# Patient Record
Sex: MALE | Race: WHITE | NOT HISPANIC OR LATINO | Employment: OTHER | URBAN - METROPOLITAN AREA
[De-identification: names, ages, dates, MRNs, and addresses within clinical notes are randomized per-mention and may not be internally consistent; named-entity substitution may affect disease eponyms.]

---

## 2018-11-21 ENCOUNTER — TELEPHONE (OUTPATIENT)
Dept: GASTROENTEROLOGY | Facility: CLINIC | Age: 73
End: 2018-11-21

## 2018-12-13 ENCOUNTER — OFFICE VISIT (OUTPATIENT)
Dept: GASTROENTEROLOGY | Facility: CLINIC | Age: 73
End: 2018-12-13

## 2018-12-13 VITALS
HEART RATE: 66 BPM | DIASTOLIC BLOOD PRESSURE: 98 MMHG | WEIGHT: 247 LBS | SYSTOLIC BLOOD PRESSURE: 170 MMHG | HEIGHT: 68 IN | BODY MASS INDEX: 37.44 KG/M2

## 2018-12-13 DIAGNOSIS — R13.19 ESOPHAGEAL DYSPHAGIA: Primary | ICD-10-CM

## 2018-12-13 DIAGNOSIS — Z85.038 HISTORY OF COLON CANCER: ICD-10-CM

## 2018-12-13 DIAGNOSIS — R12 HEARTBURN: ICD-10-CM

## 2018-12-13 PROCEDURE — 99204 OFFICE O/P NEW MOD 45 MIN: CPT | Performed by: INTERNAL MEDICINE

## 2018-12-13 RX ORDER — ALFUZOSIN HYDROCHLORIDE 10 MG/1
10 TABLET, EXTENDED RELEASE ORAL 2 TIMES DAILY
Status: ON HOLD | COMMUNITY
Start: 2018-09-18 | End: 2022-11-21

## 2018-12-13 RX ORDER — SITAGLIPTIN 100 MG/1
100 TABLET, FILM COATED ORAL DAILY
Status: ON HOLD | COMMUNITY
Start: 2018-12-12

## 2018-12-13 RX ORDER — SODIUM CHLORIDE, SODIUM LACTATE, POTASSIUM CHLORIDE, CALCIUM CHLORIDE 600; 310; 30; 20 MG/100ML; MG/100ML; MG/100ML; MG/100ML
30 INJECTION, SOLUTION INTRAVENOUS CONTINUOUS
Status: CANCELLED | OUTPATIENT
Start: 2018-12-18

## 2018-12-13 RX ORDER — NAPROXEN SODIUM 220 MG
220 TABLET ORAL 2 TIMES DAILY PRN
COMMUNITY
End: 2019-08-10 | Stop reason: HOSPADM

## 2018-12-13 RX ORDER — ATORVASTATIN CALCIUM 10 MG/1
10 TABLET, FILM COATED ORAL DAILY
COMMUNITY
Start: 2018-12-12 | End: 2019-07-25

## 2018-12-13 RX ORDER — ERGOCALCIFEROL (VITAMIN D2) 50 MCG
1 CAPSULE ORAL DAILY
Status: ON HOLD | COMMUNITY

## 2018-12-13 NOTE — PROGRESS NOTES
Subjective   Luis Diaz is a 73 y.o.. male is being seen for consultation today at the request of No ref. provider found    Chief Complaint   Patient presents with   • GI Problem     Rectal Drainage/Has Colostomy   • Difficulty Swallowing   • Heartburn     Reflux     History of Present Illness  Patient over the last several months has had intermittent solid food dysphagia.  This is been associated with more chronic heartburn.  He has not had any hematemesis or involuntary weight loss.  He describes the symptoms as mild-to-moderate.  He is undergone a distal colon resection for colon cancer and has drainage from his rectum and is concerned about that as well.  The following portions of the patient's history were reviewed and updated as appropriate: allergies, current medications, past family history, past medical history, past social history, past surgical history and problem list.      Current Outpatient Medications:   •  alfuzosin (UROXATRAL) 10 MG 24 hr tablet, , Disp: , Rfl:   •  atorvastatin (LIPITOR) 10 MG tablet, , Disp: , Rfl:   •  CINNAMON PO, Take  by mouth., Disp: , Rfl:   •  Coenzyme Q10 (COQ-10) 200 MG capsule, Take  by mouth., Disp: , Rfl:   •  ibuprofen (ADVIL,MOTRIN) 200 MG tablet, Take 200 mg by mouth every 6 (six) hours as needed for mild pain (1-3)., Disp: , Rfl:   •  JANUVIA 100 MG tablet, , Disp: , Rfl:   •  lisinopril (PRINIVIL,ZESTRIL) 10 MG tablet, Take 10 mg by mouth daily., Disp: , Rfl:   •  Multiple Vitamins-Minerals (CENTRUM SILVER 50+MEN PO), Take  by mouth., Disp: , Rfl:   •  naproxen sodium (ALEVE) 220 MG tablet, Take 220 mg by mouth 2 (Two) Times a Day As Needed., Disp: , Rfl:   •  Vitamin D, Ergocalciferol, 2000 units capsule, Take  by mouth., Disp: , Rfl:     Family History   Problem Relation Age of Onset   • Emphysema Mother    • Lung cancer Father        Review of Systems   Constitutional: Negative for appetite change, diaphoresis, fatigue, fever and unexpected weight change.    HENT: Positive for trouble swallowing. Negative for hearing loss, mouth sores and sore throat.    Eyes: Negative for pain and redness.   Respiratory: Negative for choking and shortness of breath.    Cardiovascular: Negative for chest pain and leg swelling.   Gastrointestinal: Negative for abdominal distention, abdominal pain, anal bleeding, blood in stool, constipation, diarrhea, nausea, rectal pain and vomiting.   Genitourinary: Negative for flank pain and hematuria.   Musculoskeletal: Negative for arthralgias and joint swelling.   Skin: Negative for color change and rash.   Allergic/Immunologic: Negative for food allergies and immunocompromised state.   Neurological: Negative for dizziness, seizures and headaches.   Hematological: Does not bruise/bleed easily.   Psychiatric/Behavioral: Negative for confusion, sleep disturbance and suicidal ideas. The patient is not nervous/anxious.        Objective   Physical Exam   Constitutional: He is oriented to person, place, and time. He appears well-developed and well-nourished.   HENT:   Head: Normocephalic and atraumatic.   Nose: Nose normal.   Eyes: Conjunctivae are normal. Pupils are equal, round, and reactive to light.   Neck: Normal range of motion. Neck supple. No thyromegaly present.   Cardiovascular: Normal heart sounds. Exam reveals no gallop and no friction rub.   No murmur heard.  Pulmonary/Chest: Effort normal and breath sounds normal.   Abdominal: Soft. Bowel sounds are normal. He exhibits no distension and no mass. There is no tenderness.   Musculoskeletal: He exhibits no edema.   Lymphadenopathy:     He has no cervical adenopathy.   Neurological: He is alert and oriented to person, place, and time.   Skin: No rash noted. No erythema.   Psychiatric: He has a normal mood and affect. His behavior is normal.   Nursing note and vitals reviewed.      Pertinent laboratory results were reviewed.  and Pertinent old records were reviewed.     Assessment/Plan    Problems Addressed this Visit        Digestive    Esophageal dysphagia - Primary    Relevant Orders    Case Request (Completed)    Heartburn    Relevant Orders    Case Request (Completed)       Other    History of colon cancer    Relevant Orders    Case Request (Completed)        EGD is scheduled to address his dysphagia.  While we are there I will take a look at his rectal stump.  I anticipate diversion proctitis.

## 2018-12-14 PROBLEM — Z85.038 HISTORY OF COLON CANCER: Status: ACTIVE | Noted: 2018-12-14

## 2018-12-18 ENCOUNTER — ANESTHESIA (OUTPATIENT)
Dept: GASTROENTEROLOGY | Facility: HOSPITAL | Age: 73
End: 2018-12-18

## 2018-12-18 ENCOUNTER — HOSPITAL ENCOUNTER (OUTPATIENT)
Facility: HOSPITAL | Age: 73
Setting detail: HOSPITAL OUTPATIENT SURGERY
Discharge: HOME | End: 2018-12-18
Attending: INTERNAL MEDICINE | Admitting: INTERNAL MEDICINE

## 2018-12-18 ENCOUNTER — ANESTHESIA EVENT (OUTPATIENT)
Dept: GASTROENTEROLOGY | Facility: HOSPITAL | Age: 73
End: 2018-12-18

## 2018-12-18 VITALS
BODY MASS INDEX: 37.29 KG/M2 | WEIGHT: 245.25 LBS | SYSTOLIC BLOOD PRESSURE: 129 MMHG | RESPIRATION RATE: 17 BRPM | OXYGEN SATURATION: 96 % | DIASTOLIC BLOOD PRESSURE: 89 MMHG | HEART RATE: 63 BPM | TEMPERATURE: 98.5 F

## 2018-12-18 DIAGNOSIS — R13.19 ESOPHAGEAL DYSPHAGIA: ICD-10-CM

## 2018-12-18 DIAGNOSIS — R12 HEARTBURN: ICD-10-CM

## 2018-12-18 DIAGNOSIS — Z85.038 HISTORY OF COLON CANCER: ICD-10-CM

## 2018-12-18 LAB — GLUCOSE BLDC GLUCOMTR-MCNC: 138 MG/DL (ref 70–130)

## 2018-12-18 PROCEDURE — 82962 GLUCOSE BLOOD TEST: CPT

## 2018-12-18 PROCEDURE — 43235 EGD DIAGNOSTIC BRUSH WASH: CPT | Performed by: INTERNAL MEDICINE

## 2018-12-18 PROCEDURE — 43450 DILATE ESOPHAGUS 1/MULT PASS: CPT | Performed by: INTERNAL MEDICINE

## 2018-12-18 PROCEDURE — 25010000002 PROPOFOL 10 MG/ML EMULSION: Performed by: ANESTHESIOLOGY

## 2018-12-18 RX ORDER — PROPOFOL 10 MG/ML
VIAL (ML) INTRAVENOUS CONTINUOUS PRN
Status: DISCONTINUED | OUTPATIENT
Start: 2018-12-18 | End: 2018-12-18 | Stop reason: SURG

## 2018-12-18 RX ORDER — PROPOFOL 10 MG/ML
VIAL (ML) INTRAVENOUS AS NEEDED
Status: DISCONTINUED | OUTPATIENT
Start: 2018-12-18 | End: 2018-12-18 | Stop reason: SURG

## 2018-12-18 RX ORDER — SODIUM CHLORIDE, SODIUM LACTATE, POTASSIUM CHLORIDE, CALCIUM CHLORIDE 600; 310; 30; 20 MG/100ML; MG/100ML; MG/100ML; MG/100ML
30 INJECTION, SOLUTION INTRAVENOUS CONTINUOUS
Status: DISCONTINUED | OUTPATIENT
Start: 2018-12-18 | End: 2018-12-18 | Stop reason: HOSPADM

## 2018-12-18 RX ORDER — LIDOCAINE HYDROCHLORIDE 20 MG/ML
INJECTION, SOLUTION INFILTRATION; PERINEURAL AS NEEDED
Status: DISCONTINUED | OUTPATIENT
Start: 2018-12-18 | End: 2018-12-18 | Stop reason: SURG

## 2018-12-18 RX ADMIN — PROPOFOL 140 MG: 10 INJECTION, EMULSION INTRAVENOUS at 10:14

## 2018-12-18 RX ADMIN — PROPOFOL 140 MCG/KG/MIN: 10 INJECTION, EMULSION INTRAVENOUS at 10:14

## 2018-12-18 RX ADMIN — LIDOCAINE HYDROCHLORIDE 40 MG: 20 INJECTION, SOLUTION INFILTRATION; PERINEURAL at 10:14

## 2018-12-18 RX ADMIN — SODIUM CHLORIDE, POTASSIUM CHLORIDE, SODIUM LACTATE AND CALCIUM CHLORIDE: 600; 310; 30; 20 INJECTION, SOLUTION INTRAVENOUS at 10:08

## 2018-12-18 RX ADMIN — SODIUM CHLORIDE, POTASSIUM CHLORIDE, SODIUM LACTATE AND CALCIUM CHLORIDE 30 ML/HR: 600; 310; 30; 20 INJECTION, SOLUTION INTRAVENOUS at 09:28

## 2018-12-18 NOTE — ANESTHESIA PREPROCEDURE EVALUATION
Anesthesia Evaluation     NPO Solid Status: > 8 hours  NPO Liquid Status: > 8 hours           Airway   Mallampati: III  Dental      Pulmonary    (+) sleep apnea on CPAP,   Cardiovascular - normal exam    (+) hypertension,       Neuro/Psych  GI/Hepatic/Renal/Endo    (+)   diabetes mellitus type 2 well controlled,   (-) PUD    Musculoskeletal     Abdominal    Substance History      OB/GYN          Other                      Anesthesia Plan    ASA 3     MAC     intravenous induction   Anesthetic plan, all risks, benefits, and alternatives have been provided, discussed and informed consent has been obtained with: patient.

## 2018-12-18 NOTE — ANESTHESIA POSTPROCEDURE EVALUATION
Patient: Luis Diaz    Procedure Summary     Date:  12/18/18 Room / Location:  [facility] ENDOSCOPY 1 /  [facility]    Anesthesia Start:  1008 Anesthesia Stop:  1033    Procedures:       ESOPHAGOGASTRODUODENOSCOPY WITH 48FR AND 52FR LITTLEJOHN DILITTATION (N/A Esophagus)      FLEXIBLE SIGMOIDOSCOPY (N/A ) Diagnosis:       Esophageal dysphagia      Heartburn      History of colon cancer      (Esophageal dysphagia [R13.10])      (Heartburn [R12])      (History of colon cancer [Z85.038])    Surgeon:  Morgan Dhaliwal MD Provider:  Jim Benjamin MD    Anesthesia Type:  MAC ASA Status:  3          Anesthesia Type: MAC  Last vitals  BP   122/70 (12/18/18 1042)   Temp   36.9 °C (98.5 °F) (12/18/18 0916)   Pulse   70 (12/18/18 1042)   Resp   16 (12/18/18 1042)     SpO2   96 % (12/18/18 1042)     Post Anesthesia Care and Evaluation    Patient location during evaluation: bedside  Patient participation: complete - patient participated  Level of consciousness: awake and alert  Pain management: adequate  Anesthetic complications: No anesthetic complications    Cardiovascular status: acceptable  Respiratory status: acceptable  Hydration status: acceptable    Comments: /70 (BP Location: Left arm, Patient Position: Sitting)   Pulse 70   Temp 36.9 °C (98.5 °F) (Oral)   Resp 16   Wt 111 kg (245 lb 4 oz)   SpO2 96%   BMI 37.29 kg/m²

## 2019-01-04 DIAGNOSIS — Z85.048 HISTORY OF RECTAL CANCER: Primary | ICD-10-CM

## 2019-01-07 ENCOUNTER — TELEPHONE (OUTPATIENT)
Dept: GASTROENTEROLOGY | Facility: CLINIC | Age: 74
End: 2019-01-07

## 2019-01-07 NOTE — TELEPHONE ENCOUNTER
Let patient know his CT scheduled for 1-9-19 at [facility].he is to arrive at 10.15am. He knows where to go. He will only have liquids that morning until after CT is done.

## 2019-01-09 ENCOUNTER — HOSPITAL ENCOUNTER (OUTPATIENT)
Dept: CT IMAGING | Facility: HOSPITAL | Age: 74
Discharge: HOME | End: 2019-01-09
Attending: INTERNAL MEDICINE | Admitting: INTERNAL MEDICINE

## 2019-01-09 DIAGNOSIS — Z85.048 HISTORY OF RECTAL CANCER: ICD-10-CM

## 2019-01-09 LAB — CREAT BLDA-MCNC: 1 MG/DL (ref 0.6–1.3)

## 2019-01-09 PROCEDURE — 25010000002 IOPAMIDOL 61 % SOLUTION: Performed by: INTERNAL MEDICINE

## 2019-01-09 PROCEDURE — 74177 CT ABD & PELVIS W/CONTRAST: CPT

## 2019-01-09 PROCEDURE — 82565 ASSAY OF CREATININE: CPT

## 2019-01-09 RX ADMIN — IOPAMIDOL 85 ML: 612 INJECTION, SOLUTION INTRAVENOUS at 10:47

## 2019-07-25 ENCOUNTER — APPOINTMENT (OUTPATIENT)
Dept: PREADMISSION TESTING | Facility: HOSPITAL | Age: 74
End: 2019-07-25

## 2019-07-25 ENCOUNTER — HOSPITAL ENCOUNTER (OUTPATIENT)
Dept: GENERAL RADIOLOGY | Facility: HOSPITAL | Age: 74
Discharge: HOME | End: 2019-07-25

## 2019-07-25 ENCOUNTER — HOSPITAL ENCOUNTER (OUTPATIENT)
Dept: GENERAL RADIOLOGY | Facility: HOSPITAL | Age: 74
Discharge: HOME | End: 2019-07-25
Admitting: ORTHOPAEDIC SURGERY

## 2019-07-25 VITALS
SYSTOLIC BLOOD PRESSURE: 151 MMHG | WEIGHT: 236.5 LBS | BODY MASS INDEX: 35.84 KG/M2 | RESPIRATION RATE: 16 BRPM | OXYGEN SATURATION: 98 % | TEMPERATURE: 97 F | HEART RATE: 64 BPM | DIASTOLIC BLOOD PRESSURE: 82 MMHG | HEIGHT: 68 IN

## 2019-07-25 DIAGNOSIS — M17.9 OSTEOARTHRITIS OF KNEE, UNSPECIFIED LATERALITY, UNSPECIFIED OSTEOARTHRITIS TYPE: ICD-10-CM

## 2019-07-25 LAB
BACTERIA UR QL AUTO: NORMAL /HPF
BILIRUB UR QL STRIP: NEGATIVE
CLARITY UR: CLEAR
COLOR UR: YELLOW
GLUCOSE UR STRIP-MCNC: NEGATIVE MG/DL
HGB UR QL STRIP.AUTO: NEGATIVE
HYALINE CASTS UR QL AUTO: NORMAL /LPF
INR PPP: 1 (ref 0.9–1.1)
KETONES UR QL STRIP: NEGATIVE
LEUKOCYTE ESTERASE UR QL STRIP.AUTO: NEGATIVE
NITRITE UR QL STRIP: NEGATIVE
PH UR STRIP.AUTO: 5.5 [PH] (ref 5–8)
PROT UR QL STRIP: NEGATIVE
PROTHROMBIN TIME: 12.9 SECONDS (ref 11.7–14.2)
RBC # UR: NORMAL /HPF
REF LAB TEST METHOD: NORMAL
SP GR UR STRIP: 1.02 (ref 1–1.03)
SQUAMOUS #/AREA URNS HPF: NORMAL /HPF
UROBILINOGEN UR QL STRIP: NORMAL
WBC UR QL AUTO: NORMAL /HPF

## 2019-07-25 PROCEDURE — 81001 URINALYSIS AUTO W/SCOPE: CPT | Performed by: ORTHOPAEDIC SURGERY

## 2019-07-25 PROCEDURE — 85610 PROTHROMBIN TIME: CPT | Performed by: ORTHOPAEDIC SURGERY

## 2019-07-25 PROCEDURE — 36415 COLL VENOUS BLD VENIPUNCTURE: CPT

## 2019-07-25 PROCEDURE — 93005 ELECTROCARDIOGRAM TRACING: CPT

## 2019-07-25 PROCEDURE — 93010 ELECTROCARDIOGRAM REPORT: CPT | Performed by: INTERNAL MEDICINE

## 2019-07-25 PROCEDURE — 71046 X-RAY EXAM CHEST 2 VIEWS: CPT

## 2019-07-25 PROCEDURE — 73560 X-RAY EXAM OF KNEE 1 OR 2: CPT

## 2019-07-25 ASSESSMENT — KOOS JR
KOOS JR SCORE: 24
KOOS JR SCORE: 24.875

## 2019-07-25 NOTE — DISCHARGE INSTRUCTIONS
Take the following medications the morning of surgery with a small sip of water:  BRING LISINOPRIL WITH YOU MORNING OF SURGERY      General Instructions: CLEAR LIQUIDS UNTIL 6:30 AM MORNING OF SURGERY  • Do not eat solid food after midnight the night before surgery.  • You may drink clear liquids day of surgery but must stop at least one hour before your hospital arrival time.  • It is beneficial for you to have a clear drink that contains carbohydrates the day of surgery.  We suggest a 12 to 20 ounce bottle of Gatorade or Powerade for non-diabetic patients or a 12 to 20 ounce bottle of G2 or Powerade Zero for diabetic patients. (Pediatric patients, are not advised to drink a 12 to 20 ounce carbohydrate drink)    Clear liquids are liquids you can see through.  Nothing red in color.     Plain water                               Sports drinks  Sodas                                   Gelatin (Jell-O)  Fruit juices without pulp such as white grape juice and apple juice  Popsicles that contain no fruit or yogurt  Tea or coffee (no cream or milk added)  Gatorade / Powerade  G2 / Powerade Zero    • Infants may have breast milk up to four hours before surgery.  • Infants drinking formula may drink formula up to six hours before surgery.   • Patients who avoid smoking, chewing tobacco and alcohol for 4 weeks prior to surgery have a reduced risk of post-operative complications.  Quit smoking as many days before surgery as you can.  • Do not smoke, use chewing tobacco or drink alcohol the day of surgery.   • If applicable bring your C-PAP/ BI-PAP machine.  • Bring any papers given to you in the doctor’s office.  • Wear clean comfortable clothes and socks.  • Do not wear contact lenses, false eyelashes or make-up.  Bring a case for your glasses.   • Bring crutches or walker if applicable.  • Remove all piercings.  Leave jewelry and any other valuables at home.  • Hair extensions with metal clips must be removed prior to  surgery.  • The Pre-Admission Testing nurse will instruct you to bring medications if unable to obtain an accurate list in Pre-Admission Testing.        If you were given a blood bank ID arm band remember to bring it with you the day of surgery.    Preventing a Surgical Site Infection:  • For 2 to 3 days before surgery, avoid shaving with a razor because the razor can irritate skin and make it easier to develop an infection.    • Any areas of open skin can increase the risk of a post-operative wound infection by allowing bacteria to enter and travel throughout the body.  Notify your surgeon if you have any skin wounds / rashes even if it is not near the expected surgical site.  The area will need assessed to determine if surgery should be delayed until it is healed.  • The night prior to surgery sleep in a clean bed with clean clothing.  Do not allow pets to sleep with you.  • Shower on the morning of surgery using a fresh bar of anti-bacterial soap (such as Dial) and clean washcloth.  Dry with a clean towel and dress in clean clothing.  • Ask your surgeon if you will be receiving antibiotics prior to surgery.  • Make sure you, your family, and all healthcare providers clean their hands with soap and water or an alcohol based hand  before caring for you or your wound.    Day of surgery: 8/8/2019 ARRIVAL TIME 7:30 AM  Upon arrival, a Pre-op nurse and Anesthesiologist will review your health history, obtain vital signs, and answer questions you may have.  The only belongings needed at this time will be a list of your home medications and if applicable your C-PAP/BI-PAP machine.  If you are staying overnight your family can leave the rest of your belongings in the car and bring them to your room later.  A Pre-op nurse will start an IV and you may receive medication in preparation for surgery, including something to help you relax.  Your family will be able to see you in the Pre-op area.  While you are in  surgery your family should notify the waiting room  if they leave the waiting room area and provide a contact phone number.    Please be aware that surgery does come with discomfort.  We want to make every effort to control your discomfort so please discuss any uncontrolled symptoms with your nurse.   Your doctor will most likely have prescribed pain medications.      If you are going home after surgery you will receive individualized written care instructions before being discharged.  A responsible adult must drive you to and from the hospital on the day of your surgery and stay with you for 24 hours.    If you are staying overnight following surgery, you will be transported to your hospital room following the recovery period.  [facility] has all private rooms.    You have received a list of surgical assistants for your reference.  If you have any questions please call Pre-Admission Testing at [phone].  Deductibles and co-payments are collected on the day of service. Please be prepared to pay the required co-pay, deductible or deposit on the day of service as defined by your plan.    2% CHLORAHEXIDINE GLUCONATE* CLOTH  Preparing or “prepping” skin before surgery can reduce the risk of infection at the surgical site. To make the process easier, [facility] has chosen disposable cloths moistened with a rinse-free, 2% Chlorhexidine Gluconate (CHG) antiseptic solution. The steps below outline the prepping process and should be carefully followed.        Use the prep cloth on the area that is circled in the diagram             Directions Night before Surgery  1) Shower using a fresh bar of anti-bacterial soap (such as Dial) and clean washcloth.  Use a clean towel to completely dry your skin.  2) Do not use any lotions, oils or creams on your skin.  3) Open the package and remove 1 cloth, wipe your skin for 30 seconds in a circular motion.  Allow to dry for 3 minutes.  4) Repeat  #3 with second cloth.  5) Do not touch your eyes, ears, or mouth with the prep cloth.  6) Allow the wet prep solution to air dry.  7) Discard the prep cloth and wash your hands with soap and water.   8) Dress in clean bed clothes and sleep on fresh clean bed sheets.   9) You may experience some temporary itching after the prep.    Directions Day of Surgery  1) Repeat steps 1,2,3,4,5,6,7, and 9.   2) Dress in clean clothes before coming to the hospital.    BACTROBAN NASAL OINTMENT  There are many germs normally in your nose. Bactroban is an ointment that will help reduce these germs. Please follow these instructions for Bactroban use:      ____The day before surgery in the morning  Date________    ____The day before surgery in the evening              Date________    ____The day of surgery in the morning    Date________    **Squirt ½ package of Bactroban Ointment onto a cotton applicator and apply to inside of 1st nostril.  Squirt the remaining Bactroban and apply to the inside of the other nostril.

## 2019-08-08 ENCOUNTER — HOSPITAL ENCOUNTER (INPATIENT)
Facility: HOSPITAL | Age: 74
LOS: 2 days | Discharge: HOME HEALTH | End: 2019-08-10
Attending: ORTHOPAEDIC SURGERY | Admitting: ORTHOPAEDIC SURGERY

## 2019-08-08 ENCOUNTER — ANESTHESIA (OUTPATIENT)
Dept: PERIOP | Facility: HOSPITAL | Age: 74
End: 2019-08-08

## 2019-08-08 ENCOUNTER — ANESTHESIA EVENT (OUTPATIENT)
Dept: PERIOP | Facility: HOSPITAL | Age: 74
End: 2019-08-08

## 2019-08-08 ENCOUNTER — APPOINTMENT (OUTPATIENT)
Dept: GENERAL RADIOLOGY | Facility: HOSPITAL | Age: 74
End: 2019-08-08

## 2019-08-08 PROBLEM — M17.9 OA (OSTEOARTHRITIS) OF KNEE: Status: ACTIVE | Noted: 2019-08-08

## 2019-08-08 LAB
GLUCOSE BLDC GLUCOMTR-MCNC: 157 MG/DL (ref 70–130)
GLUCOSE BLDC GLUCOMTR-MCNC: 174 MG/DL (ref 70–130)
GLUCOSE BLDC GLUCOMTR-MCNC: 175 MG/DL (ref 70–130)

## 2019-08-08 PROCEDURE — 25010000002 PROPOFOL 10 MG/ML EMULSION: Performed by: NURSE ANESTHETIST, CERTIFIED REGISTERED

## 2019-08-08 PROCEDURE — 25010000002 MORPHINE (PF) 10 MG/ML SOLUTION 1 ML VIAL: Performed by: ORTHOPAEDIC SURGERY

## 2019-08-08 PROCEDURE — 25010000002 NEOSTIGMINE PER 0.5 MG: Performed by: NURSE ANESTHETIST, CERTIFIED REGISTERED

## 2019-08-08 PROCEDURE — 25010000002 HYDROMORPHONE PER 4 MG: Performed by: NURSE ANESTHETIST, CERTIFIED REGISTERED

## 2019-08-08 PROCEDURE — 97110 THERAPEUTIC EXERCISES: CPT

## 2019-08-08 PROCEDURE — 82962 GLUCOSE BLOOD TEST: CPT

## 2019-08-08 PROCEDURE — 25010000002 ROPIVACAINE PER 1 MG: Performed by: STUDENT IN AN ORGANIZED HEALTH CARE EDUCATION/TRAINING PROGRAM

## 2019-08-08 PROCEDURE — 25010000002 ONDANSETRON PER 1 MG: Performed by: NURSE ANESTHETIST, CERTIFIED REGISTERED

## 2019-08-08 PROCEDURE — 73560 X-RAY EXAM OF KNEE 1 OR 2: CPT

## 2019-08-08 PROCEDURE — 25010000002 FENTANYL CITRATE (PF) 100 MCG/2ML SOLUTION: Performed by: NURSE ANESTHETIST, CERTIFIED REGISTERED

## 2019-08-08 PROCEDURE — C1776 JOINT DEVICE (IMPLANTABLE): HCPCS | Performed by: ORTHOPAEDIC SURGERY

## 2019-08-08 PROCEDURE — 25010000002 MIDAZOLAM PER 1 MG: Performed by: ANESTHESIOLOGY

## 2019-08-08 PROCEDURE — C1713 ANCHOR/SCREW BN/BN,TIS/BN: HCPCS | Performed by: ORTHOPAEDIC SURGERY

## 2019-08-08 PROCEDURE — 25010000003 CEFAZOLIN IN DEXTROSE 2-4 GM/100ML-% SOLUTION: Performed by: ORTHOPAEDIC SURGERY

## 2019-08-08 PROCEDURE — 97162 PT EVAL MOD COMPLEX 30 MIN: CPT

## 2019-08-08 PROCEDURE — 0SRC0J9 REPLACEMENT OF RIGHT KNEE JOINT WITH SYNTHETIC SUBSTITUTE, CEMENTED, OPEN APPROACH: ICD-10-PCS | Performed by: ORTHOPAEDIC SURGERY

## 2019-08-08 PROCEDURE — 25010000002 ROPIVACAINE PER 1 MG: Performed by: ORTHOPAEDIC SURGERY

## 2019-08-08 DEVICE — CAP TOTL KN CMT PREMIUM: Type: IMPLANTABLE DEVICE | Site: KNEE | Status: FUNCTIONAL

## 2019-08-08 DEVICE — CMT BONE R 1X40: Type: IMPLANTABLE DEVICE | Site: KNEE | Status: FUNCTIONAL

## 2019-08-08 DEVICE — IMPLANTABLE DEVICE
Type: IMPLANTABLE DEVICE | Site: KNEE | Status: FUNCTIONAL
Brand: VANGUARD® KNEE SYSTEM

## 2019-08-08 DEVICE — IMPLANTABLE DEVICE
Type: IMPLANTABLE DEVICE | Site: KNEE | Status: FUNCTIONAL
Brand: BIOMET KNEE SYSTEM

## 2019-08-08 DEVICE — IMPLANTABLE DEVICE
Type: IMPLANTABLE DEVICE | Site: KNEE | Status: FUNCTIONAL
Brand: BIOMET® KNEE SYSTEM

## 2019-08-08 DEVICE — IMPLANTABLE DEVICE
Type: IMPLANTABLE DEVICE | Site: KNEE | Status: FUNCTIONAL
Brand: VANGUARD KNEE SYSTEM

## 2019-08-08 RX ORDER — CHLORHEXIDINE GLUCONATE 500 MG/1
1 CLOTH TOPICAL TAKE AS DIRECTED
COMMUNITY
End: 2019-08-10 | Stop reason: HOSPADM

## 2019-08-08 RX ORDER — ROPIVACAINE HYDROCHLORIDE 5 MG/ML
INJECTION, SOLUTION EPIDURAL; INFILTRATION; PERINEURAL
Status: COMPLETED | OUTPATIENT
Start: 2019-08-08 | End: 2019-08-08

## 2019-08-08 RX ORDER — OXYCODONE AND ACETAMINOPHEN 7.5; 325 MG/1; MG/1
1 TABLET ORAL ONCE AS NEEDED
Status: DISCONTINUED | OUTPATIENT
Start: 2019-08-08 | End: 2019-08-08 | Stop reason: HOSPADM

## 2019-08-08 RX ORDER — ACETAMINOPHEN 500 MG
1000 TABLET ORAL
Status: COMPLETED | OUTPATIENT
Start: 2019-08-08 | End: 2019-08-08

## 2019-08-08 RX ORDER — CEFAZOLIN SODIUM 2 G/100ML
2 INJECTION, SOLUTION INTRAVENOUS
Status: COMPLETED | OUTPATIENT
Start: 2019-08-08 | End: 2019-08-08

## 2019-08-08 RX ORDER — NALOXONE HCL 0.4 MG/ML
0.2 VIAL (ML) INJECTION AS NEEDED
Status: DISCONTINUED | OUTPATIENT
Start: 2019-08-08 | End: 2019-08-08 | Stop reason: HOSPADM

## 2019-08-08 RX ORDER — SODIUM CHLORIDE 0.9 % (FLUSH) 0.9 %
3 SYRINGE (ML) INJECTION EVERY 12 HOURS SCHEDULED
Status: DISCONTINUED | OUTPATIENT
Start: 2019-08-08 | End: 2019-08-08 | Stop reason: HOSPADM

## 2019-08-08 RX ORDER — MIDAZOLAM HYDROCHLORIDE 1 MG/ML
1 INJECTION INTRAMUSCULAR; INTRAVENOUS
Status: DISCONTINUED | OUTPATIENT
Start: 2019-08-08 | End: 2019-08-08 | Stop reason: HOSPADM

## 2019-08-08 RX ORDER — CHOLECALCIFEROL (VITAMIN D3) 125 MCG
5 CAPSULE ORAL NIGHTLY PRN
Status: DISCONTINUED | OUTPATIENT
Start: 2019-08-08 | End: 2019-08-10 | Stop reason: HOSPADM

## 2019-08-08 RX ORDER — FENTANYL CITRATE 50 UG/ML
50 INJECTION, SOLUTION INTRAMUSCULAR; INTRAVENOUS
Status: DISCONTINUED | OUTPATIENT
Start: 2019-08-08 | End: 2019-08-08 | Stop reason: HOSPADM

## 2019-08-08 RX ORDER — LABETALOL HYDROCHLORIDE 5 MG/ML
5 INJECTION, SOLUTION INTRAVENOUS
Status: DISCONTINUED | OUTPATIENT
Start: 2019-08-08 | End: 2019-08-08 | Stop reason: HOSPADM

## 2019-08-08 RX ORDER — FLUMAZENIL 0.1 MG/ML
0.2 INJECTION INTRAVENOUS AS NEEDED
Status: DISCONTINUED | OUTPATIENT
Start: 2019-08-08 | End: 2019-08-08 | Stop reason: HOSPADM

## 2019-08-08 RX ORDER — BISACODYL 10 MG
10 SUPPOSITORY, RECTAL RECTAL DAILY PRN
Status: DISCONTINUED | OUTPATIENT
Start: 2019-08-08 | End: 2019-08-10 | Stop reason: HOSPADM

## 2019-08-08 RX ORDER — FENTANYL CITRATE 50 UG/ML
INJECTION, SOLUTION INTRAMUSCULAR; INTRAVENOUS AS NEEDED
Status: DISCONTINUED | OUTPATIENT
Start: 2019-08-08 | End: 2019-08-08 | Stop reason: SURG

## 2019-08-08 RX ORDER — HYDROCODONE BITARTRATE AND ACETAMINOPHEN 7.5; 325 MG/1; MG/1
1 TABLET ORAL ONCE AS NEEDED
Status: DISCONTINUED | OUTPATIENT
Start: 2019-08-08 | End: 2019-08-08 | Stop reason: HOSPADM

## 2019-08-08 RX ORDER — PROMETHAZINE HYDROCHLORIDE 25 MG/1
25 SUPPOSITORY RECTAL ONCE AS NEEDED
Status: DISCONTINUED | OUTPATIENT
Start: 2019-08-08 | End: 2019-08-08 | Stop reason: HOSPADM

## 2019-08-08 RX ORDER — ATORVASTATIN CALCIUM 10 MG/1
10 TABLET, FILM COATED ORAL NIGHTLY
Status: DISCONTINUED | OUTPATIENT
Start: 2019-08-08 | End: 2019-08-10 | Stop reason: HOSPADM

## 2019-08-08 RX ORDER — SODIUM CHLORIDE 0.9 % (FLUSH) 0.9 %
3-10 SYRINGE (ML) INJECTION AS NEEDED
Status: DISCONTINUED | OUTPATIENT
Start: 2019-08-08 | End: 2019-08-08 | Stop reason: HOSPADM

## 2019-08-08 RX ORDER — DOCUSATE SODIUM 100 MG/1
100 CAPSULE, LIQUID FILLED ORAL 2 TIMES DAILY PRN
Status: DISCONTINUED | OUTPATIENT
Start: 2019-08-08 | End: 2019-08-10 | Stop reason: HOSPADM

## 2019-08-08 RX ORDER — SENNA AND DOCUSATE SODIUM 50; 8.6 MG/1; MG/1
2 TABLET, FILM COATED ORAL 2 TIMES DAILY PRN
Status: DISCONTINUED | OUTPATIENT
Start: 2019-08-08 | End: 2019-08-10 | Stop reason: HOSPADM

## 2019-08-08 RX ORDER — HYDROMORPHONE HYDROCHLORIDE 1 MG/ML
0.5 INJECTION, SOLUTION INTRAMUSCULAR; INTRAVENOUS; SUBCUTANEOUS
Status: DISCONTINUED | OUTPATIENT
Start: 2019-08-08 | End: 2019-08-08 | Stop reason: HOSPADM

## 2019-08-08 RX ORDER — ACETAMINOPHEN 325 MG/1
325 TABLET ORAL EVERY 4 HOURS PRN
Status: DISCONTINUED | OUTPATIENT
Start: 2019-08-08 | End: 2019-08-10 | Stop reason: HOSPADM

## 2019-08-08 RX ORDER — ROCURONIUM BROMIDE 10 MG/ML
INJECTION, SOLUTION INTRAVENOUS AS NEEDED
Status: DISCONTINUED | OUTPATIENT
Start: 2019-08-08 | End: 2019-08-08 | Stop reason: SURG

## 2019-08-08 RX ORDER — TRANEXAMIC ACID 100 MG/ML
INJECTION, SOLUTION INTRAVENOUS AS NEEDED
Status: DISCONTINUED | OUTPATIENT
Start: 2019-08-08 | End: 2019-08-08 | Stop reason: SURG

## 2019-08-08 RX ORDER — LISINOPRIL 10 MG/1
10 TABLET ORAL DAILY
Status: DISCONTINUED | OUTPATIENT
Start: 2019-08-08 | End: 2019-08-10 | Stop reason: HOSPADM

## 2019-08-08 RX ORDER — HYDROMORPHONE HCL 110MG/55ML
PATIENT CONTROLLED ANALGESIA SYRINGE INTRAVENOUS AS NEEDED
Status: DISCONTINUED | OUTPATIENT
Start: 2019-08-08 | End: 2019-08-08 | Stop reason: SURG

## 2019-08-08 RX ORDER — LIDOCAINE HYDROCHLORIDE 20 MG/ML
INJECTION, SOLUTION INFILTRATION; PERINEURAL AS NEEDED
Status: DISCONTINUED | OUTPATIENT
Start: 2019-08-08 | End: 2019-08-08 | Stop reason: SURG

## 2019-08-08 RX ORDER — ATORVASTATIN CALCIUM 10 MG/1
10 TABLET, FILM COATED ORAL NIGHTLY
Status: ON HOLD | COMMUNITY

## 2019-08-08 RX ORDER — ACETAMINOPHEN 325 MG/1
650 TABLET ORAL ONCE AS NEEDED
Status: DISCONTINUED | OUTPATIENT
Start: 2019-08-08 | End: 2019-08-08 | Stop reason: HOSPADM

## 2019-08-08 RX ORDER — CLINDAMYCIN PHOSPHATE 900 MG/50ML
900 INJECTION INTRAVENOUS EVERY 8 HOURS
Status: COMPLETED | OUTPATIENT
Start: 2019-08-08 | End: 2019-08-09

## 2019-08-08 RX ORDER — OXYCODONE HYDROCHLORIDE AND ACETAMINOPHEN 5; 325 MG/1; MG/1
2 TABLET ORAL EVERY 4 HOURS PRN
Status: DISCONTINUED | OUTPATIENT
Start: 2019-08-08 | End: 2019-08-10 | Stop reason: HOSPADM

## 2019-08-08 RX ORDER — FERROUS SULFATE 325(65) MG
325 TABLET ORAL
Status: DISCONTINUED | OUTPATIENT
Start: 2019-08-09 | End: 2019-08-10 | Stop reason: HOSPADM

## 2019-08-08 RX ORDER — FAMOTIDINE 10 MG/ML
20 INJECTION, SOLUTION INTRAVENOUS ONCE
Status: COMPLETED | OUTPATIENT
Start: 2019-08-08 | End: 2019-08-08

## 2019-08-08 RX ORDER — SODIUM CHLORIDE, SODIUM LACTATE, POTASSIUM CHLORIDE, CALCIUM CHLORIDE 600; 310; 30; 20 MG/100ML; MG/100ML; MG/100ML; MG/100ML
9 INJECTION, SOLUTION INTRAVENOUS CONTINUOUS
Status: DISCONTINUED | OUTPATIENT
Start: 2019-08-08 | End: 2019-08-10 | Stop reason: HOSPADM

## 2019-08-08 RX ORDER — OXYCODONE HYDROCHLORIDE AND ACETAMINOPHEN 5; 325 MG/1; MG/1
1 TABLET ORAL EVERY 4 HOURS PRN
Status: DISCONTINUED | OUTPATIENT
Start: 2019-08-08 | End: 2019-08-10 | Stop reason: HOSPADM

## 2019-08-08 RX ORDER — PROMETHAZINE HYDROCHLORIDE 25 MG/ML
12.5 INJECTION, SOLUTION INTRAMUSCULAR; INTRAVENOUS ONCE AS NEEDED
Status: DISCONTINUED | OUTPATIENT
Start: 2019-08-08 | End: 2019-08-08 | Stop reason: HOSPADM

## 2019-08-08 RX ORDER — PROMETHAZINE HYDROCHLORIDE 25 MG/1
25 TABLET ORAL ONCE AS NEEDED
Status: DISCONTINUED | OUTPATIENT
Start: 2019-08-08 | End: 2019-08-08 | Stop reason: HOSPADM

## 2019-08-08 RX ORDER — TAMSULOSIN HYDROCHLORIDE 0.4 MG/1
0.4 CAPSULE ORAL NIGHTLY
Status: DISCONTINUED | OUTPATIENT
Start: 2019-08-08 | End: 2019-08-10 | Stop reason: HOSPADM

## 2019-08-08 RX ORDER — DIAZEPAM 5 MG/1
5 TABLET ORAL EVERY 6 HOURS PRN
Status: DISCONTINUED | OUTPATIENT
Start: 2019-08-08 | End: 2019-08-10 | Stop reason: HOSPADM

## 2019-08-08 RX ORDER — ASPIRIN 81 MG/1
81 TABLET ORAL 2 TIMES DAILY WITH MEALS
Status: DISCONTINUED | OUTPATIENT
Start: 2019-08-08 | End: 2019-08-10 | Stop reason: HOSPADM

## 2019-08-08 RX ORDER — PROMETHAZINE HYDROCHLORIDE 25 MG/ML
6.25 INJECTION, SOLUTION INTRAMUSCULAR; INTRAVENOUS
Status: DISCONTINUED | OUTPATIENT
Start: 2019-08-08 | End: 2019-08-08 | Stop reason: HOSPADM

## 2019-08-08 RX ORDER — PROPOFOL 10 MG/ML
VIAL (ML) INTRAVENOUS AS NEEDED
Status: DISCONTINUED | OUTPATIENT
Start: 2019-08-08 | End: 2019-08-08 | Stop reason: SURG

## 2019-08-08 RX ORDER — HYDRALAZINE HYDROCHLORIDE 20 MG/ML
5 INJECTION INTRAMUSCULAR; INTRAVENOUS
Status: DISCONTINUED | OUTPATIENT
Start: 2019-08-08 | End: 2019-08-08 | Stop reason: HOSPADM

## 2019-08-08 RX ORDER — MORPHINE SULFATE 2 MG/ML
6 INJECTION, SOLUTION INTRAMUSCULAR; INTRAVENOUS
Status: DISCONTINUED | OUTPATIENT
Start: 2019-08-08 | End: 2019-08-10 | Stop reason: HOSPADM

## 2019-08-08 RX ORDER — KETOROLAC TROMETHAMINE 15 MG/ML
15 INJECTION, SOLUTION INTRAMUSCULAR; INTRAVENOUS EVERY 8 HOURS PRN
Status: DISCONTINUED | OUTPATIENT
Start: 2019-08-08 | End: 2019-08-08

## 2019-08-08 RX ORDER — DIPHENHYDRAMINE HCL 25 MG
25 CAPSULE ORAL
Status: DISCONTINUED | OUTPATIENT
Start: 2019-08-08 | End: 2019-08-08 | Stop reason: HOSPADM

## 2019-08-08 RX ORDER — SODIUM CHLORIDE 450 MG/100ML
100 INJECTION, SOLUTION INTRAVENOUS CONTINUOUS
Status: DISCONTINUED | OUTPATIENT
Start: 2019-08-08 | End: 2019-08-10 | Stop reason: HOSPADM

## 2019-08-08 RX ORDER — EPHEDRINE SULFATE 50 MG/ML
5 INJECTION, SOLUTION INTRAVENOUS ONCE AS NEEDED
Status: DISCONTINUED | OUTPATIENT
Start: 2019-08-08 | End: 2019-08-08 | Stop reason: HOSPADM

## 2019-08-08 RX ORDER — ONDANSETRON 2 MG/ML
4 INJECTION INTRAMUSCULAR; INTRAVENOUS ONCE AS NEEDED
Status: DISCONTINUED | OUTPATIENT
Start: 2019-08-08 | End: 2019-08-08 | Stop reason: HOSPADM

## 2019-08-08 RX ORDER — DIPHENHYDRAMINE HYDROCHLORIDE 50 MG/ML
12.5 INJECTION INTRAMUSCULAR; INTRAVENOUS
Status: DISCONTINUED | OUTPATIENT
Start: 2019-08-08 | End: 2019-08-08 | Stop reason: HOSPADM

## 2019-08-08 RX ORDER — MIDAZOLAM HYDROCHLORIDE 1 MG/ML
2 INJECTION INTRAMUSCULAR; INTRAVENOUS
Status: DISCONTINUED | OUTPATIENT
Start: 2019-08-08 | End: 2019-08-08 | Stop reason: HOSPADM

## 2019-08-08 RX ORDER — SODIUM CHLORIDE 0.9 % (FLUSH) 0.9 %
1-10 SYRINGE (ML) INJECTION AS NEEDED
Status: DISCONTINUED | OUTPATIENT
Start: 2019-08-08 | End: 2019-08-10 | Stop reason: HOSPADM

## 2019-08-08 RX ORDER — SODIUM CHLORIDE 0.9 % (FLUSH) 0.9 %
3 SYRINGE (ML) INJECTION EVERY 12 HOURS SCHEDULED
Status: DISCONTINUED | OUTPATIENT
Start: 2019-08-08 | End: 2019-08-10 | Stop reason: HOSPADM

## 2019-08-08 RX ORDER — GLYCOPYRROLATE 0.2 MG/ML
INJECTION INTRAMUSCULAR; INTRAVENOUS AS NEEDED
Status: DISCONTINUED | OUTPATIENT
Start: 2019-08-08 | End: 2019-08-08 | Stop reason: SURG

## 2019-08-08 RX ORDER — ONDANSETRON 2 MG/ML
INJECTION INTRAMUSCULAR; INTRAVENOUS AS NEEDED
Status: DISCONTINUED | OUTPATIENT
Start: 2019-08-08 | End: 2019-08-08 | Stop reason: SURG

## 2019-08-08 RX ORDER — NALOXONE HCL 0.4 MG/ML
0.4 VIAL (ML) INJECTION
Status: DISCONTINUED | OUTPATIENT
Start: 2019-08-08 | End: 2019-08-10 | Stop reason: HOSPADM

## 2019-08-08 RX ORDER — MAGNESIUM HYDROXIDE 1200 MG/15ML
LIQUID ORAL AS NEEDED
Status: DISCONTINUED | OUTPATIENT
Start: 2019-08-08 | End: 2019-08-08 | Stop reason: HOSPADM

## 2019-08-08 RX ADMIN — CLINDAMYCIN PHOSPHATE 900 MG: 900 INJECTION, SOLUTION INTRAVENOUS at 17:43

## 2019-08-08 RX ADMIN — SODIUM CHLORIDE, POTASSIUM CHLORIDE, SODIUM LACTATE AND CALCIUM CHLORIDE: 600; 310; 30; 20 INJECTION, SOLUTION INTRAVENOUS at 12:39

## 2019-08-08 RX ADMIN — CEFAZOLIN SODIUM 2 G: 2 INJECTION, SOLUTION INTRAVENOUS at 10:54

## 2019-08-08 RX ADMIN — HYDROMORPHONE HYDROCHLORIDE 0.5 MG: 2 INJECTION INTRAMUSCULAR; INTRAVENOUS; SUBCUTANEOUS at 12:32

## 2019-08-08 RX ADMIN — HYDROMORPHONE HYDROCHLORIDE 1 MG: 2 INJECTION INTRAMUSCULAR; INTRAVENOUS; SUBCUTANEOUS at 12:48

## 2019-08-08 RX ADMIN — ONDANSETRON 4 MG: 2 INJECTION INTRAMUSCULAR; INTRAVENOUS at 12:24

## 2019-08-08 RX ADMIN — OXYCODONE HYDROCHLORIDE AND ACETAMINOPHEN 1 TABLET: 5; 325 TABLET ORAL at 17:43

## 2019-08-08 RX ADMIN — LABETALOL 20 MG/4 ML (5 MG/ML) INTRAVENOUS SYRINGE 5 MG: at 13:28

## 2019-08-08 RX ADMIN — ASPIRIN 81 MG: 81 TABLET, COATED ORAL at 17:44

## 2019-08-08 RX ADMIN — HYDROMORPHONE HYDROCHLORIDE 0.5 MG: 1 INJECTION, SOLUTION INTRAMUSCULAR; INTRAVENOUS; SUBCUTANEOUS at 13:23

## 2019-08-08 RX ADMIN — HYDROMORPHONE HYDROCHLORIDE 0.5 MG: 1 INJECTION, SOLUTION INTRAMUSCULAR; INTRAVENOUS; SUBCUTANEOUS at 13:05

## 2019-08-08 RX ADMIN — FENTANYL CITRATE 150 MCG: 50 INJECTION, SOLUTION INTRAMUSCULAR; INTRAVENOUS at 10:47

## 2019-08-08 RX ADMIN — TRANEXAMIC ACID 1000 MG: 100 INJECTION, SOLUTION INTRAVENOUS at 11:02

## 2019-08-08 RX ADMIN — TAMSULOSIN HYDROCHLORIDE 0.4 MG: 0.4 CAPSULE ORAL at 21:44

## 2019-08-08 RX ADMIN — SODIUM CHLORIDE, POTASSIUM CHLORIDE, SODIUM LACTATE AND CALCIUM CHLORIDE 9 ML/HR: 600; 310; 30; 20 INJECTION, SOLUTION INTRAVENOUS at 08:40

## 2019-08-08 RX ADMIN — GLYCOPYRROLATE 0.4 MG: 0.2 INJECTION INTRAMUSCULAR; INTRAVENOUS at 12:26

## 2019-08-08 RX ADMIN — ACETAMINOPHEN 1000 MG: 500 TABLET, FILM COATED ORAL at 08:40

## 2019-08-08 RX ADMIN — LISINOPRIL 10 MG: 10 TABLET ORAL at 17:44

## 2019-08-08 RX ADMIN — PROPOFOL 200 MG: 10 INJECTION, EMULSION INTRAVENOUS at 10:47

## 2019-08-08 RX ADMIN — FENTANYL CITRATE 50 MCG: 50 INJECTION, SOLUTION INTRAMUSCULAR; INTRAVENOUS at 13:00

## 2019-08-08 RX ADMIN — LINAGLIPTIN 5 MG: 5 TABLET, FILM COATED ORAL at 17:44

## 2019-08-08 RX ADMIN — MUPIROCIN 1 APPLICATION: 20 OINTMENT TOPICAL at 21:44

## 2019-08-08 RX ADMIN — FENTANYL CITRATE 50 MCG: 50 INJECTION, SOLUTION INTRAMUSCULAR; INTRAVENOUS at 11:29

## 2019-08-08 RX ADMIN — SODIUM CHLORIDE, PRESERVATIVE FREE 3 ML: 5 INJECTION INTRAVENOUS at 21:45

## 2019-08-08 RX ADMIN — ATORVASTATIN CALCIUM 10 MG: 10 TABLET, FILM COATED ORAL at 21:44

## 2019-08-08 RX ADMIN — OXYCODONE HYDROCHLORIDE AND ACETAMINOPHEN 2 TABLET: 5; 325 TABLET ORAL at 21:44

## 2019-08-08 RX ADMIN — HYDROMORPHONE HYDROCHLORIDE 0.5 MG: 2 INJECTION INTRAMUSCULAR; INTRAVENOUS; SUBCUTANEOUS at 12:42

## 2019-08-08 RX ADMIN — CEFAZOLIN SODIUM 2 G: 2 INJECTION, SOLUTION INTRAVENOUS at 12:20

## 2019-08-08 RX ADMIN — ROPIVACAINE HYDROCHLORIDE 20 ML: 5 INJECTION, SOLUTION EPIDURAL; INFILTRATION; PERINEURAL at 09:26

## 2019-08-08 RX ADMIN — LIDOCAINE HYDROCHLORIDE 60 MG: 20 INJECTION, SOLUTION INFILTRATION; PERINEURAL at 10:47

## 2019-08-08 RX ADMIN — ROCURONIUM BROMIDE 50 MG: 10 INJECTION INTRAVENOUS at 10:47

## 2019-08-08 RX ADMIN — MIDAZOLAM 1 MG: 1 INJECTION INTRAMUSCULAR; INTRAVENOUS at 09:03

## 2019-08-08 RX ADMIN — FENTANYL CITRATE 50 MCG: 50 INJECTION, SOLUTION INTRAMUSCULAR; INTRAVENOUS at 11:24

## 2019-08-08 RX ADMIN — SODIUM CHLORIDE 100 ML/HR: 4.5 INJECTION, SOLUTION INTRAVENOUS at 17:43

## 2019-08-08 RX ADMIN — FAMOTIDINE 20 MG: 10 INJECTION INTRAVENOUS at 09:03

## 2019-08-08 RX ADMIN — NEOSTIGMINE METHYLSULFATE 2 MG: 1 INJECTION INTRAMUSCULAR; INTRAVENOUS; SUBCUTANEOUS at 12:26

## 2019-08-08 NOTE — H&P
"  Orthopaedic Surgery History and Physical    Patient Name:  Luis Diaz  YOB: 1945    Medical Records Number:  [id number]    Date of Admission:  8/8/2019  7:30 AM    Chief Complaint:  OA (osteoarthritis) of knee [M17.10]    Luis Diaz is a 73 y.o. male who presents c/o severe right knee pain.  The pain has been on and off for many years, worsening to the point where the pain is becoming disabling.  The pain is a constant dull ache with occasional sharp, stabbing pain.  The patient has failed conservative treatment and would like to proceed with right total knee arthroplasty.    /90 (BP Location: Right arm, Patient Position: Lying)   Pulse 69   Temp 97.8 °F (36.6 °C) (Oral)   Resp 18   Ht 171.5 cm (67.5\")   Wt 108 kg (238 lb 2 oz)   SpO2 97%   BMI 36.75 kg/m²     Past Medical History:    Past Medical History:   Diagnosis Date   • B12 deficiency    • BPH (benign prostatic hyperplasia)    • Cancer (CMS/HCC)     Colon   • Colostomy in place (CMS/HCC)     LEFT   • Decreased platelet count (CMS/HCC)    • Diabetes mellitus (CMS/HCC)    • Hypertension    • Lung nodule    • Polio    • PSA elevation    • Pseudothrombocytopenia     HAS TO HAVE BLOOD DRAWN IN BLUE TUBE   • Right knee pain    • Shingles    • Sleep apnea     CPAP       Social History:    Social History     Socioeconomic History   • Marital status:      Spouse name: Not on file   • Number of children: Not on file   • Years of education: Not on file   • Highest education level: Not on file   Tobacco Use   • Smoking status: Never Smoker   • Smokeless tobacco: Never Used   Substance and Sexual Activity   • Alcohol use: No   • Drug use: No   • Sexual activity: Defer       Family History:    Family History   Problem Relation Age of Onset   • Emphysema Mother    • Lung cancer Father    • Malig Hyperthermia Neg Hx        Current Medications:    Current Facility-Administered Medications:   •  ceFAZolin in dextrose (ANCEF) " IVPB solution 2 g, 2 g, Intravenous, 30 Min Pre-Op, Luis Roman MD  •  famotidine (PEPCID) injection 20 mg, 20 mg, Intravenous, Once, Argenis Benjamin MD  •  fentaNYL citrate (PF) (SUBLIMAZE) injection 50 mcg, 50 mcg, Intravenous, Q15 Min PRN, Argenis Benjamin MD  •  lactated ringers infusion, 9 mL/hr, Intravenous, Continuous, Argenis Benjamin MD, Last Rate: 9 mL/hr at 08/08/19 0840, 9 mL/hr at 08/08/19 0840  •  midazolam (VERSED) injection 1 mg, 1 mg, Intravenous, Q5 Min PRN **OR** midazolam (VERSED) injection 2 mg, 2 mg, Intravenous, Q5 Min PRN, Argenis Benjamin MD  •  ropivacaine (NAROPIN) 0.5 % 50 mL, Morphine (PF) 5 mg, EPINEPHrine (ADRENALIN) 0.3 mg in sodium chloride 0.9 % 100.8 mL, , Injection, Once, Luis Roman MD  •  sodium chloride 0.9 % flush 3 mL, 3 mL, Intravenous, Q12H, Argenis Benjamin MD  •  sodium chloride 0.9 % flush 3-10 mL, 3-10 mL, Intravenous, PRN, Argenis Benjamin MD    Allergies:    Allergies   Allergen Reactions   • Toradol [Ketorolac Tromethamine] Other (See Comments)     syncope       Review of Systems:   HEENT: Patient denies any headaches, vision changes, change in hearing, or tinnitus, Patient denies any rhinorrhea,epistaxis, sinus pain, mouth or dental problems, sore throat or hoarseness, or dysphagia  Pulmonary: Patient denies any cough, congestion, SOA, or wheezing  Cardiovascular: Patient denies any chest pain, dyspnea, palpitations, weakness, intolerance of exercise, varicosities, swelling of extremities, known murmur  Gastrointestinal:  Patient denies nausea, vomiting, diarrhea, constipation, loss  of appetite, change in appetite, dysphagia, gas, heartburn, melena, change in bowel habits, use of laxatives or other drugs to alter the function of the gastrointestinal tract.  Genital/Urinary: Patient denies dysuria, change in color of urine, change in frequency of urination, pain with urgency, incontinence, retention, or nocturia.  Musculoskeletal: Patient denies  increased warmth; redness; or swelling of joints; limitation of function; deformity; crepitation: pain in a joint or an extremity, the neck, or the back, especially with movement.  Neurological: Patient denies dizziness, tremor, ataxia, difficulty in speaking, change in speech, paresthesia, loss of sensation, seizures, syncope, changes in memory.  Endocrine system: Patient denies tremors, palpitations, intolerance of heat or cold, polyuria, polydipsia, polyphagia, diaphoresis, exophthalmos, or goiter.  Psychological: Patient denies thoughts/plans or harming self or other; depression,  insomnia, night terrors, apoorva, memory loss, disorientation.  Skin: Patient denies any bruising, rashes, discoloration, pruritus, wounds, ulcers, decubiti, changes in the hair or nails  Hematopoietic: Patient denies history of spontaneous or excessive bleeding, epistaxis, hematuria, melena, fatigue, enlarged or tender lymph nodes, pallor, history of anemia.      Physical Exam:   Awake, A&O x3, affect normal, no acute distress  Ambulating with a limp due to knee pain  Knee ROM is limited due to pain (5-115)  Strength is 4/5 in the quad, hamstring and calf  Cap refill is normal, Sensation intact    Card:  RR, HD Stable  Pulm:  Regular breathing, no S.O.A  Abd:  Soft, NT, ND    Lab Results (last 24 hours)     Procedure Component Value Units Date/Time    POC Glucose Once [[id number]]  (Abnormal) Collected:  08/08/19 0751    Specimen:  Blood Updated:  08/08/19 0753     Glucose 175 mg/dL           Xr Knee 1 Or 2 View Right    Result Date: 7/25/2019  Narrative: 2-VIEW RIGHT KNEE  HISTORY: Preop for knee surgery. Knee pain.  FINDINGS: There are severe degenerative changes throughout the knee with joint space narrowing and some articular irregularity, particularly involving the lateral compartment. There is an associated tiny joint effusion.  2-VIEW CHEST  HISTORY: Preop for knee surgery. Previous colon cancer.  FINDINGS: The lungs are  moderately expanded and clear except for a calcified granuloma at the right base. The heart is top normal in size and there is no acute disease or change from 02/27/2014.  This report was finalized on 7/25/2019 2:34 PM by Dr. Min Gao M.D.      Xr Chest Pa & Lateral    Result Date: 7/25/2019  Narrative: 2-VIEW RIGHT KNEE  HISTORY: Preop for knee surgery. Knee pain.  FINDINGS: There are severe degenerative changes throughout the knee with joint space narrowing and some articular irregularity, particularly involving the lateral compartment. There is an associated tiny joint effusion.  2-VIEW CHEST  HISTORY: Preop for knee surgery. Previous colon cancer.  FINDINGS: The lungs are moderately expanded and clear except for a calcified granuloma at the right base. The heart is top normal in size and there is no acute disease or change from 02/27/2014.  This report was finalized on 7/25/2019 2:34 PM by Dr. Min Gao M.D.        Assessment:  End-stage Primary Right Knee Osteoarthritis    Plan:  Patient's pain is becoming disabling, despite extensive conservative treatment.  Radiographs reveal end-stage degenerative changes.  The risks of surgery, including, but not limited to, heart attack, stroke, dying, DVT, nerve injury, vascular injury, arthrofibrosis and infection were discussed.  The alternatives and benefits were also discussed.  All questions answered and the patient wishes to proceed with right total knee arthroplasty.    Luis Roman MD  8/8/2019    CC: Riddhi Ruiz MD, Luis Roman MD

## 2019-08-08 NOTE — PLAN OF CARE
Problem: Patient Care Overview  Goal: Plan of Care Review  Outcome: Ongoing (interventions implemented as appropriate)   08/08/19 3981   Coping/Psychosocial   Plan of Care Reviewed With patient   OTHER   Outcome Summary S/P RTKA. VSS. Pain controlled with PO pain med. Dressing c/d/i. Up to chair and worked with PT today. voiding without difficulty. Discussed blood glucose monitoring and med r/t DM. Plans to d/c home with HH when stable.    Plan of Care Review   Progress improving     Goal: Individualization and Mutuality  Outcome: Ongoing (interventions implemented as appropriate)      Problem: Fall Risk (Adult)  Goal: Identify Related Risk Factors and Signs and Symptoms  Outcome: Outcome(s) achieved Date Met: 08/08/19    Goal: Absence of Fall  Outcome: Ongoing (interventions implemented as appropriate)      Problem: Knee Arthroplasty (Total, Partial) (Adult)  Goal: Signs and Symptoms of Listed Potential Problems Will be Absent, Minimized or Managed (Knee Arthroplasty)  Outcome: Ongoing (interventions implemented as appropriate)    Goal: Anesthesia/Sedation Recovery  Outcome: Ongoing (interventions implemented as appropriate)

## 2019-08-08 NOTE — PLAN OF CARE
Problem: Patient Care Overview  Goal: Plan of Care Review  Outcome: Ongoing (interventions implemented as appropriate)   08/08/19 9252   Coping/Psychosocial   Plan of Care Reviewed With patient   OTHER   Outcome Summary Pt is post op R TKA on 8/8. Pt presents with decreased R knee ROM and R LE strength post op. Pt reports his leg feels unsteady and was educated on proper gait mechanics to ensure safety. Pt demonstrates difficulty walking secondary to pain. Pt is a good candidate for skilled therapy to improve strength, ROM, gait, and work on stair navigation to increase functional independence.

## 2019-08-08 NOTE — DISCHARGE PLACEMENT REQUEST
"Clarissa Diaz (73 y.o. Male)     Date of Birth Social Security Number Address Home Phone MRN    1945  [address] [address] [phone] [id number]    [employer]oo Marital Status          Zoroastrian        Admission Date Admission Type Admitting Provider Attending Provider Department, Room/Bed    8/8/19 Elective Clarissa Roman MD Goodin, Robert A, MD [address], P878/1    Discharge Date Discharge Disposition Discharge Destination                       Attending Provider:  Clarissa Roman MD    Allergies:  Toradol [Ketorolac Tromethamine]    Isolation:  None   Infection:  None   Code Status:  CPR    Ht:  171.5 cm (67.5\")   Wt:  108 kg (238 lb 2 oz)    Admission Cmt:  None   Principal Problem:  None                Active Insurance as of 8/8/2019     Primary Coverage     Payor Plan Insurance Group Employer/Plan Group    [facility] MEDICARE REPLACEMENT [facility] [id number]     Payor Plan Address Payor Plan Phone Number Payor Plan Fax Number Effective Dates    [address] [address]   1/1/2016 - None Entered    [facility] [id number]       Subscriber Name Subscriber Birth Date Member ID       CLARISSA DIAZ 1945 [id number]                 Emergency Contacts      (Rel.) Home Phone Work Phone Mobile Phone    Marques Diaz (Spouse) [phone] -- [phone]    IsaelMariaa (Daughter) [phone] -- --        "

## 2019-08-08 NOTE — ANESTHESIA PROCEDURE NOTES
Peripheral Block    Pre-sedation assessment completed: 8/8/2019 9:18 AM    Patient reassessed immediately prior to procedure    Patient location during procedure: holding area  Start time: 8/8/2019 9:19 AM  Stop time: 8/8/2019 9:24 AM  Performed by  Anesthesiologist: Elmer Carnes MD  Preanesthetic Checklist  Completed: patient identified, site marked, surgical consent, pre-op evaluation, timeout performed, IV checked, risks and benefits discussed and monitors and equipment checked  Prep:  Pt Position: supine  Sterile barriers:cap, gloves, mask and sterile barriers  Prep: ChloraPrep  Patient monitoring: blood pressure monitoring, continuous pulse oximetry and EKG  Procedure  Sedation:yes  Performed under: local infiltration  Guidance:ultrasound guided  ULTRASOUND INTERPRETATION. Using ultrasound guidance a 21 G gauge needle was placed in close proximity to the nerve, at which point, under ultrasound guidance anesthetic was injected in the area of the nerve and spread of the anesthesia was seen on ultrasound in close proximity thereto.  There were no abnormalities seen on ultrasound; a digital image was taken; and the patient tolerated the procedure with no complications. Images:still images obtained, printed/placed on chart    Laterality:right  Block Type:adductor canal block  Injection Technique:single-shot  Needle Type:echogenic and Tuohy  Needle Gauge:21 G      Medications Used: ropivacaine (NAROPIN) 0.5 % injection, 20 mL      Post Assessment  Injection Assessment: negative aspiration for heme, no paresthesia on injection and incremental injection  Patient Tolerance:comfortable throughout block  Complications:no  Additional Notes  Ultrasound used for needle guidance during peripheral nerve block

## 2019-08-08 NOTE — OP NOTE
Orthopaedic Surgery Operative Note    Patient Name:  Luis Diaz  YOB: 1945  Medical Records Number:  [id number]    Date of Procedure:  8/8/2019    Pre-operative Diagnosis:  Primary Osteoarthritis Right Knee    Post-operative Diagnosis:  Primary Osteoarthritis Right Knee    Procedure Performed:  Right Total Knee Arthroplasty    Implants:  Biomet Vanguard TKA, 67.5 Femoral Component, 75 Tibial Tray with a 40 mm Modular Stem, 34 3-Peg Patella, 14 std Polyethylene Insert    Surgeon:  Luis Roman M.D.    Assistant: Tayla Moore (who was present during the critical portions of the case, thereby decreasing operative time and patient morbidity)    Anesthetic Type:  General    Estimated Blood Loss:  250cc's    Specimens: * No orders in the log *    No Complications      Indications for Procedure:  Luis Diaz is a 73 y.o. male suffering from end stage degenerative changes in the right knee.  The patients pain is becoming disabling, despite extensive conservative care, including NSAIDS, therapy and injections.  The risks, benefits and alternatives were discussed and the patient wishes to proceed with right total knee arthroplasty.      Procedure Performed:    After informed consent was obtained and pre-operative IV Kefzol given, prior to tourniquet inflation, the patient was taken to the operating room and placed supine on the operating table.  After general anesthesia induced and 1 gm of Tranxemic Acid given,the patient's right lower extremity was prepped with ChloraPrep and draped in a sterile fashion.    A midline incision was made overlying the right knee and we sharply dissected down to expose the parapatellar retinaculum.  A mid-vastus, muscle sparing, parapatellar arthrotomy was performed and we elevated the soft tissue both medially and laterally.  The menisci and ACL were excised.  We everted the patella and measured this to be 25 mm and we removed 10 mm of bone down to 15mm.  We  measured the patella to be 34 and drilled our three drill holes.  We protected the patella with the patella protector and turned our attention to the femur and the tibia.    We drilled drill holes into the femoral and the tibial intramedullary canals and proceeded to irrigate the canals to remove the fatty marrow.  We used the intramedullary audra and the 5 degree valgus cut block to make our distal femoral cut.  Once we had a smooth surface, we measured the femur to be 67.5.  We assured we had rotational alignment using the epicondylar axis, then we used the four-in-one cut block to make our anterior, posterior, anterior and posterior chamfer cuts.  We placed our femoral trial, had excellent fit, and extended the knee and marked Zane's line on the tibia.      We then turned our attention to the tibia, where we irrigated the canal again.  We used the intramedullary audra and the 3 degree posterior sloped cut block to remove 2 mm of bone from the effected side of the tibia.  Prior to making our cut, we assured we had rotational alignment using the external guide and Zane's line.  Once we had a smooth surface, we measured the tibia to be 75.  We then removed soft tissue and bony debris from the posterior aspect of the knee.    We placed our trial implants and had excellent fit, range of motion, stability and ligament balance, throughout a complete arc of motion with a 14 std polyethylene liner.  We removed our trial implants, punched the tibial keel, copiously irrigated the knee, then cemented our implants in place using Biomet cement.  Once the cement cured, we trialed again with the 12, 14 and 16 AS,standard and posterior lipped polyethylene liners.  The 14 std gave us the best range of motion, stability and ligament balance, throughout a complete arc of motion.  We removed the trials, copiously irrigated the knee, gave IV antibiotics and local anesthetic, then placed our permanent polyethylene liner.  We  "placed a 1/8\" hemovac drain, then closed the arthrotomy with #1 Vicryl pop-off sutures.  The subcutaneous tissue was closed with 2-0 vicryl pop-off sutures.  The skin was closed with staples.  We placed a sterile dressing of Xeroform, 4x4's, abdominal pads, cast padding and an Ace Wrap.  The patient was then awakened from general anesthesia and taken to the recovery room in stable condition.    The patient will be started on Aspirin 81 mg twice daily for DVT prophylaxis.  IV antibiotics will be discontinued within 24 hours of surgery.  Immediately prior to surgery, there were no acute Thromboembolic nor Cardiovascular risk factors.  An updated Medical Reconciliation form is on the chart.    Luis Roman  "

## 2019-08-08 NOTE — ANESTHESIA PROCEDURE NOTES
Airway  Urgency: elective    Airway not difficult    General Information and Staff    Patient location during procedure: OR  Anesthesiologist: Lizett Ortiz MD  CRNA: Kary Newby CRNA    Indications and Patient Condition  Indications for airway management: airway protection    Preoxygenated: yes  Mask difficulty assessment: 1 - vent by mask    Final Airway Details  Final airway type: endotracheal airway      Successful airway: ETT  Cuffed: yes   Successful intubation technique: direct laryngoscopy  Facilitating devices/methods: intubating stylet  Endotracheal tube insertion site: oral  Blade: Morris  Blade size: 2  ETT size (mm): 7.5  Cormack-Lehane Classification: grade IIb - view of arytenoids or posterior of glottis only  Placement verified by: chest auscultation and capnometry   Measured from: lips  ETT to lips (cm): 22  Number of attempts at approach: 1    Additional Comments  Atraumatic, MOP to cuff, BSBE, no change to dentition, secured with tape

## 2019-08-08 NOTE — THERAPY EVALUATION
Acute Care - Physical Therapy Initial Evaluation  [facility]     Patient Name: Luis Diaz  : 1945  MRN: [id number]  Today's Date: 2019                Admit Date: 2019    Visit Dx: No diagnosis found.  Patient Active Problem List   Diagnosis   • Esophageal dysphagia   • Heartburn   • History of colon cancer   • OA (osteoarthritis) of knee     Past Medical History:   Diagnosis Date   • B12 deficiency    • BPH (benign prostatic hyperplasia)    • Cancer (CMS/HCC)     Colon   • Colostomy in place (CMS/HCC)     LEFT   • Decreased platelet count (CMS/HCC)    • Diabetes mellitus (CMS/HCC)    • Hypertension    • Lung nodule    • Polio    • PSA elevation    • Pseudothrombocytopenia     HAS TO HAVE BLOOD DRAWN IN BLUE TUBE   • Right knee pain    • Shingles    • Sleep apnea     CPAP     Past Surgical History:   Procedure Laterality Date   • CHOLECYSTECTOMY     • COLON RESECTION  1996    Colostomy   • COLONOSCOPY     • ENDOSCOPY N/A 2018    Procedure: ESOPHAGOGASTRODUODENOSCOPY WITH 48FR AND 52FR LITTLEJOHN DILITTATION;  Surgeon: Morgan Dhaliwal MD;  Location: [facility] ENDOSCOPY;  Service: Gastroenterology   • FOOT SURGERY Left    • HIP ARTHROPLASTY Right    • JOINT REPLACEMENT     • KNEE ARTHROPLASTY Left        • ORIF FINGER / THUMB FRACTURE Left    • SIGMOIDOSCOPY N/A 2018    Procedure: FLEXIBLE SIGMOIDOSCOPY;  Surgeon: Morgan Dhaliwal MD;  Location: [facility] ENDOSCOPY;  Service: Gastroenterology   • TONSILLECTOMY          PT ASSESSMENT (last 12 hours)      Physical Therapy Evaluation    No documentation.       Physical Therapy Education     Title: PT OT SLP Therapies (Done)     Topic: Physical Therapy (Done)     Point: Mobility training (Done)     Learning Progress Summary           Patient Acceptance, E,TB,D, VU,NR by PHU at 2019  4:24 PM                   Point: Home exercise program (Done)     Learning Progress Summary           Patient Acceptance, E,TB,D, VU,NR by PHU at 2019   4:24 PM                   Point: Body mechanics (Done)     Learning Progress Summary           Patient Acceptance, E,TB,D, VU,NR by PHU at 8/8/2019  4:24 PM                   Point: Precautions (Done)     Learning Progress Summary           Patient Acceptance, E,TB,D, VU,NR by  at 8/8/2019  4:24 PM                               User Key     Initials Effective Dates Name Provider Type Discipline     07/05/19 -  Vu Garcia., PT Student PT Student PT              PT Recommendation and Plan  Anticipated Discharge Disposition (PT): (P) home with home health  Planned Therapy Interventions (PT Eval): (P) balance training, bed mobility training, gait training, home exercise program, patient/family education, ROM (range of motion), stair training, transfer training  Therapy Frequency (PT Clinical Impression): (P) 2 times/day  Outcome Summary/Treatment Plan (PT)  Anticipated Discharge Disposition (PT): (P) home with home health  Plan of Care Reviewed With: (P) patient  Outcome Summary: (P) Pt is post op R TKA on 8/8. Pt presents with decreased R knee ROM and R LE strength post op. Pt reports his leg feels unsteady and was educated on proper gait mechanics to ensure safety. Pt demonstrates difficulty walking secondary to pain. Pt is a good candidate for skilled therapy to improve strength, ROM, gait, and work on stair navigation to increase functional independence.      Time Calculation:   PT Charges     Row Name 08/08/19 1627             Time Calculation    Start Time  1600  (Pended)   -BK      Stop Time  1617  (Pended)   -BK      Time Calculation (min)  17 min  (Pended)   -BK      PT Received On  08/08/19  (Pended)   -BK      PT - Next Appointment  08/09/19  (Pended)   -BK      PT Goal Re-Cert Due Date  08/11/19  (Pended)   -BK         Time Calculation- PT    Total Timed Code Minutes- PT  12 minute(s)  (Pended)   -BK        User Key  (r) = Recorded By, (t) = Taken By, (c) = Cosigned By    Initials Name Provider  Type    BK Vu Garcia., PT Student PT Student        Therapy Charges for Today     Code Description Service Date Service Provider Modifiers Qty    55140014173 HC PT EVAL MOD COMPLEXITY 2 8/8/2019 Vu Garcia., PT Student GP 1    78722487085 HC PT THER PROC EA 15 MIN 8/8/2019 Vu Garcia., PT Student GP 1          PT G-Codes  Outcome Measure Options: (P) AM-PAC 6 Clicks Basic Mobility (PT)  AM-PAC 6 Clicks Score (PT): (P) 20      Radha Womack, PT Student  8/8/2019

## 2019-08-08 NOTE — ANESTHESIA PREPROCEDURE EVALUATION
Anesthesia Evaluation     no history of anesthetic complications:               Airway   Mallampati: II  TM distance: >3 FB  Neck ROM: full  Large neck circumference  Comment: Large tongue  Dental - normal exam     Pulmonary    (+) sleep apnea on CPAP,   (-) asthma, shortness of breath, recent URI, not a smoker    ROS comment: Lung nodule  Cardiovascular     (+) hypertension,   (-) dysrhythmias, angina, hyperlipidemia    ROS comment: Min ST elevation ant leads    Neuro/Psych  (-) dizziness/light headedness, syncope  GI/Hepatic/Renal/Endo    (+) obesity,   diabetes mellitus,     Musculoskeletal     Abdominal    Substance History      OB/GYN          Other   (+) blood dyscrasia (pseudothrombocytopenia)                     Anesthesia Plan    ASA 3     general with block     intravenous induction   Anesthetic plan, all risks, benefits, and alternatives have been provided, discussed and informed consent has been obtained with: patient.

## 2019-08-08 NOTE — ANESTHESIA POSTPROCEDURE EVALUATION
"Patient: Luis Diaz    Procedure Summary     Date:  08/08/19 Room / Location:  [facility] OR [address] [place] [address] MAIN OR    Anesthesia Start:  1040 Anesthesia Stop:  1249    Procedure:  RIGHT TOTAL KNEE ARTHROPLASTY (Right Knee) Diagnosis:      Surgeon:  Luis Roman MD Provider:  Lizett Ortiz MD    Anesthesia Type:  general with block ASA Status:  3          Anesthesia Type: general with block  Last vitals  BP   177/91 (08/08/19 1310)   Temp   36.8 °C (98.3 °F) (08/08/19 1245)   Pulse   76 (08/08/19 1310)   Resp   16 (08/08/19 1310)     SpO2   97 % (08/08/19 1310)     Post Anesthesia Care and Evaluation    Patient location during evaluation: PACU  Patient participation: complete - patient participated  Level of consciousness: awake and alert  Pain management: adequate  Airway patency: patent  Anesthetic complications: No anesthetic complications    Cardiovascular status: acceptable  Respiratory status: acceptable  Hydration status: acceptable    Comments: /91   Pulse 76   Temp 36.8 °C (98.3 °F) (Oral)   Resp 16   Ht 171.5 cm (67.5\")   Wt 108 kg (238 lb 2 oz)   SpO2 97%   BMI 36.75 kg/m²         "

## 2019-08-09 LAB
ANION GAP SERPL CALCULATED.3IONS-SCNC: 12.6 MMOL/L (ref 5–15)
BUN BLD-MCNC: 11 MG/DL (ref 8–23)
BUN/CREAT SERPL: 10.5 (ref 7–25)
CALCIUM SPEC-SCNC: 8.7 MG/DL (ref 8.6–10.5)
CHLORIDE SERPL-SCNC: 101 MMOL/L (ref 98–107)
CO2 SERPL-SCNC: 26.4 MMOL/L (ref 22–29)
CREAT BLD-MCNC: 1.05 MG/DL (ref 0.76–1.27)
DEPRECATED RDW RBC AUTO: 42.6 FL (ref 37–54)
ERYTHROCYTE [DISTWIDTH] IN BLOOD BY AUTOMATED COUNT: 12.6 % (ref 12.3–15.4)
GFR SERPL CREATININE-BSD FRML MDRD: 69 ML/MIN/1.73
GLUCOSE BLD-MCNC: 206 MG/DL (ref 65–99)
GLUCOSE BLDC GLUCOMTR-MCNC: 177 MG/DL (ref 70–130)
HCT VFR BLD AUTO: 43.9 % (ref 37.5–51)
HGB BLD-MCNC: 13.9 G/DL (ref 13–17.7)
MCH RBC QN AUTO: 29.1 PG (ref 26.6–33)
MCHC RBC AUTO-ENTMCNC: 31.7 G/DL (ref 31.5–35.7)
MCV RBC AUTO: 92 FL (ref 79–97)
PLATELET # BLD AUTO: 144 10*3/MM3 (ref 140–450)
PMV BLD AUTO: 11.4 FL (ref 6–12)
POTASSIUM BLD-SCNC: 4 MMOL/L (ref 3.5–5.2)
RBC # BLD AUTO: 4.77 10*6/MM3 (ref 4.14–5.8)
SODIUM BLD-SCNC: 140 MMOL/L (ref 136–145)
WBC NRBC COR # BLD: 9.19 10*3/MM3 (ref 3.4–10.8)

## 2019-08-09 PROCEDURE — 80048 BASIC METABOLIC PNL TOTAL CA: CPT | Performed by: ORTHOPAEDIC SURGERY

## 2019-08-09 PROCEDURE — 82962 GLUCOSE BLOOD TEST: CPT

## 2019-08-09 PROCEDURE — 97110 THERAPEUTIC EXERCISES: CPT

## 2019-08-09 PROCEDURE — 97150 GROUP THERAPEUTIC PROCEDURES: CPT

## 2019-08-09 PROCEDURE — 85027 COMPLETE CBC AUTOMATED: CPT | Performed by: ORTHOPAEDIC SURGERY

## 2019-08-09 RX ADMIN — LISINOPRIL 10 MG: 10 TABLET ORAL at 08:22

## 2019-08-09 RX ADMIN — ASPIRIN 81 MG: 81 TABLET, COATED ORAL at 17:59

## 2019-08-09 RX ADMIN — CLINDAMYCIN PHOSPHATE 900 MG: 900 INJECTION, SOLUTION INTRAVENOUS at 00:44

## 2019-08-09 RX ADMIN — OXYCODONE HYDROCHLORIDE AND ACETAMINOPHEN 2 TABLET: 5; 325 TABLET ORAL at 09:48

## 2019-08-09 RX ADMIN — OXYCODONE HYDROCHLORIDE AND ACETAMINOPHEN 2 TABLET: 5; 325 TABLET ORAL at 13:50

## 2019-08-09 RX ADMIN — DOCUSATE SODIUM 100 MG: 100 CAPSULE, LIQUID FILLED ORAL at 20:44

## 2019-08-09 RX ADMIN — OXYCODONE HYDROCHLORIDE AND ACETAMINOPHEN 2 TABLET: 5; 325 TABLET ORAL at 05:55

## 2019-08-09 RX ADMIN — OXYCODONE HYDROCHLORIDE AND ACETAMINOPHEN 1 TABLET: 5; 325 TABLET ORAL at 17:59

## 2019-08-09 RX ADMIN — SODIUM CHLORIDE, PRESERVATIVE FREE 3 ML: 5 INJECTION INTRAVENOUS at 08:22

## 2019-08-09 RX ADMIN — OXYCODONE HYDROCHLORIDE AND ACETAMINOPHEN 2 TABLET: 5; 325 TABLET ORAL at 01:46

## 2019-08-09 RX ADMIN — TAMSULOSIN HYDROCHLORIDE 0.4 MG: 0.4 CAPSULE ORAL at 20:44

## 2019-08-09 RX ADMIN — OXYCODONE HYDROCHLORIDE AND ACETAMINOPHEN 1 TABLET: 5; 325 TABLET ORAL at 22:01

## 2019-08-09 RX ADMIN — ATORVASTATIN CALCIUM 10 MG: 10 TABLET, FILM COATED ORAL at 20:44

## 2019-08-09 RX ADMIN — DIAZEPAM 5 MG: 5 TABLET ORAL at 20:44

## 2019-08-09 RX ADMIN — LINAGLIPTIN 5 MG: 5 TABLET, FILM COATED ORAL at 08:22

## 2019-08-09 RX ADMIN — MUPIROCIN 1 APPLICATION: 20 OINTMENT TOPICAL at 08:22

## 2019-08-09 RX ADMIN — FERROUS SULFATE TAB 325 MG (65 MG ELEMENTAL FE) 325 MG: 325 (65 FE) TAB at 08:22

## 2019-08-09 RX ADMIN — MUPIROCIN 1 APPLICATION: 20 OINTMENT TOPICAL at 20:44

## 2019-08-09 RX ADMIN — ASPIRIN 81 MG: 81 TABLET, COATED ORAL at 08:22

## 2019-08-09 NOTE — THERAPY TREATMENT NOTE
Acute Care - Physical Therapy Treatment Note  [facility]     Patient Name: Luis Diaz  : 1945  MRN: [id number]  Today's Date: 2019             Admit Date: 2019    Visit Dx:  No diagnosis found.  Patient Active Problem List   Diagnosis   • Esophageal dysphagia   • Heartburn   • History of colon cancer   • OA (osteoarthritis) of knee       Therapy Treatment    Rehabilitation Treatment Summary     Row Name 19 1640 19          Treatment Time/Intention    Discipline  physical therapy assistant  -  physical therapy assistant  -     Document Type  therapy note (daily note)  -  therapy note (daily note)  -     Subjective Information  complains of;fatigue;pain  -  complains of;fatigue;pain;swelling  -     Mode of Treatment  group therapy;physical therapy  -  group therapy;physical therapy  -     Care Plan Review  patient/other agree to care plan  -  patient/other agree to care plan  -     Care Plan Review, Other Participant(s)  spouse  -  spouse  -     Comment  --  steppage gt w/L LLE due to polio  -     Existing Precautions/Restrictions  fall  -  fall  -     Recorded by [] Mary Morris, \[facility]\"" 19 1641 [JM] Mary Morris, \[facility]\"" 19 0936     Row Name 19 1640 19          Sit-Stand Transfer    Sit-Stand [place] (Transfers)  minimum assist (75% patient effort);contact guard;verbal cues  -  minimum assist (75% patient effort);contact guard;verbal cues  -     Assistive Device (Sit-Stand Transfers)  walker front-wheeled  -  walker, front-wheeled  -     Recorded by [YOAV] Mary Morris, \[facility]\"" 19 1641 [JM] Mary Morris, \[facility]\"" 19 1637     Row Name 19 1640 19          Gait/Stairs Assessment/Training    [place] Level (Gait)  contact guard;verbal cues  -  contact guard;verbal cues  -     Assistive Device (Gait)  walker, front-wheeled  -  walker, front-wheeled  -     Distance in  Feet (Gait)  100  -JM  50  -JM     Deviations/Abnormal Patterns (Gait)  courtney decreased  -  courtney decreased  -     Bilateral Gait Deviations  weight shift ability decreased;forward flexed posture  -JM  weight shift ability decreased;forward flexed posture  -JM     Negotiation (Stairs)  --  --  -JM     [place] Level (Stairs)  --  minimum assist (75% patient effort);contact guard;verbal cues;nonverbal cues (demo/gesture)  -     Handrail Location (Stairs)  --  both sides  -     Number of Steps (Stairs)  --  4  -JM     Ascending Technique (Stairs)  --  step-to-step  -JM     Descending Technique (Stairs)  --  step-to-step  -JM     Stairs, Safety Issues  --  balance decreased during turns  -     Stairs, Impairments  --  strength decreased;impaired balance  -     Comment (Gait/Stairs)  --  pt has slight difficulty due to LLE having polio   -JM     Recorded by [] Mary Morris, [facility] 08/09/19 1641 [] Mary Morris, [facility] 08/09/19 1637     Row Name 08/09/19 0922             General ROM    GENERAL ROM COMMENTS  -6-100 R knee  -      Recorded by [JM] Mary Morris, [facility] 08/09/19 1637      Row Name 08/09/19 1640 08/09/19 0922          Therapeutic Exercise    Comment (Therapeutic Exercise)  TKR protocol x 20 reps  -JM  TKR protocol x 15 reps  -JM     Recorded by [] Mary Morris, [facility] 08/09/19 1641 [] Mary Morris, [facility] 08/09/19 1637     Row Name 08/09/19 1640 08/09/19 0922          Positioning and Restraints    Pre-Treatment Position  sitting in chair/recliner  -  sitting in chair/recliner  -JM     In Chair  reclined;call light within reach;encouraged to call for assist;exit alarm on;with family/caregiver  -JM  reclined;call light within reach;encouraged to call for assist;exit alarm on;with family/caregiver  -JM     Recorded by [] Mary Morris, [facility] 08/09/19 1641 [] Mary Morris, [facility] 08/09/19 1637     Row Name 08/09/19 1640 08/09/19 0922          Pain Scale: Numbers  Pre/Post-Treatment    Pain Scale: Numbers, Post-Treatment  6/10  -JM  7/10  -JM     Pain Location - Side  Right  -JM  Right  -JM     Pain Location  knee  -JM  knee  -JM     Pain Intervention(s)  Medication (See MAR);Repositioned;Cold applied  -  Medication (See MAR);Repositioned;Cold applied  -JM     Recorded by [JM] Mary Morris, SHERLYN 08/09/19 1641 [JM] Mary Morris, SHERLYN 08/09/19 1637     Row Name                Wound 08/08/19 1157 Right knee Incision    Wound - Properties Group Date first assessed: 08/08/19 [RG] Time first assessed: 1157 [RG] Side: Right [RG] Location: knee [RG] Primary Wound Type: Incision [RG] Recorded by:  [RG] Annemarie Giles RN 08/08/19 1157      User Key  (r) = Recorded By, (t) = Taken By, (c) = Cosigned By    Initials Name Effective Dates Discipline    Annemarie Larios RN 06/16/16 -  Nurse    Mary Saravia, SHERLYN 03/07/18 -  PT          Wound 08/08/19 1157 Right knee Incision (Active)   Dressing Appearance dry;intact;no drainage 8/9/2019  3:55 PM   Closure LEONARD 8/9/2019  3:55 PM   Base clean;dry 8/9/2019  3:55 PM   Dressing Care, Wound dressing changed;dressing applied 8/9/2019  8:22 AM           Physical Therapy Education     Title: PT OT SLP Therapies (Done)     Topic: Physical Therapy (Done)     Point: Mobility training (Done)     Learning Progress Summary           Patient Eager, E,TB,D, VU,NR by YOAV at 8/9/2019  4:38 PM    Acceptance, E,TB,D, VU,NR by PHU at 8/8/2019  4:24 PM   Family Eager, E,TB,D, VU,NR by YOAV at 8/9/2019  4:38 PM                   Point: Home exercise program (Done)     Learning Progress Summary           Patient Eager, E,TB,D, VU,NR by YOAV at 8/9/2019  4:38 PM    Acceptance, E,TB,D, VU,NR by PHU at 8/8/2019  4:24 PM   Family Eager, E,TB,D, VU,NR by YOAV at 8/9/2019  4:38 PM                   Point: Body mechanics (Done)     Learning Progress Summary           Patient Eager, MARIANNA VAZQUEZ D, RODRIGO,NR by YOAV at 8/9/2019  4:38 PM    Acceptance, MARIANNA VAZQUEZ D, RODRIGO,NR by PHU  at 8/8/2019  4:24 PM   Family Eager, E,TB,D, VU,NR by YOAV at 8/9/2019  4:38 PM                   Point: Precautions (Done)     Learning Progress Summary           Patient Eager, E,TB,D, VU,NR by YOAV at 8/9/2019  4:38 PM    Acceptance, E,TB,D, VU,NR by PHU at 8/8/2019  4:24 PM   Family Eager, E,TB,D, VU,NR by YOAV at 8/9/2019  4:38 PM                               User Key     Initials Effective Dates Name Provider Type Discipline     03/07/18 -  Mary Morris PTA Physical Therapy Assistant PT    PHU 07/05/19 -  Rose Garcia, PT Student PT Student PT                PT Recommendation and Plan     Plan of Care Reviewed With: patient, spouse  Progress: improving  Outcome Summary: incr amb dist, has slight steppage gt LLE due to minimal polio as a child per pt; plans DC home SAT  Outcome Measures     Row Name 08/09/19 1600             How much help from another person do you currently need...    Turning from your back to your side while in flat bed without using bedrails?  4  -JM      Moving from lying on back to sitting on the side of a flat bed without bedrails?  4  -JM      Moving to and from a bed to a chair (including a wheelchair)?  3  -JM      Standing up from a chair using your arms (e.g., wheelchair, bedside chair)?  3  -JM      Climbing 3-5 steps with a railing?  3  -JM      To walk in hospital room?  3  -JM      AM-PAC 6 Clicks Score (PT)  20  -JM        User Key  (r) = Recorded By, (t) = Taken By, (c) = Cosigned By    Initials Name Provider Type    Mary Saravia PTA Physical Therapy Assistant         Time Calculation:   PT Charges     Row Name 08/09/19 1641 08/09/19 0921          Time Calculation    Start Time  1308  -  0830  -     Stop Time  1350  -  0920  -     Time Calculation (min)  42 min  -  50 min  -     PT Received On  08/09/19  -YOAV  08/09/19  -YOAV     PT - Next Appointment  08/10/19  -YOAV  08/09/19  -        Time Calculation- PT    Total Timed Code Minutes- PT  20 minute(s)   -YOAV  20 minute(s)  -YOAV       User Key  (r) = Recorded By, (t) = Taken By, (c) = Cosigned By    Initials Name Provider Type    Mary Saravia PTA Physical Therapy Assistant        Therapy Charges for Today     Code Description Service Date Service Provider Modifiers Qty    43137778343 HC PT THER PROC EA 15 MIN 8/9/2019 Mary Morris, SHERLYN GP 1    37452819722 HC PT THER PROC GROUP 8/9/2019 Mary Morris, SHERLYN GP 1    45626107666 HC PT THER PROC EA 15 MIN 8/9/2019 Mary Morris, SHERLYN GP 1    30069969587 HC PT THER PROC GROUP 8/9/2019 Mary Morris, SHERLYN GP 1          PT G-Codes  Outcome Measure Options: AM-PAC 6 Clicks Basic Mobility (PT)  AM-PAC 6 Clicks Score (PT): 20    Mary Morris PTA  8/9/2019

## 2019-08-09 NOTE — PLAN OF CARE
Problem: Patient Care Overview  Goal: Plan of Care Review  Outcome: Ongoing (interventions implemented as appropriate)   08/09/19 4713   Coping/Psychosocial   Plan of Care Reviewed With patient   OTHER   Outcome Summary Pain under control with pain meds. vitals stable.Dressing dry and intact. voiding without difficulty. Neuro vacsular checks intact. Educated on blood pressure monitoring, verbalises understanding..   Plan of Care Review   Progress improving     Goal: Individualization and Mutuality  Outcome: Ongoing (interventions implemented as appropriate)    Goal: Discharge Needs Assessment  Outcome: Ongoing (interventions implemented as appropriate)    Goal: Interprofessional Rounds/Family Conf  Outcome: Ongoing (interventions implemented as appropriate)      Problem: Fall Risk (Adult)  Goal: Absence of Fall  Outcome: Ongoing (interventions implemented as appropriate)      Problem: Knee Arthroplasty (Total, Partial) (Adult)  Goal: Signs and Symptoms of Listed Potential Problems Will be Absent, Minimized or Managed (Knee Arthroplasty)  Outcome: Ongoing (interventions implemented as appropriate)

## 2019-08-09 NOTE — PROGRESS NOTES
"  Ortho POD 1    Patients Name:  Luis Diaz  YOB: 1945  Medical Records Number:  [id number]    No complaints except pain    /73 (BP Location: Right arm, Patient Position: Lying)   Pulse 72   Temp 97.4 °F (36.3 °C) (Oral)   Resp 16   Ht 171.5 cm (67.5\")   Wt 108 kg (238 lb 2 oz)   SpO2 92%   BMI 36.75 kg/m²     RLE:  NVI, calf nontender, sensation intact  No signs of DVT    Incision: clean, intact    Lab Results (last 24 hours)     Procedure Component Value Units Date/Time    POC Glucose Once [[id number]]  (Abnormal) Collected:  08/09/19 0613    Specimen:  Blood Updated:  08/09/19 0615     Glucose 177 mg/dL     Basic Metabolic Panel [[id number]]  (Abnormal) Collected:  08/09/19 0322    Specimen:  Blood Updated:  08/09/19 0434     Glucose 206 mg/dL      BUN 11 mg/dL      Creatinine 1.05 mg/dL      Sodium 140 mmol/L      Potassium 4.0 mmol/L      Chloride 101 mmol/L      CO2 26.4 mmol/L      Calcium 8.7 mg/dL      eGFR Non African Amer 69 mL/min/1.73      BUN/Creatinine Ratio 10.5     Anion Gap 12.6 mmol/L     Narrative:       GFR Normal >60  Chronic Kidney Disease <60  Kidney Failure <15    CBC (No Diff) [[id number]]  (Normal) Collected:  08/09/19 0322    Specimen:  Blood Updated:  08/09/19 0410     WBC 9.19 10*3/mm3      RBC 4.77 10*6/mm3      Hemoglobin 13.9 g/dL      Hematocrit 43.9 %      MCV 92.0 fL      MCH 29.1 pg      MCHC 31.7 g/dL      RDW 12.6 %      RDW-SD 42.6 fl      MPV 11.4 fL      Platelets 144 10*3/mm3     POC Glucose Once [[id number]]  (Abnormal) Collected:  08/08/19 2102    Specimen:  Blood Updated:  08/08/19 2104     Glucose 174 mg/dL     POC Glucose Once [[id number]]  (Abnormal) Collected:  08/08/19 1622    Specimen:  Blood Updated:  08/08/19 1626     Glucose 157 mg/dL     POC Glucose Once [[id number]]  (Abnormal) Collected:  08/08/19 0751    Specimen:  Blood Updated:  08/08/19 0753     Glucose 175 mg/dL           Xr Knee 1 Or 2 View Right    Result Date: " 8/8/2019  Narrative: 2-VIEW PORTABLE RIGHT KNEE  HISTORY: Knee replacement for osteoarthritis.  FINDINGS: The patient has had recent total knee replacement and the alignment appears satisfactory.  This report was finalized on 8/8/2019 2:32 PM by Dr. Min Gao M.D.      Xr Knee 1 Or 2 View Right    Result Date: 7/25/2019  Narrative: 2-VIEW RIGHT KNEE  HISTORY: Preop for knee surgery. Knee pain.  FINDINGS: There are severe degenerative changes throughout the knee with joint space narrowing and some articular irregularity, particularly involving the lateral compartment. There is an associated tiny joint effusion.  2-VIEW CHEST  HISTORY: Preop for knee surgery. Previous colon cancer.  FINDINGS: The lungs are moderately expanded and clear except for a calcified granuloma at the right base. The heart is top normal in size and there is no acute disease or change from 02/27/2014.  This report was finalized on 7/25/2019 2:34 PM by Dr. Min Gao M.D.      Xr Chest Pa & Lateral    Result Date: 7/25/2019  Narrative: 2-VIEW RIGHT KNEE  HISTORY: Preop for knee surgery. Knee pain.  FINDINGS: There are severe degenerative changes throughout the knee with joint space narrowing and some articular irregularity, particularly involving the lateral compartment. There is an associated tiny joint effusion.  2-VIEW CHEST  HISTORY: Preop for knee surgery. Previous colon cancer.  FINDINGS: The lungs are moderately expanded and clear except for a calcified granuloma at the right base. The heart is top normal in size and there is no acute disease or change from 02/27/2014.  This report was finalized on 7/25/2019 2:34 PM by Dr. Min Gao M.D.        S/p Right TKA  PT-WBAT with walker  ASA for DVT prophylaxis  Home with home health after PT tomorrow, if comfortable and mobilizing safely    Luis Roman MD  8/9/2019

## 2019-08-09 NOTE — THERAPY TREATMENT NOTE
Acute Care - Physical Therapy Treatment Note  [facility]     Patient Name: Luis Diaz  : 1945  MRN: [id number]  Today's Date: 2019             Admit Date: 2019    Visit Dx:  No diagnosis found.  Patient Active Problem List   Diagnosis   • Esophageal dysphagia   • Heartburn   • History of colon cancer   • OA (osteoarthritis) of knee       Therapy Treatment    Rehabilitation Treatment Summary     Row Name 1922             Treatment Time/Intention    Discipline  physical therapy assistant  -      Document Type  therapy note (daily note)  -      Subjective Information  complains of;fatigue;pain;swelling  -      Mode of Treatment  group therapy;physical therapy  -      Care Plan Review  patient/other agree to care plan  -      Care Plan Review, Other Participant(s)  spouse  -      Comment  steppage gt w/L LLE due to polio  -      Existing Precautions/Restrictions  fall  -      Recorded by [] Mary Morris, \[facility] [place] [facility]\"" 19 0936      Row Name 19             Sit-Stand Transfer    Sit-Stand [place] (Transfers)  minimum assist (75% patient effort);contact guard;verbal cues  -      Assistive Device (Sit-Stand Transfers)  walker, front-wheeled  -      Recorded by [] Mary Morris, \[facility] [place] [facility]\"" 19 1637      Row Name 19             Gait/Stairs Assessment/Training    [place] Level (Gait)  contact guard;verbal cues  -      Assistive Device (Gait)  walker, front-wheeled  -      Distance in Feet (Gait)  50  -      Deviations/Abnormal Patterns (Gait)  courtney decreased  -      Bilateral Gait Deviations  weight shift ability decreased;forward flexed posture  -      Negotiation (Stairs)  --  -      [place] Level (Stairs)  minimum assist (75% patient effort);contact guard;verbal cues;nonverbal cues (demo/gesture)  -      Handrail Location (Stairs)  both sides  -      Number of Steps (Stairs)  4  -      Ascending Technique  (Stairs)  step-to-step  -JM      Descending Technique (Stairs)  step-to-step  -JM      Stairs, Safety Issues  balance decreased during turns  -JM      Stairs, Impairments  strength decreased;impaired balance  -JM      Comment (Gait/Stairs)  pt has slight difficulty due to LLE having polio   -JM      Recorded by [JM] Mray Morris, PTA 08/09/19 1637      Row Name 08/09/19 0922             General ROM    GENERAL ROM COMMENTS  -6-100 R knee  -JM      Recorded by [JM] Mary Morris, PTA 08/09/19 1637      Row Name 08/09/19 0922             Therapeutic Exercise    Comment (Therapeutic Exercise)  TKR protocol x 15 reps  -JM      Recorded by [JM] Mayr Morris, SHERLYN 08/09/19 1637      Row Name 08/09/19 0922             Positioning and Restraints    Pre-Treatment Position  sitting in chair/recliner  -JM      In Chair  reclined;call light within reach;encouraged to call for assist;exit alarm on;with family/caregiver  -JM      Recorded by [JM] Mary Morris, [facility] 08/09/19 1637      Row Name 08/09/19 0922             Pain Scale: Numbers Pre/Post-Treatment    Pain Scale: Numbers, Post-Treatment  7/10  -JM      Pain Location - Side  Right  -JM      Pain Location  knee  -JM      Pain Intervention(s)  Medication (See MAR);Repositioned;Cold applied  -      Recorded by [] Mary Morris, [facility] 08/09/19 1637      Row Name                Wound 08/08/19 1157 Right knee Incision    Wound - Properties Group Date first assessed: 08/08/19 [RG] Time first assessed: 1157 [RG] Side: Right [RG] Location: knee [RG] Primary Wound Type: Incision [RG] Recorded by:  [RG] Annemarie Giles RN 08/08/19 1157      User Key  (r) = Recorded By, (t) = Taken By, (c) = Cosigned By    Initials Name Effective Dates Discipline    RG Annemarie Giles RN 06/16/16 -  Nurse    Mary Saravia, [facility] 03/07/18 -  PT          Wound 08/08/19 1157 Right knee Incision (Active)   Dressing Appearance dry;intact;no drainage 8/9/2019  3:55 PM   Closure  LEONARD 8/9/2019  3:55 PM   Base clean;dry 8/9/2019  3:55 PM   Dressing Care, Wound dressing changed;dressing applied 8/9/2019  8:22 AM           Physical Therapy Education     Title: PT OT SLP Therapies (Done)     Topic: Physical Therapy (Done)     Point: Mobility training (Done)     Learning Progress Summary           Patient Eager, E,TB,D, VU,NR by YOAV at 8/9/2019  4:38 PM    Acceptance, E,TB,D, VU,NR by PHU at 8/8/2019  4:24 PM   Family Eager, E,TB,D, VU,NR by YOAV at 8/9/2019  4:38 PM                   Point: Home exercise program (Done)     Learning Progress Summary           Patient Eager, E,TB,D, VU,NR by YOAV at 8/9/2019  4:38 PM    Acceptance, E,TB,D, VU,NR by PHU at 8/8/2019  4:24 PM   Family Eager, E,TB,D, VU,NR by YOAV at 8/9/2019  4:38 PM                   Point: Body mechanics (Done)     Learning Progress Summary           Patient Eager, E,TB,D, VU,NR by YOAV at 8/9/2019  4:38 PM    Acceptance, E,TB,D, VU,NR by PHU at 8/8/2019  4:24 PM   Family Eager, E,TB,D, VU,NR by YOAV at 8/9/2019  4:38 PM                   Point: Precautions (Done)     Learning Progress Summary           Patient Eager, E,TB,D, VU,NR by YOAV at 8/9/2019  4:38 PM    Acceptance, E,TB,D, VU,NR by PHU at 8/8/2019  4:24 PM   Family Eager, E,TB,D, VU,NR by YOAV at 8/9/2019  4:38 PM                               User Key     Initials Effective Dates Name Provider Type Discipline    YOAV 03/07/18 -  Mary Morris PTA Physical Therapy Assistant PT    PHU 07/05/19 -  Rose Garcia, PT Student PT Student PT                PT Recommendation and Plan     Plan of Care Reviewed With: patient, spouse  Progress: improving  Outcome Summary: incr amb dist, has slight steppage gt LLE due to minimal polio as a child per pt; plans DC home SAT  Outcome Measures     Row Name 08/09/19 1600             How much help from another person do you currently need...    Turning from your back to your side while in flat bed without using bedrails?  4  -JM      Moving from lying  on back to sitting on the side of a flat bed without bedrails?  4  -JM      Moving to and from a bed to a chair (including a wheelchair)?  3  -JM      Standing up from a chair using your arms (e.g., wheelchair, bedside chair)?  3  -JM      Climbing 3-5 steps with a railing?  3  -JM      To walk in hospital room?  3  -JM      AM-PAC 6 Clicks Score (PT)  20  -        User Key  (r) = Recorded By, (t) = Taken By, (c) = Cosigned By    Initials Name Provider Type    Mary Saravia PTA Physical Therapy Assistant         Time Calculation:   PT Charges     Row Name 08/09/19 0921             Time Calculation    Start Time  0830  -YOAV      Stop Time  0920  -YOAV      Time Calculation (min)  50 min  -YOAV      PT Received On  08/09/19  -YOAV      PT - Next Appointment  08/09/19  -YOAV         Time Calculation- PT    Total Timed Code Minutes- PT  20 minute(s)  -YOAV        User Key  (r) = Recorded By, (t) = Taken By, (c) = Cosigned By    Initials Name Provider Type    Mary Saravia PTA Physical Therapy Assistant        Therapy Charges for Today     Code Description Service Date Service Provider Modifiers Qty    68880488956 HC PT THER PROC EA 15 MIN 8/9/2019 Mary Morris PTA GP 1    79902988856 HC PT THER PROC GROUP 8/9/2019 Mary Morris PTA GP 1          PT G-Codes  Outcome Measure Options: AM-PAC 6 Clicks Basic Mobility (PT)  AM-PAC 6 Clicks Score (PT): 20    Mary Morris PTA  8/9/2019

## 2019-08-09 NOTE — PLAN OF CARE
Problem: Patient Care Overview  Goal: Plan of Care Review  Outcome: Ongoing (interventions implemented as appropriate)   08/09/19 5303   Coping/Psychosocial   Plan of Care Reviewed With patient;spouse   OTHER   Outcome Summary incr amb dist, has slight steppage gt LLE due to minimal polio as a child per pt; plans DC home SAT   Plan of Care Review   Progress improving

## 2019-08-09 NOTE — PLAN OF CARE
Problem: Patient Care Overview  Goal: Plan of Care Review  Outcome: Ongoing (interventions implemented as appropriate)   08/09/19 7410   Coping/Psychosocial   Plan of Care Reviewed With patient   OTHER   Outcome Summary VSS. Pain controlled with PO pain med. Dressing c/d/i. Up to chair and worked with PT today. Voiding without difficulty. Ambulates with assist and walker. Discussed medication for DM. Plans to d/c home with HH tomorrow.    Plan of Care Review   Progress improving       Problem: Fall Risk (Adult)  Goal: Absence of Fall  Outcome: Ongoing (interventions implemented as appropriate)      Problem: Knee Arthroplasty (Total, Partial) (Adult)  Goal: Signs and Symptoms of Listed Potential Problems Will be Absent, Minimized or Managed (Knee Arthroplasty)  Outcome: Ongoing (interventions implemented as appropriate)    Goal: Anesthesia/Sedation Recovery  Outcome: Ongoing (interventions implemented as appropriate)

## 2019-08-09 NOTE — PROGRESS NOTES
Continued Stay Note  [facility]     Patient Name: Luis Diaz  MRN: [id number]  Today's Date: 8/9/2019    Admit Date: 8/8/2019    Discharge Plan     Row Name 08/09/19 1353       Plan    Plan  [facility]    Patient/Family in Agreement with Plan  yes    Plan Comments  Spoke with pt, verified correct information on facesheet and explained the role of CCP. Pt would like to d/c home with [facility], referral given to Eleanor with [facility] who states they are able to accept. Plan will be to d/c home with [facility] and family support. No other needs identified.         Discharge Codes    No documentation.             Tania Colon RN

## 2019-08-10 VITALS
SYSTOLIC BLOOD PRESSURE: 141 MMHG | HEIGHT: 68 IN | RESPIRATION RATE: 16 BRPM | TEMPERATURE: 98 F | BODY MASS INDEX: 36.09 KG/M2 | HEART RATE: 87 BPM | WEIGHT: 238.13 LBS | DIASTOLIC BLOOD PRESSURE: 83 MMHG | OXYGEN SATURATION: 95 %

## 2019-08-10 LAB
DEPRECATED RDW RBC AUTO: 42.3 FL (ref 37–54)
ERYTHROCYTE [DISTWIDTH] IN BLOOD BY AUTOMATED COUNT: 12.6 % (ref 12.3–15.4)
GLUCOSE BLDC GLUCOMTR-MCNC: 206 MG/DL (ref 70–130)
GLUCOSE BLDC GLUCOMTR-MCNC: 234 MG/DL (ref 70–130)
HCT VFR BLD AUTO: 43.1 % (ref 37.5–51)
HGB BLD-MCNC: 13.9 G/DL (ref 13–17.7)
MCH RBC QN AUTO: 29.5 PG (ref 26.6–33)
MCHC RBC AUTO-ENTMCNC: 32.3 G/DL (ref 31.5–35.7)
MCV RBC AUTO: 91.5 FL (ref 79–97)
PLATELET # BLD AUTO: NORMAL 10*3/MM3 (ref 140–450)
PMV BLD AUTO: 11 FL (ref 6–12)
RBC # BLD AUTO: 4.71 10*6/MM3 (ref 4.14–5.8)
WBC NRBC COR # BLD: 10.16 10*3/MM3 (ref 3.4–10.8)

## 2019-08-10 PROCEDURE — 85027 COMPLETE CBC AUTOMATED: CPT | Performed by: ORTHOPAEDIC SURGERY

## 2019-08-10 PROCEDURE — 97150 GROUP THERAPEUTIC PROCEDURES: CPT

## 2019-08-10 PROCEDURE — 97110 THERAPEUTIC EXERCISES: CPT

## 2019-08-10 PROCEDURE — 82962 GLUCOSE BLOOD TEST: CPT

## 2019-08-10 RX ORDER — OXYCODONE HYDROCHLORIDE AND ACETAMINOPHEN 5; 325 MG/1; MG/1
1-2 TABLET ORAL EVERY 4 HOURS PRN
Qty: 84 TABLET | Refills: 0 | Status: SHIPPED | OUTPATIENT
Start: 2019-08-10 | End: 2020-05-05

## 2019-08-10 RX ORDER — PSEUDOEPHEDRINE HCL 30 MG
100 TABLET ORAL 2 TIMES DAILY PRN
Qty: 30 EACH | Refills: 1 | Status: SHIPPED | OUTPATIENT
Start: 2019-08-10 | End: 2020-05-05

## 2019-08-10 RX ORDER — ASPIRIN 81 MG/1
81 TABLET ORAL 2 TIMES DAILY WITH MEALS
Qty: 60 TABLET | Refills: 0 | Status: SHIPPED | OUTPATIENT
Start: 2019-08-10 | End: 2022-10-03

## 2019-08-10 RX ADMIN — MUPIROCIN 1 APPLICATION: 20 OINTMENT TOPICAL at 08:25

## 2019-08-10 RX ADMIN — SODIUM CHLORIDE, PRESERVATIVE FREE 3 ML: 5 INJECTION INTRAVENOUS at 08:25

## 2019-08-10 RX ADMIN — FERROUS SULFATE TAB 325 MG (65 MG ELEMENTAL FE) 325 MG: 325 (65 FE) TAB at 08:22

## 2019-08-10 RX ADMIN — LINAGLIPTIN 5 MG: 5 TABLET, FILM COATED ORAL at 08:22

## 2019-08-10 RX ADMIN — OXYCODONE HYDROCHLORIDE AND ACETAMINOPHEN 2 TABLET: 5; 325 TABLET ORAL at 11:12

## 2019-08-10 RX ADMIN — ASPIRIN 81 MG: 81 TABLET, COATED ORAL at 08:22

## 2019-08-10 RX ADMIN — OXYCODONE HYDROCHLORIDE AND ACETAMINOPHEN 1 TABLET: 5; 325 TABLET ORAL at 07:30

## 2019-08-10 RX ADMIN — OXYCODONE HYDROCHLORIDE AND ACETAMINOPHEN 1 TABLET: 5; 325 TABLET ORAL at 02:33

## 2019-08-10 RX ADMIN — LISINOPRIL 10 MG: 10 TABLET ORAL at 08:22

## 2019-08-10 NOTE — PROGRESS NOTES
"  Ortho POD 2    Patient Name:  Luis Diaz  YOB: 1945  Medical Records Number:  [id number]    No complaints except pain    /83 (BP Location: Right arm, Patient Position: Lying)   Pulse 87   Temp 98.4 °F (36.9 °C) (Oral)   Resp 16   Ht 171.5 cm (67.5\")   Wt 108 kg (238 lb 2 oz)   SpO2 95%   BMI 36.75 kg/m²     RLE:  NVI, calf nontender, sensation intact  No signs of DVT    Incision: clean, no infection    Lab Results (last 24 hours)     Procedure Component Value Units Date/Time    POC Glucose Once [[id number]]  (Abnormal) Collected:  08/10/19 0602    Specimen:  Blood Updated:  08/10/19 0604     Glucose 206 mg/dL     CBC (No Diff) [[id number]] Collected:  08/10/19 0326    Specimen:  Blood Updated:  08/10/19 0528     WBC 10.16 10*3/mm3      RBC 4.71 10*6/mm3      Hemoglobin 13.9 g/dL      Hematocrit 43.1 %      MCV 91.5 fL      MCH 29.5 pg      MCHC 32.3 g/dL      RDW 12.6 %      RDW-SD 42.3 fl      MPV 11.0 fL      Platelets -- 10*3/mm3      Comment: Unable to determine platelet count. Platelet clumping present. Recollect specimen in EDTA and sodium citrate tubes.              S/p Right TKA  WBAT with walker  ASA for DVT prophylaxis  I certify that this patient requires inpatient admission for greater than 2 midnights due to fall risk with history of frequent falls  Home with home health today after PT, if comfortable and mobilizing safely    Luis Roman MD  8/10/2019  "

## 2019-08-10 NOTE — THERAPY TREATMENT NOTE
Acute Care - Physical Therapy Treatment Note  [facility]     Patient Name: Luis Diaz  : 1945  MRN: [id number]  Today's Date: 8/10/2019             Admit Date: 2019    Visit Dx:  No diagnosis found.  Patient Active Problem List   Diagnosis   • Esophageal dysphagia   • Heartburn   • History of colon cancer   • OA (osteoarthritis) of knee       Therapy Treatment    Rehabilitation Treatment Summary     Row Name 08/10/19 0907             Treatment Time/Intention    Discipline  physical therapy assistant  -      Document Type  therapy note (daily note);discharge treatment  -2      Subjective Information  complains of;pain;fatigue  -2      Mode of Treatment  group therapy;physical therapy  -      Care Plan Review  patient/other agree to care plan  -2      Care Plan Review, Other Participant(s)  spouse  -2      Existing Precautions/Restrictions  fall  -      Recorded by [] Mary Morris PTA 08/10/19 0917  [JM2] Mary Morris PTA 08/10/19 1743      Row Name 08/10/19 0907             Sit-Stand Transfer    Sit-Stand [place] (Transfers)  minimum assist (75% patient effort);contact guard;verbal cues  -      Assistive Device (Sit-Stand Transfers)  walker, front-wheeled  -      Recorded by [] Mary Morris PTA 08/10/19 0917      Row Name 08/10/19 0907             Gait/Stairs Assessment/Training    [place] Level (Gait)  contact guard;verbal cues  -      Assistive Device (Gait)  walker, front-wheeled  -      Distance in Feet (Gait)  100  -2      Deviations/Abnormal Patterns (Gait)  courtney decreased  -      Bilateral Gait Deviations  forward flexed posture  -2      Comment (Gait/Stairs)  improved posture w/cues and dist  -2      Recorded by [JM] Mary Morris PTA 08/10/19 0917  [JM2] Mary Morris PTA 08/10/19 1745      Row Name 08/10/19 0907             General ROM    GENERAL ROM COMMENTS  -5-95  -      Recorded by [] Mary Morris, PTA  08/10/19 1743      Row Name 08/10/19 0907             Therapeutic Exercise    Comment (Therapeutic Exercise)  TKR protocol x 25 reps  -JM      Recorded by [JM] Mary Morris [facility] 08/10/19 1743      Row Name 08/10/19 0907             Positioning and Restraints    Pre-Treatment Position  sitting in chair/recliner  -JM      In Chair  reclined;call light within reach;encouraged to call for assist;with family/caregiver  -JM      Recorded by [JM] Mary Morris PTA 08/10/19 1743      Row Name 08/10/19 0907             Pain Scale: Numbers Pre/Post-Treatment    Pain Scale: Numbers, Post-Treatment  8/10  -      Pain Location - Side  Right  -JM2      Pain Location  knee  -2      Pain Intervention(s)  Medication (See MAR);Repositioned;Elevated nsg to get new ice packs  -JM      Recorded by [JM] Mary Morris PTA 08/10/19 1743  [JM2] Mary Morris PTA 08/10/19 0917      Row Name                Wound 08/08/19 1157 Right knee Incision    Wound - Properties Group Date first assessed: 08/08/19 [RG] Time first assessed: 1157 [RG] Side: Right [RG] Location: knee [RG] Primary Wound Type: Incision [RG] Recorded by:  [] Annemarie Giles RN 08/08/19 1157      User Key  (r) = Recorded By, (t) = Taken By, (c) = Cosigned By    Initials Name Effective Dates Discipline    Annemarie Larios RN 06/16/16 -  Nurse    Mary Saravia [facility] 03/07/18 -  PT          Wound 08/08/19 1157 Right knee Incision (Active)   Dressing Appearance dry;intact;no drainage 8/10/2019  8:22 AM   Closure [place] 8/10/2019  8:22 AM   Base clean;dry 8/10/2019  8:22 AM   Drainage Amount none 8/10/2019  8:22 AM   Dressing Care, Wound dressing changed;border dressing 8/10/2019  8:22 AM           Physical Therapy Education     Title: PT OT SLP Therapies (Done)     Topic: Physical Therapy (Done)     Point: Mobility training (Done)     Learning Progress Summary           Patient Eager, E,TB,D, VU,NR by YOAV at 8/9/2019  4:38 PM    Acceptance,  E,TB,D, VU,NR by PHU at 8/8/2019  4:24 PM   Family Eager, E,TB,D, VU,NR by YOAV at 8/9/2019  4:38 PM                   Point: Home exercise program (Done)     Learning Progress Summary           Patient Eager, E,TB,D, VU,NR by YOAV at 8/9/2019  4:38 PM    Acceptance, E,TB,D, VU,NR by PHU at 8/8/2019  4:24 PM   Family Eager, E,TB,D, VU,NR by YOAV at 8/9/2019  4:38 PM                   Point: Body mechanics (Done)     Learning Progress Summary           Patient Eager, E,TB,D, VU,NR by YOAV at 8/9/2019  4:38 PM    Acceptance, E,TB,D, VU,NR by PHU at 8/8/2019  4:24 PM   Family Eager, E,TB,D, VU,NR by YOAV at 8/9/2019  4:38 PM                   Point: Precautions (Done)     Learning Progress Summary           Patient Eager, E,TB,D, VU,NR by YOAV at 8/9/2019  4:38 PM    Acceptance, E,TB,D, VU,NR by PHU at 8/8/2019  4:24 PM   Family Eager, E,TB,D, VU,NR by YOAV at 8/9/2019  4:38 PM                               User Key     Initials Effective Dates Name Provider Type Discipline     03/07/18 -  Mary Morris, PTA Physical Therapy Assistant PT    PHU 07/05/19 -  Vu Garcia., PT Student PT Student PT                PT Recommendation and Plan     Plan of Care Reviewed With: patient, spouse  Progress: improving  Outcome Summary: incr amb dist, has slight steppage gt LLE due to minimal polio as a child per pt; plans DC home SAT  Outcome Measures     Row Name 08/09/19 1600             How much help from another person do you currently need...    Turning from your back to your side while in flat bed without using bedrails?  4  -JM      Moving from lying on back to sitting on the side of a flat bed without bedrails?  4  -JM      Moving to and from a bed to a chair (including a wheelchair)?  3  -JM      Standing up from a chair using your arms (e.g., wheelchair, bedside chair)?  3  -JM      Climbing 3-5 steps with a railing?  3  -JM      To walk in hospital room?  3  -JM      AM-PAC 6 Clicks Score (PT)  20  -JM        User Key  (r) =  Recorded By, (t) = Taken By, (c) = Cosigned By    Initials Name Provider Type    Mary Saravia PTA Physical Therapy Assistant         Time Calculation:   PT Charges     Row Name 08/10/19 1238             Time Calculation    Start Time  0830  -      Stop Time  0925  -      Time Calculation (min)  55 min  -      PT Received On  08/10/19  -         Time Calculation- PT    Total Timed Code Minutes- PT  25 minute(s)  -        User Key  (r) = Recorded By, (t) = Taken By, (c) = Cosigned By    Initials Name Provider Type    Mary Saravia PTA Physical Therapy Assistant        Therapy Charges for Today     Code Description Service Date Service Provider Modifiers Qty    10321606359 HC PT THER PROC EA 15 MIN 8/9/2019 Mary Morris, SHERLYN GP 1    78015002068 HC PT THER PROC GROUP 8/9/2019 Mary Morris, SHERLYN GP 1    21664777640 HC PT THER PROC EA 15 MIN 8/9/2019 Mary Morris, SHERLYN GP 1    37973798035 HC PT THER PROC GROUP 8/9/2019 Mary Morris, SHERLYN GP 1    80948123269 HC PT THER PROC EA 15 MIN 8/10/2019 Mary Morris, SHERLYN GP 2    53120803961 HC PT THER PROC GROUP 8/10/2019 Mary Morris, SHERLYN GP 1          PT G-Codes  Outcome Measure Options: AM-PAC 6 Clicks Basic Mobility (PT)  AM-PAC 6 Clicks Score (PT): 20    Mary Morris PTA  8/10/2019

## 2019-08-10 NOTE — DISCHARGE SUMMARY
Orthopaedic Surgery Discharge Summary    Patient Name:  Luis Diaz  YOB: 1945  Medical Records Number:  [id number]    Date of Admission:  8/8/2019  Date of Discharge:  8/10/2019    Primary Discharge Diagnosis:  OA (osteoarthritis) of knee [M17.10]    Secondary Discharge Diagnosis:    Problem List Items Addressed This Visit     None          Procedures Performed:  Right Total Knee Arthroplasty      Hospital Course:    Luis Diaz is a 73 y.o.  male who underwent successful right tka on 8/8/2019.  Luis Diaz was started on Aspirin 81 mg po twice daily immediately post-operatively for DVT prophylaxis.  On post-op day 1 the patients dressing was changed and their incision was clean, with no signs of infection and their calf was soft, with no signs of DVT.  The patient progressed well with physical therapy and the patients hemoglobin remained stable. On post-operative day 2 the patient was felt ready for discharge.     Total Knee Joint Replacement Discharge Instructions:    I. ACTIVITIES:  1. Exercises:  ? Complete exercise program as taught by the hospital physical therapist 2 times per day  ? Exercise program will be advanced by the physical therapist  ? During the day be up ambulating every 2 hours (while awake) for short distances  ? Complete the ankle pump exercises at least 10 times per hour (while awake)  ? Elevate legs most of the day the first week post operatively and thereafter elevate legs when in bed and for at least 30 minutes during the day. Caution must be taken to avoid pillow placement under the bend of the knee as this can led to flexion contractures of the knee.  ? Use cold packs 20-30 minutes approximately 5 times per day. This should be done before and after completing your exercises and at any time you are experiencing pain/ stiffness in your operative extremity.      2. Activities of Daily Living:  ? No tub baths, hot tubs, or swimming pools for 4 weeks  ? May  shower and let water run over the incision on post-operative day #5 if no drainage. Do not scrub or rub the incision. Simply let the water run over the incision and pat dry.    II. Restrictions  ? Do not cross legs or kneel  ? Your surgeon will discuss with you when you will be able to drive again.  ? Weight bearing is as tolerated  ? First week stay inside on even terrain. May go up and down stairs one stair at a time utilizing the hand rail  ? After one week, you may venture outside.    III. Precautions:  ? Everyone that comes near you should wash their hands  ? No elective dental, genital-urinary, or colon procedures or surgical procedures for 12 weeks after surgery unless absolutely necessary.  ?  If dental work or surgical procedure is deemed absolutely necessary, you will need to contact your surgeon as you will need to take antibiotics 1 hour prior to any dental work (including teeth cleanings).  ? Please discuss with your surgeon prophylactic antibiotics as the length of time this intervention will be necessary for you varies with each patient’s health history and situation.  ? Avoid sick people. If you must be around someone who is ill, they should wear a mask.  ? Avoid visits to the Emergency Room or Urgent Care unless you are having a life threatening event.   ? If ordered stockings are to be placed on in the morning and removed at night. Monitor the stockings to ensure that any swelling is not causing the stockings to become too tight. In this case, remove stockings immediately.    IV. INCISION CARE:  ? Wash your hands prior to dressing changes  ? Change the dressing as needed to keep incision clean and dry. Utilize dry gauze and paper tape. Avoid touching the side of the gauze that goes against the incision with your hands.  ? No creams or ointments to the incision  ? May remove dressing once the incision is free of drainage  ? Do not touch or pick at the incision  ? Check incision every day and notify  surgeon immediately if any of the following signs or symptoms are noted:  o Increase in redness  o Increase in swelling around the incision and of the entire extremity  o Increase in pain  o Drainage oozing from the incision  o Pulling apart of the edges of the incision  o Increase in overall body temperature (greater than 100.5 degrees)  ? Your surgeon will instruct you regarding suture or staple removal    V. Medications:   1. Anticoagulants: You will be discharged on an anticoagulant. This is a prophylactic medication that helps prevent blood clots during your post-operative period. The type and length of dosage varies based on your individual needs, procedure performed, and surgeon’s preference.  ? While taking the anticoagulant, you should avoid taking any additional aspirin, ibuprofen (Advil or Motrin), Aleve (Naprosyn) or other non-steroidal anti-inflammatory medications.   ? Notify surgeon immediately if any anisha bleeding is noted in the urine, stool, emesis, or from the nose or the incision. Blood in the stool will often appear as black rather than red. Blood in urine may appear as pink. Blood in emesis may appear as brown/black like coffee grounds.  ? You will need to apply pressure for longer periods of time to any cuts or abrasions to stop bleeding  ? Avoid alcohol while taking anticoagulants    2. Stool Softeners: You will be at greater risk of constipation after surgery due to being less mobile and the pain medications.   ? Take stool softeners as instructed by your surgeon while on pain medications. Over the counter Colace 100 mg 1-2 capsules twice daily.   ? If stools become too loose or too frequent, please decreases the dosage or stop the stool softener.  ? If constipation occurs despite use of stool softeners, you are to continue the stool softeners and add a laxative (Milk of Magnesia 1 ounce daily as needed)  ? Drink plenty of fluids, and eat fruits and vegetables during your recovery  time    3. Pain Medications utilized after surgery are narcotics and the law requires that the following information be given to all patients that are prescribed narcotics:  ? CLASSIFICATION: Pain medications are called Opioids and are narcotics  ? LEGALITIES: It is illegal to share narcotics with others and to drive within 24 hours of taking narcotics  ? POTENTIAL SIDE EFFECTS: Potential side effects of opioids include: nausea, vomiting, itching, dizziness, drowsiness, dry mouth, constipation, and difficulty urinating.  ? POTENTIAL ADVERSE EFFECTS:   o Opioid tolerance can develop with use of pain medications and this simply means that it requires more and more of the medication to control pain; however, this is seen more in patients that use opioids for longer periods of time.  o Opioid dependence can develop with use of Opioids and this simply means that to stop the medication can cause withdrawal symptoms; however, this is seen with patients that use Opioids for longer periods of time.  o Opioid addiction can develop with use of Opioids and the incidence of this is very unlikely in patients who take the medications as ordered and stop the medications as instructed.  o Opioid overdose can be dangerous, but is unlikely when the medication is taken as ordered and stopped when ordered. It is important not to mix opioids with alcohol or with and type of sedative such as Benadryl as this can lead to over sedation and respiratory difficulty.  ? DOSAGE:   o Pain medications will need to be taken consistently for the first week to decrease pain and promote adequate pain relief and participation in physical therapy.  o After the initial surgical pain begins to resolve, you may begin to decrease the pain medication. By the end of 6-8 weeks, you should be off of pain medications.  o Refills will not be given by the office during evening hours, on weekends, or after 6-8 weeks post-op.  o To seek refills on pain medications  during the initial 6 week post-operative period, you must call the office 48 hours in advance to request the refill. The office will then notify you when to  the prescription. DO NOT wait until you are out of the medication to request a refill.    V. FOLLOW-UP VISITS:  ? You will need to follow up in the office with your surgeon in 3 weeks. Please call this number [phone] to schedule this appointment.  If you have any concerns or suspected complications prior to your follow up visit, please call your surgeons office. Do not wait until your appointment time if you suspect complications. These will need to be addressed in the office promptly.      Discharge Medications:     1) Percocet 5/325 mg  1-2 po q 4-6 hours for pain control  2)  Enteric Coated Aspirin 81 mg po twice daily for 4 weeks, then once daily for 2 weeks.    Luis Roman MD  8/10/2019    CC:Riddhi Ruiz MD:Luis Roman MD

## 2019-08-10 NOTE — PLAN OF CARE
Problem: Patient Care Overview  Goal: Plan of Care Review  Outcome: Ongoing (interventions implemented as appropriate)   08/10/19 0109   Coping/Psychosocial   Plan of Care Reviewed With patient   OTHER   Outcome Summary patient ambulating with assistance to bathroom and in hallway, pain controlled at this time, educated on b/p and glucose monitoring   Plan of Care Review   Progress improving     Goal: Individualization and Mutuality  Outcome: Ongoing (interventions implemented as appropriate)    Goal: Discharge Needs Assessment  Outcome: Ongoing (interventions implemented as appropriate)    Goal: Interprofessional Rounds/Family Conf  Outcome: Ongoing (interventions implemented as appropriate)      Problem: Fall Risk (Adult)  Goal: Absence of Fall  Outcome: Ongoing (interventions implemented as appropriate)      Problem: Knee Arthroplasty (Total, Partial) (Adult)  Goal: Signs and Symptoms of Listed Potential Problems Will be Absent, Minimized or Managed (Knee Arthroplasty)  Outcome: Ongoing (interventions implemented as appropriate)    Goal: Anesthesia/Sedation Recovery  Outcome: Outcome(s) achieved Date Met: 08/10/19

## 2019-08-11 ENCOUNTER — READMISSION MANAGEMENT (OUTPATIENT)
Dept: CALL CENTER | Facility: HOSPITAL | Age: 74
End: 2019-08-11

## 2019-08-11 NOTE — OUTREACH NOTE
Prep Survey      Responses   Facility patient discharged from?  [facility]   Is patient eligible?  Yes   Discharge diagnosis  OA (osteoarthritis) of knee    Does the patient have one of the following disease processes/diagnoses(primary or secondary)?  Total Joint Replacement   Does the patient have Home health ordered?  Yes   What is the Home health agency?   [facility]    Is there a DME ordered?  Yes   What DME was ordered?  [employer] and sylvia dixon per CM note no company listed    Medication alerts for this patient  ASA    Prep survey completed?  Yes          Kristin Ruiz RN

## 2019-08-12 ENCOUNTER — READMISSION MANAGEMENT (OUTPATIENT)
Dept: CALL CENTER | Facility: HOSPITAL | Age: 74
End: 2019-08-12

## 2019-08-12 NOTE — OUTREACH NOTE
Total Joint Week 1 Survey      Responses   Facility patient discharged from?  [place]   Does the patient have one of the following disease processes/diagnoses(primary or secondary)?  Total Joint Replacement   Is there a successful TCM telephone encounter documented?  No   Joint surgery performed?  Knee   Week 1 attempt successful?  Yes   Call start time  1438   Call end time  1450   Has the patient been back in either the hospital or Emergency Department since discharge?  No   Discharge diagnosis  OA (osteoarthritis) of knee    Is patient permission given to speak with other caregiver?  Yes   List who call center can speak with  spouse   Is the patient taking all medications as directed (includes completed medication regime)?  Yes   Is the patient able to teach back alternate methods of pain control?  Ice, Knee-elevation/no pillow under knee, Shoulder-elevate above heart/ keep in sling as advised, Reposition, Correct alignment, Short, frequent activity, Other   Comments regarding appointments  Pt to see surgeion August 30, 2019.   Does the patient have a follow up appointment with their surgeon?  Yes   Has the patient kept scheduled appointments due by today?  N/A   What is the Home health agency?   [facility]    What DME was ordered?  [facility] and sylvia dixon per CM note no company listed    Psychosocial issues?  No   Has the patient began therapy sessions (either in the home or as an out patient)?  Yes   If the patient has started attending therapy, what post op day did they begin to attend (either in home or as an out patient)?    Pt began therapy on August 9, 2019.   Has the patient fallen since discharge?  No   Did the patient receive a copy of their discharge instructions?  Yes   What is the patient's perception of their functional status since discharge?  Improving   Is the patient able to teach back signs and symptoms of infection?  Temp >100.4 for 24h or longer, Incisional drainage, Blisters around incision,  Increased swelling or redness around incision (not associated with surgical edema), Severe discomfort or pain, Changes in mobility, Shortness of breath or chest pain   Is the patient able to teach back how to prevent infection?  No lotion or creams, No tub baths, hot tub or swimming, Eat well-balanced diet, Monitor blood sugar if diabetic, Shower only as directed by surgeon, Keep incision covered if drainage, Keep incision covered if pets in house, Wash hands before and after touching incision, Check incision daily   Is the patient able to teach back signs and symptoms of DVT?  Redness in calf, Area hot to touch, Shortness of breath or chest pain, Severe pain in calf, Swelling in calf   Is the patient able to teach back home safety measures?  Ability to shower, Accessibility to necessary areas in home, Modifications to reach items, Modifications with ADLs such as dressing, cooking, toileting   Did the patient implement home safety suggestions from pre-surgery classes if attended?  No   Is the patient/caregiver able to teach back the hierarchy of who to call/visit for symptoms/problems? PCP, Specialist, Home health nurse, Urgent Care, ED, 911  Yes   Additional teach back comments  Pt knowledgeable due to having past knee replacement.   Week 1 call completed?  Yes          Letty Fabian RN

## 2019-08-19 ENCOUNTER — READMISSION MANAGEMENT (OUTPATIENT)
Dept: CALL CENTER | Facility: HOSPITAL | Age: 74
End: 2019-08-19

## 2019-08-19 NOTE — OUTREACH NOTE
Total Joint Week 2 Survey      Responses   Facility patient discharged from?  [place]   Does the patient have one of the following disease processes/diagnoses(primary or secondary)?  Total Joint Replacement   Joint surgery performed?  Knee   Week 2 attempt successful?  Yes   Call start time  1648   Call end time  1650   Has the patient been back in either the hospital or Emergency Department since discharge?  No   Discharge diagnosis  OA (osteoarthritis) of knee    Does the patient have all medications related to this admission filled (includes all antibiotics, pain medications, etc.)  Yes   Is the patient taking all medications as directed (includes completed medication regime)?  Yes   Is the patient able to teach back alternate methods of pain control?  Ice, Knee-elevation/no pillow under knee, Shoulder-elevate above heart/ keep in sling as advised, Reposition, Correct alignment, Short, frequent activity, Other   Does the patient have a follow up appointment with their surgeon?  Yes   Has the patient kept scheduled appointments due by today?  Yes   What is the Home health agency?   [facility]    Has home health visited the patient within 72 hours of discharge?  Yes   What DME was ordered?  RW   Has all DME been delivered?  Yes   Psychosocial issues?  No   Has the patient began therapy sessions (either in the home or as an out patient)?  Yes   Does the patient have a wound vac in place?  N/A   Has the patient fallen since discharge?  No   Did the patient receive a copy of their discharge instructions?  Yes   Nursing interventions  Reviewed instructions with patient   What is the patient's perception of their functional status since discharge?  Improving   Is the patient able to teach back signs and symptoms of infection?  Temp >100.4 for 24h or longer, Incisional drainage, Blisters around incision, Increased swelling or redness around incision (not associated with surgical edema), Severe discomfort or pain, Changes in  mobility, Shortness of breath or chest pain   Is the patient able to teach back how to prevent infection?  No lotion or creams, No tub baths, hot tub or swimming, Eat well-balanced diet, Monitor blood sugar if diabetic, Shower only as directed by surgeon, Keep incision covered if drainage, Keep incision covered if pets in house, Wash hands before and after touching incision, Check incision daily   Is the patient able to teach back signs and symptoms of DVT?  Redness in calf, Area hot to touch, Shortness of breath or chest pain, Severe pain in calf, Swelling in calf   Is the patient able to teach back home safety measures?  Ability to shower, Accessibility to necessary areas in home, Modifications to reach items, Modifications with ADLs such as dressing, cooking, toileting   Did the patient implement home safety suggestions from pre-surgery classes if attended?  N/A   Is the patient/caregiver able to teach back the hierarchy of who to call/visit for symptoms/problems? PCP, Specialist, Home health nurse, Urgent Care, ED, 911  Yes   Week 2 call completed?  Yes          Jalen Orr RN

## 2019-09-03 ENCOUNTER — READMISSION MANAGEMENT (OUTPATIENT)
Dept: CALL CENTER | Facility: HOSPITAL | Age: 74
End: 2019-09-03

## 2019-09-03 NOTE — OUTREACH NOTE
Total Joint Month 1 Survey      Responses   Facility patient discharged from?  [place]   Does the patient have one of the following disease processes/diagnoses(primary or secondary)?  Total Joint Replacement   Month 1 attempt successful?  No   Unsuccessful attempts  Attempt 1          Jazmin Kelley RN

## 2019-09-06 ENCOUNTER — READMISSION MANAGEMENT (OUTPATIENT)
Dept: CALL CENTER | Facility: HOSPITAL | Age: 74
End: 2019-09-06

## 2019-09-06 NOTE — OUTREACH NOTE
Total Joint Month 1 Survey      Responses   Facility patient discharged from?  [place]   Does the patient have one of the following disease processes/diagnoses(primary or secondary)?  Total Joint Replacement   Joint surgery performed?  Knee   Month 1 attempt successful?  Yes   Call start time  1507   Call end time  1510   Discharge diagnosis  OA (osteoarthritis) of knee    Is the patient taking all medications as directed (includes completed medication regime)?  Yes   Has the patient kept scheduled appointments due by today?  Yes   Comments  Pt saw surgeon last friday.  He will see again on 9/27/19,   Is the patient still receiving Home Health Services?  N/A   Is the patient still attending therapy sessions(either in the home or as an outpatient)?  Yes [Pt states he goes 3x per week.]   Has the patient fallen since discharge?  No   What is the patient's perception of their functional status since discharge?  Improving   Is the patient/caregiver able to teach back the hierarchy of who to call/visit for symptoms/problems? PCP, Specialist, Home health nurse, Urgent Care, ED, 911  Yes   Month 1 call completed?  Yes   Wrap up additional comments  Pt reports he is doing great.  He denies any concerns at this time.           Guerda Velazquez, RN

## 2019-10-07 ENCOUNTER — APPOINTMENT (OUTPATIENT)
Dept: PREADMISSION TESTING | Facility: HOSPITAL | Age: 74
End: 2019-10-07

## 2019-10-07 VITALS — HEIGHT: 68 IN | BODY MASS INDEX: 35.49 KG/M2 | WEIGHT: 234.2 LBS

## 2019-10-07 LAB
ALBUMIN SERPL-MCNC: 4.3 G/DL (ref 3.5–5.2)
ALBUMIN/GLOB SERPL: 1.5 G/DL
ALP SERPL-CCNC: 84 U/L (ref 39–117)
ALT SERPL W P-5'-P-CCNC: 24 U/L (ref 1–41)
ANION GAP SERPL CALCULATED.3IONS-SCNC: 11.6 MMOL/L (ref 5–15)
AST SERPL-CCNC: 16 U/L (ref 1–40)
BASOPHILS # BLD AUTO: 0.05 10*3/MM3 (ref 0–0.2)
BASOPHILS NFR BLD AUTO: 0.5 % (ref 0–1.5)
BILIRUB SERPL-MCNC: 0.8 MG/DL (ref 0.2–1.2)
BILIRUB UR QL STRIP: NEGATIVE
BUN BLD-MCNC: 14 MG/DL (ref 8–23)
BUN/CREAT SERPL: 15.1 (ref 7–25)
CALCIUM SPEC-SCNC: 10 MG/DL (ref 8.6–10.5)
CHLORIDE SERPL-SCNC: 99 MMOL/L (ref 98–107)
CLARITY UR: CLEAR
CO2 SERPL-SCNC: 28.4 MMOL/L (ref 22–29)
COLOR UR: YELLOW
CREAT BLD-MCNC: 0.93 MG/DL (ref 0.76–1.27)
DEPRECATED RDW RBC AUTO: 42 FL (ref 37–54)
EOSINOPHIL # BLD AUTO: 0.2 10*3/MM3 (ref 0–0.4)
EOSINOPHIL NFR BLD AUTO: 2.2 % (ref 0.3–6.2)
ERYTHROCYTE [DISTWIDTH] IN BLOOD BY AUTOMATED COUNT: 13 % (ref 12.3–15.4)
GFR SERPL CREATININE-BSD FRML MDRD: 80 ML/MIN/1.73
GLOBULIN UR ELPH-MCNC: 2.8 GM/DL
GLUCOSE BLD-MCNC: 128 MG/DL (ref 65–99)
GLUCOSE UR STRIP-MCNC: NEGATIVE MG/DL
HCT VFR BLD AUTO: 41.9 % (ref 37.5–51)
HGB BLD-MCNC: 13.8 G/DL (ref 13–17.7)
HGB UR QL STRIP.AUTO: NEGATIVE
IMM GRANULOCYTES # BLD AUTO: 0.09 10*3/MM3 (ref 0–0.05)
IMM GRANULOCYTES NFR BLD AUTO: 1 % (ref 0–0.5)
KETONES UR QL STRIP: NEGATIVE
LEUKOCYTE ESTERASE UR QL STRIP.AUTO: NEGATIVE
LYMPHOCYTES # BLD AUTO: 1.42 10*3/MM3 (ref 0.7–3.1)
LYMPHOCYTES NFR BLD AUTO: 15.4 % (ref 19.6–45.3)
MCH RBC QN AUTO: 28.9 PG (ref 26.6–33)
MCHC RBC AUTO-ENTMCNC: 32.9 G/DL (ref 31.5–35.7)
MCV RBC AUTO: 87.8 FL (ref 79–97)
MONOCYTES # BLD AUTO: 1.09 10*3/MM3 (ref 0.1–0.9)
MONOCYTES NFR BLD AUTO: 11.8 % (ref 5–12)
NEUTROPHILS # BLD AUTO: 6.35 10*3/MM3 (ref 1.7–7)
NEUTROPHILS NFR BLD AUTO: 69.1 % (ref 42.7–76)
NITRITE UR QL STRIP: NEGATIVE
NRBC BLD AUTO-RTO: 0 /100 WBC (ref 0–0.2)
PH UR STRIP.AUTO: 5.5 [PH] (ref 5–8)
PLATELET # BLD AUTO: 163 10*3/MM3 (ref 140–450)
PMV BLD AUTO: 10.9 FL (ref 6–12)
POTASSIUM BLD-SCNC: 4.4 MMOL/L (ref 3.5–5.2)
PROT SERPL-MCNC: 7.1 G/DL (ref 6–8.5)
PROT UR QL STRIP: NEGATIVE
RBC # BLD AUTO: 4.77 10*6/MM3 (ref 4.14–5.8)
SODIUM BLD-SCNC: 139 MMOL/L (ref 136–145)
SP GR UR STRIP: 1.01 (ref 1–1.03)
UROBILINOGEN UR QL STRIP: NORMAL
WBC NRBC COR # BLD: 9.2 10*3/MM3 (ref 3.4–10.8)

## 2019-10-07 PROCEDURE — 80053 COMPREHEN METABOLIC PANEL: CPT | Performed by: ORTHOPAEDIC SURGERY

## 2019-10-07 PROCEDURE — 36415 COLL VENOUS BLD VENIPUNCTURE: CPT

## 2019-10-07 PROCEDURE — 85025 COMPLETE CBC W/AUTO DIFF WBC: CPT | Performed by: ORTHOPAEDIC SURGERY

## 2019-10-07 PROCEDURE — 81003 URINALYSIS AUTO W/O SCOPE: CPT | Performed by: ORTHOPAEDIC SURGERY

## 2019-10-07 RX ORDER — NAPROXEN SODIUM 220 MG
220 TABLET ORAL 2 TIMES DAILY PRN
Status: ON HOLD | COMMUNITY

## 2019-10-07 NOTE — DISCHARGE INSTRUCTIONS
Take the following medications the morning of surgery with a small sip of water: NONE        General Instructions:  • Do not eat solid food after midnight the night before surgery.  • You may drink clear liquids day of surgery but must stop at least one hour before your hospital arrival time.  • It is beneficial for you to have a clear drink that contains carbohydrates the day of surgery. 12 to 20 ounce bottle of G2 or Powerade Zero for diabetic patients.     Clear liquids are liquids you can see through.  Nothing red in color.     Plain water                               Sports drinks  Sodas                                   Gelatin (Jell-O)  Fruit juices without pulp such as white grape juice and apple juice  Popsicles that contain no fruit or yogurt  Tea or coffee (no cream or milk added)  Gatorade / Powerade  G2 / Powerade Zero    •    • If applicable bring your C-PAP/ BI-PAP machine.  • Bring any papers given to you in the doctor’s office.  • Wear clean comfortable clothes.  • Do not wear contact lenses, false eyelashes or make-up.  Bring a case for your glasses.   • Bring crutches or walker if applicable.  • Remove all piercings.  Leave jewelry and any other valuables at home.  • Hair extensions with metal clips must be removed prior to surgery.  • The Pre-Admission Testing nurse will instruct you to bring medications if unable to obtain an accurate list in Pre-Admission Testing.            Preventing a Surgical Site Infection:  • For 2 to 3 days before surgery, avoid shaving with a razor because the razor can irritate skin and make it easier to develop an infection.    • Any areas of open skin can increase the risk of a post-operative wound infection by allowing bacteria to enter and travel throughout the body.  Notify your surgeon if you have any skin wounds / rashes even if it is not near the expected surgical site.  The area will need assessed to determine if surgery should be delayed until it is  healed.  • The night prior to surgery sleep in a clean bed with clean clothing.  Do not allow pets to sleep with you.  • Shower on the morning of surgery using a fresh bar of anti-bacterial soap (such as Dial) and clean washcloth.  Dry with a clean towel and dress in clean clothing.  • Ask your surgeon if you will be receiving antibiotics prior to surgery.  • Make sure you, your family, and all healthcare providers clean their hands with soap and water or an alcohol based hand  before caring for you or your wound.    Day of surgery: 10/10/2019. MAIN OR. ARRIVAL TIME 1030AM  Your arrival time is approximately two hours before your scheduled surgery time.  Upon arrival, a Pre-op nurse and Anesthesiologist will review your health history, obtain vital signs, and answer questions you may have.  The only belongings needed at this time will be a list of your home medications and if applicable your C-PAP/BI-PAP machine.  If you are staying overnight your family can leave the rest of your belongings in the car and bring them to your room later.  A Pre-op nurse will start an IV and you may receive medication in preparation for surgery, including something to help you relax.  Your family will be able to see you in the Pre-op area.  Two visitors at a time will be allowed in the Pre-op room.  While you are in surgery your family should notify the waiting room  if they leave the waiting room area and provide a contact phone number.    Please be aware that surgery does come with discomfort.  We want to make every effort to control your discomfort so please discuss any uncontrolled symptoms with your nurse.   Your doctor will most likely have prescribed pain medications.      If you are going home after surgery you will receive individualized written care instructions before being discharged.  A responsible adult must drive you to and from the hospital on the day of your surgery and stay with you for 24  hours.        You have received a list of surgical assistants for your reference.  If you have any questions please call Pre-Admission Testing at [phone].  Deductibles and co-payments are collected on the day of service. Please be prepared to pay the required co-pay, deductible or deposit on the day of service as defined by your plan.

## 2019-10-08 ENCOUNTER — READMISSION MANAGEMENT (OUTPATIENT)
Dept: CALL CENTER | Facility: HOSPITAL | Age: 74
End: 2019-10-08

## 2019-10-08 NOTE — OUTREACH NOTE
Total Joint Month 2 Survey      Responses   Facility patient discharged from?  [facility]   Does the patient have one of the following disease processes/diagnoses(primary or secondary)?  Total Joint Replacement   Joint surgery performed?  Knee   Month 2 attempt successful?  Yes   Call start time  1753   Call end time  1755   Has the patient been back in either the hospital or Emergency Department since discharge?  Yes   If the patient has been back to hospital or Emergency Department list date and reason  OOT and broke a patellar tendon    Discharge diagnosis  OA (osteoarthritis) of knee    Is the patient taking all medications as directed (includes completed medication regime)?  Yes   Has the patient kept scheduled appointments due by today?  Yes   Is the patient still receiving Home Health Services?  N/A   Is the patient still attending therapy sessions(either in the home or as an outpatient)?  No   Has the patient fallen since discharge?  No   If the patient has fallen, were there any injuries?  No   What is the patient's perception of their functional status since discharge?  Improving   Month 2 Call Completed?  Yes   Wrap up additional comments  Broke his patellar tendon and will be having surgery soon.           Jazmin Kelley RN

## 2019-10-10 ENCOUNTER — ANESTHESIA EVENT (OUTPATIENT)
Dept: PERIOP | Facility: HOSPITAL | Age: 74
End: 2019-10-10

## 2019-10-10 ENCOUNTER — HOSPITAL ENCOUNTER (OUTPATIENT)
Facility: HOSPITAL | Age: 74
Setting detail: HOSPITAL OUTPATIENT SURGERY
Discharge: HOME | End: 2019-10-10
Attending: ORTHOPAEDIC SURGERY | Admitting: ORTHOPAEDIC SURGERY

## 2019-10-10 ENCOUNTER — ANESTHESIA (OUTPATIENT)
Dept: PERIOP | Facility: HOSPITAL | Age: 74
End: 2019-10-10

## 2019-10-10 VITALS
DIASTOLIC BLOOD PRESSURE: 87 MMHG | WEIGHT: 231.13 LBS | TEMPERATURE: 98.5 F | BODY MASS INDEX: 35.03 KG/M2 | HEART RATE: 74 BPM | SYSTOLIC BLOOD PRESSURE: 169 MMHG | RESPIRATION RATE: 16 BRPM | OXYGEN SATURATION: 100 % | HEIGHT: 68 IN

## 2019-10-10 LAB — GLUCOSE BLDC GLUCOMTR-MCNC: 151 MG/DL (ref 70–130)

## 2019-10-10 PROCEDURE — C1713 ANCHOR/SCREW BN/BN,TIS/BN: HCPCS | Performed by: ORTHOPAEDIC SURGERY

## 2019-10-10 PROCEDURE — 25010000002 HYDROMORPHONE PER 4 MG: Performed by: ANESTHESIOLOGY

## 2019-10-10 PROCEDURE — 25010000002 MIDAZOLAM PER 1 MG: Performed by: ANESTHESIOLOGY

## 2019-10-10 PROCEDURE — 25010000002 FENTANYL CITRATE (PF) 100 MCG/2ML SOLUTION: Performed by: NURSE ANESTHETIST, CERTIFIED REGISTERED

## 2019-10-10 PROCEDURE — 82962 GLUCOSE BLOOD TEST: CPT

## 2019-10-10 PROCEDURE — 25010000003 CEFAZOLIN IN DEXTROSE 2-4 GM/100ML-% SOLUTION: Performed by: NURSE ANESTHETIST, CERTIFIED REGISTERED

## 2019-10-10 PROCEDURE — 25010000002 PROPOFOL 10 MG/ML EMULSION: Performed by: NURSE ANESTHETIST, CERTIFIED REGISTERED

## 2019-10-10 PROCEDURE — 25010000002 HYDROMORPHONE PER 4 MG: Performed by: NURSE ANESTHETIST, CERTIFIED REGISTERED

## 2019-10-10 PROCEDURE — 25010000002 SUCCINYLCHOLINE PER 20 MG: Performed by: NURSE ANESTHETIST, CERTIFIED REGISTERED

## 2019-10-10 PROCEDURE — 25010000002 NEOSTIGMINE PER 0.5 MG: Performed by: ANESTHESIOLOGY

## 2019-10-10 PROCEDURE — 25010000002 ONDANSETRON PER 1 MG: Performed by: NURSE ANESTHETIST, CERTIFIED REGISTERED

## 2019-10-10 DEVICE — SUT NONABS MAXBRAID NMBR5 K60 38IN WHT/BLU: Type: IMPLANTABLE DEVICE | Site: KNEE | Status: FUNCTIONAL

## 2019-10-10 DEVICE — SCRW TI 5.0MM W ANCHR NO2: Type: IMPLANTABLE DEVICE | Site: KNEE | Status: FUNCTIONAL

## 2019-10-10 RX ORDER — NALOXONE HCL 0.4 MG/ML
0.2 VIAL (ML) INJECTION AS NEEDED
Status: DISCONTINUED | OUTPATIENT
Start: 2019-10-10 | End: 2019-10-10 | Stop reason: HOSPADM

## 2019-10-10 RX ORDER — SODIUM CHLORIDE 0.9 % (FLUSH) 0.9 %
3-10 SYRINGE (ML) INJECTION AS NEEDED
Status: DISCONTINUED | OUTPATIENT
Start: 2019-10-10 | End: 2019-10-10 | Stop reason: HOSPADM

## 2019-10-10 RX ORDER — PROMETHAZINE HYDROCHLORIDE 25 MG/ML
6.25 INJECTION, SOLUTION INTRAMUSCULAR; INTRAVENOUS
Status: DISCONTINUED | OUTPATIENT
Start: 2019-10-10 | End: 2019-10-10 | Stop reason: HOSPADM

## 2019-10-10 RX ORDER — DIPHENHYDRAMINE HCL 25 MG
25 CAPSULE ORAL
Status: DISCONTINUED | OUTPATIENT
Start: 2019-10-10 | End: 2019-10-10 | Stop reason: HOSPADM

## 2019-10-10 RX ORDER — CEFAZOLIN SODIUM 2 G/100ML
2 INJECTION, SOLUTION INTRAVENOUS ONCE
Status: DISCONTINUED | OUTPATIENT
Start: 2019-10-10 | End: 2019-10-10 | Stop reason: HOSPADM

## 2019-10-10 RX ORDER — SUCCINYLCHOLINE CHLORIDE 20 MG/ML
INJECTION INTRAMUSCULAR; INTRAVENOUS AS NEEDED
Status: DISCONTINUED | OUTPATIENT
Start: 2019-10-10 | End: 2019-10-10 | Stop reason: SURG

## 2019-10-10 RX ORDER — ROCURONIUM BROMIDE 10 MG/ML
INJECTION, SOLUTION INTRAVENOUS AS NEEDED
Status: DISCONTINUED | OUTPATIENT
Start: 2019-10-10 | End: 2019-10-10 | Stop reason: SURG

## 2019-10-10 RX ORDER — GLYCOPYRROLATE 0.2 MG/ML
INJECTION INTRAMUSCULAR; INTRAVENOUS AS NEEDED
Status: DISCONTINUED | OUTPATIENT
Start: 2019-10-10 | End: 2019-10-10 | Stop reason: SURG

## 2019-10-10 RX ORDER — TAMSULOSIN HYDROCHLORIDE 0.4 MG/1
1 CAPSULE ORAL DAILY
COMMUNITY
End: 2020-05-05

## 2019-10-10 RX ORDER — CELECOXIB 200 MG/1
200 CAPSULE ORAL
Status: DISCONTINUED | OUTPATIENT
Start: 2019-10-11 | End: 2019-10-10

## 2019-10-10 RX ORDER — SODIUM CHLORIDE 0.9 % (FLUSH) 0.9 %
3 SYRINGE (ML) INJECTION EVERY 12 HOURS SCHEDULED
Status: DISCONTINUED | OUTPATIENT
Start: 2019-10-10 | End: 2019-10-10 | Stop reason: HOSPADM

## 2019-10-10 RX ORDER — MIDAZOLAM HYDROCHLORIDE 1 MG/ML
1 INJECTION INTRAMUSCULAR; INTRAVENOUS
Status: DISCONTINUED | OUTPATIENT
Start: 2019-10-10 | End: 2019-10-10 | Stop reason: HOSPADM

## 2019-10-10 RX ORDER — SODIUM CHLORIDE, SODIUM LACTATE, POTASSIUM CHLORIDE, CALCIUM CHLORIDE 600; 310; 30; 20 MG/100ML; MG/100ML; MG/100ML; MG/100ML
9 INJECTION, SOLUTION INTRAVENOUS CONTINUOUS
Status: DISCONTINUED | OUTPATIENT
Start: 2019-10-10 | End: 2019-10-10 | Stop reason: HOSPADM

## 2019-10-10 RX ORDER — ACETAMINOPHEN 325 MG/1
650 TABLET ORAL ONCE AS NEEDED
Status: DISCONTINUED | OUTPATIENT
Start: 2019-10-10 | End: 2019-10-10 | Stop reason: HOSPADM

## 2019-10-10 RX ORDER — FAMOTIDINE 10 MG/ML
20 INJECTION, SOLUTION INTRAVENOUS ONCE
Status: COMPLETED | OUTPATIENT
Start: 2019-10-10 | End: 2019-10-10

## 2019-10-10 RX ORDER — HYDROCODONE BITARTRATE AND ACETAMINOPHEN 7.5; 325 MG/1; MG/1
1 TABLET ORAL ONCE AS NEEDED
Status: DISCONTINUED | OUTPATIENT
Start: 2019-10-10 | End: 2019-10-10 | Stop reason: HOSPADM

## 2019-10-10 RX ORDER — LABETALOL HYDROCHLORIDE 5 MG/ML
5 INJECTION, SOLUTION INTRAVENOUS
Status: DISCONTINUED | OUTPATIENT
Start: 2019-10-10 | End: 2019-10-10 | Stop reason: HOSPADM

## 2019-10-10 RX ORDER — FENTANYL CITRATE 50 UG/ML
50 INJECTION, SOLUTION INTRAMUSCULAR; INTRAVENOUS
Status: DISCONTINUED | OUTPATIENT
Start: 2019-10-10 | End: 2019-10-10 | Stop reason: HOSPADM

## 2019-10-10 RX ORDER — OXYCODONE AND ACETAMINOPHEN 7.5; 325 MG/1; MG/1
1 TABLET ORAL ONCE AS NEEDED
Status: COMPLETED | OUTPATIENT
Start: 2019-10-10 | End: 2019-10-10

## 2019-10-10 RX ORDER — CEFAZOLIN SODIUM 2 G/100ML
INJECTION, SOLUTION INTRAVENOUS AS NEEDED
Status: DISCONTINUED | OUTPATIENT
Start: 2019-10-10 | End: 2019-10-10 | Stop reason: SURG

## 2019-10-10 RX ORDER — HYDROMORPHONE HCL 110MG/55ML
PATIENT CONTROLLED ANALGESIA SYRINGE INTRAVENOUS AS NEEDED
Status: DISCONTINUED | OUTPATIENT
Start: 2019-10-10 | End: 2019-10-10 | Stop reason: SURG

## 2019-10-10 RX ORDER — FENTANYL CITRATE 50 UG/ML
INJECTION, SOLUTION INTRAMUSCULAR; INTRAVENOUS AS NEEDED
Status: DISCONTINUED | OUTPATIENT
Start: 2019-10-10 | End: 2019-10-10 | Stop reason: SURG

## 2019-10-10 RX ORDER — HYDRALAZINE HYDROCHLORIDE 20 MG/ML
5 INJECTION INTRAMUSCULAR; INTRAVENOUS
Status: DISCONTINUED | OUTPATIENT
Start: 2019-10-10 | End: 2019-10-10 | Stop reason: HOSPADM

## 2019-10-10 RX ORDER — CELECOXIB 200 MG/1
CAPSULE ORAL
Status: COMPLETED
Start: 2019-10-10 | End: 2019-10-10

## 2019-10-10 RX ORDER — PROMETHAZINE HYDROCHLORIDE 25 MG/ML
12.5 INJECTION, SOLUTION INTRAMUSCULAR; INTRAVENOUS ONCE AS NEEDED
Status: DISCONTINUED | OUTPATIENT
Start: 2019-10-10 | End: 2019-10-10 | Stop reason: HOSPADM

## 2019-10-10 RX ORDER — CELECOXIB 200 MG/1
400 CAPSULE ORAL ONCE
Status: COMPLETED | OUTPATIENT
Start: 2019-10-10 | End: 2019-10-10

## 2019-10-10 RX ORDER — MIDAZOLAM HYDROCHLORIDE 1 MG/ML
2 INJECTION INTRAMUSCULAR; INTRAVENOUS
Status: DISCONTINUED | OUTPATIENT
Start: 2019-10-10 | End: 2019-10-10 | Stop reason: HOSPADM

## 2019-10-10 RX ORDER — FLUMAZENIL 0.1 MG/ML
0.2 INJECTION INTRAVENOUS AS NEEDED
Status: DISCONTINUED | OUTPATIENT
Start: 2019-10-10 | End: 2019-10-10 | Stop reason: HOSPADM

## 2019-10-10 RX ORDER — ACETAMINOPHEN 500 MG
1000 TABLET ORAL EVERY 6 HOURS PRN
Status: DISCONTINUED | OUTPATIENT
Start: 2019-10-10 | End: 2019-10-10 | Stop reason: HOSPADM

## 2019-10-10 RX ORDER — PROPOFOL 10 MG/ML
VIAL (ML) INTRAVENOUS AS NEEDED
Status: DISCONTINUED | OUTPATIENT
Start: 2019-10-10 | End: 2019-10-10 | Stop reason: SURG

## 2019-10-10 RX ORDER — DIPHENHYDRAMINE HYDROCHLORIDE 50 MG/ML
12.5 INJECTION INTRAMUSCULAR; INTRAVENOUS
Status: DISCONTINUED | OUTPATIENT
Start: 2019-10-10 | End: 2019-10-10 | Stop reason: HOSPADM

## 2019-10-10 RX ORDER — HYDROMORPHONE HYDROCHLORIDE 1 MG/ML
0.5 INJECTION, SOLUTION INTRAMUSCULAR; INTRAVENOUS; SUBCUTANEOUS
Status: DISCONTINUED | OUTPATIENT
Start: 2019-10-10 | End: 2019-10-10 | Stop reason: HOSPADM

## 2019-10-10 RX ORDER — LIDOCAINE HYDROCHLORIDE 10 MG/ML
0.5 INJECTION, SOLUTION EPIDURAL; INFILTRATION; INTRACAUDAL; PERINEURAL ONCE AS NEEDED
Status: DISCONTINUED | OUTPATIENT
Start: 2019-10-10 | End: 2019-10-10 | Stop reason: HOSPADM

## 2019-10-10 RX ORDER — LIDOCAINE HYDROCHLORIDE 20 MG/ML
INJECTION, SOLUTION INFILTRATION; PERINEURAL AS NEEDED
Status: DISCONTINUED | OUTPATIENT
Start: 2019-10-10 | End: 2019-10-10 | Stop reason: SURG

## 2019-10-10 RX ORDER — EPHEDRINE SULFATE 50 MG/ML
5 INJECTION, SOLUTION INTRAVENOUS ONCE AS NEEDED
Status: DISCONTINUED | OUTPATIENT
Start: 2019-10-10 | End: 2019-10-10 | Stop reason: HOSPADM

## 2019-10-10 RX ORDER — PROMETHAZINE HYDROCHLORIDE 25 MG/1
25 SUPPOSITORY RECTAL ONCE AS NEEDED
Status: DISCONTINUED | OUTPATIENT
Start: 2019-10-10 | End: 2019-10-10 | Stop reason: HOSPADM

## 2019-10-10 RX ORDER — ONDANSETRON 2 MG/ML
4 INJECTION INTRAMUSCULAR; INTRAVENOUS ONCE AS NEEDED
Status: COMPLETED | OUTPATIENT
Start: 2019-10-10 | End: 2019-10-10

## 2019-10-10 RX ORDER — PROMETHAZINE HYDROCHLORIDE 25 MG/1
25 TABLET ORAL ONCE AS NEEDED
Status: DISCONTINUED | OUTPATIENT
Start: 2019-10-10 | End: 2019-10-10 | Stop reason: HOSPADM

## 2019-10-10 RX ADMIN — CELECOXIB 400 MG: 200 CAPSULE ORAL at 12:40

## 2019-10-10 RX ADMIN — FENTANYL CITRATE 50 MCG: 50 INJECTION, SOLUTION INTRAMUSCULAR; INTRAVENOUS at 14:34

## 2019-10-10 RX ADMIN — SUCCINYLCHOLINE CHLORIDE 120 MG: 20 INJECTION, SOLUTION INTRAMUSCULAR; INTRAVENOUS; PARENTERAL at 13:40

## 2019-10-10 RX ADMIN — CEFAZOLIN SODIUM 2 G: 2 INJECTION, SOLUTION INTRAVENOUS at 13:36

## 2019-10-10 RX ADMIN — ROCURONIUM BROMIDE 5 MG: 10 INJECTION INTRAVENOUS at 13:40

## 2019-10-10 RX ADMIN — CEFAZOLIN SODIUM 2 G: 2 INJECTION, SOLUTION INTRAVENOUS at 15:13

## 2019-10-10 RX ADMIN — HYDROMORPHONE HYDROCHLORIDE 0.5 MG: 2 INJECTION INTRAMUSCULAR; INTRAVENOUS; SUBCUTANEOUS at 15:30

## 2019-10-10 RX ADMIN — HYDROMORPHONE HYDROCHLORIDE 0.5 MG: 1 INJECTION, SOLUTION INTRAMUSCULAR; INTRAVENOUS; SUBCUTANEOUS at 15:58

## 2019-10-10 RX ADMIN — FENTANYL CITRATE 50 MCG: 50 INJECTION, SOLUTION INTRAMUSCULAR; INTRAVENOUS at 15:43

## 2019-10-10 RX ADMIN — FAMOTIDINE 20 MG: 10 INJECTION INTRAVENOUS at 12:36

## 2019-10-10 RX ADMIN — GLYCOPYRROLATE 0.5 MG: 0.2 INJECTION INTRAMUSCULAR; INTRAVENOUS at 15:12

## 2019-10-10 RX ADMIN — ACETAMINOPHEN 1000 MG: 500 TABLET, FILM COATED ORAL at 12:36

## 2019-10-10 RX ADMIN — ONDANSETRON 4 MG: 2 INJECTION INTRAMUSCULAR; INTRAVENOUS at 15:29

## 2019-10-10 RX ADMIN — LABETALOL 20 MG/4 ML (5 MG/ML) INTRAVENOUS SYRINGE 5 MG: at 17:05

## 2019-10-10 RX ADMIN — SODIUM CHLORIDE, POTASSIUM CHLORIDE, SODIUM LACTATE AND CALCIUM CHLORIDE 9 ML/HR: 600; 310; 30; 20 INJECTION, SOLUTION INTRAVENOUS at 12:36

## 2019-10-10 RX ADMIN — SODIUM CHLORIDE, POTASSIUM CHLORIDE, SODIUM LACTATE AND CALCIUM CHLORIDE: 600; 310; 30; 20 INJECTION, SOLUTION INTRAVENOUS at 14:32

## 2019-10-10 RX ADMIN — ROCURONIUM BROMIDE 45 MG: 10 INJECTION INTRAVENOUS at 13:50

## 2019-10-10 RX ADMIN — FENTANYL CITRATE 50 MCG: 50 INJECTION, SOLUTION INTRAMUSCULAR; INTRAVENOUS at 14:12

## 2019-10-10 RX ADMIN — FENTANYL CITRATE 50 MCG: 50 INJECTION, SOLUTION INTRAMUSCULAR; INTRAVENOUS at 13:40

## 2019-10-10 RX ADMIN — FENTANYL CITRATE 50 MCG: 50 INJECTION, SOLUTION INTRAMUSCULAR; INTRAVENOUS at 15:50

## 2019-10-10 RX ADMIN — LIDOCAINE HYDROCHLORIDE 100 MG: 20 INJECTION, SOLUTION INFILTRATION; PERINEURAL at 13:40

## 2019-10-10 RX ADMIN — HYDROMORPHONE HYDROCHLORIDE 0.5 MG: 1 INJECTION, SOLUTION INTRAMUSCULAR; INTRAVENOUS; SUBCUTANEOUS at 16:28

## 2019-10-10 RX ADMIN — MIDAZOLAM 1 MG: 1 INJECTION INTRAMUSCULAR; INTRAVENOUS at 12:36

## 2019-10-10 RX ADMIN — FENTANYL CITRATE 100 MCG: 50 INJECTION, SOLUTION INTRAMUSCULAR; INTRAVENOUS at 14:05

## 2019-10-10 RX ADMIN — PROPOFOL 120 MG: 10 INJECTION, EMULSION INTRAVENOUS at 13:40

## 2019-10-10 RX ADMIN — HYDROMORPHONE HYDROCHLORIDE 0.5 MG: 1 INJECTION, SOLUTION INTRAMUSCULAR; INTRAVENOUS; SUBCUTANEOUS at 16:08

## 2019-10-10 RX ADMIN — OXYCODONE HYDROCHLORIDE AND ACETAMINOPHEN 1 TABLET: 7.5; 325 TABLET ORAL at 16:45

## 2019-10-10 RX ADMIN — HYDROMORPHONE HYDROCHLORIDE 0.5 MG: 2 INJECTION INTRAMUSCULAR; INTRAVENOUS; SUBCUTANEOUS at 15:23

## 2019-10-10 RX ADMIN — NEOSTIGMINE METHYLSULFATE 2.5 MG: 1 INJECTION INTRAMUSCULAR; INTRAVENOUS; SUBCUTANEOUS at 15:12

## 2019-10-10 RX ADMIN — LABETALOL 20 MG/4 ML (5 MG/ML) INTRAVENOUS SYRINGE 5 MG: at 16:50

## 2019-10-10 NOTE — ANESTHESIA PREPROCEDURE EVALUATION
Anesthesia Evaluation     Patient summary reviewed and Nursing notes reviewed   no history of anesthetic complications:  NPO Solid Status: > 8 hours  NPO Liquid Status: > 2 hours           Airway   Mallampati: II  TM distance: >3 FB  Neck ROM: full  Large neck circumference  Comment: Large tongue  Dental - normal exam     Pulmonary    (+) sleep apnea on CPAP,   (-) asthma, shortness of breath, recent URI, not a smoker    ROS comment: Lung nodule  Cardiovascular   Exercise tolerance: good (4-7 METS)    (+) hypertension,   (-) dysrhythmias, angina, hyperlipidemia    ROS comment: Min ST elevation ant leads    Neuro/Psych- negative ROS  (-) seizures, CVA, dizziness/light headedness, syncope  GI/Hepatic/Renal/Endo    (+) obesity,   diabetes mellitus,     Musculoskeletal (-) negative ROS    Abdominal    Substance History - negative use  (-) alcohol use, drug use     OB/GYN negative ob/gyn ROS         Other   (+) blood dyscrasia (pseudothrombocytopenia)                       Anesthesia Plan    ASA 3     general     intravenous induction   Anesthetic plan, all risks, benefits, and alternatives have been provided, discussed and informed consent has been obtained with: patient.    Plan discussed with CRNA.

## 2019-10-10 NOTE — ANESTHESIA PROCEDURE NOTES
Airway  Urgency: elective    Date/Time: 10/10/2019 1:42 PM  Airway not difficult    General Information and Staff    Patient location during procedure: OR  Anesthesiologist: Lizett Ortiz MD  CRNA: Anca Abraham CRNA    Indications and Patient Condition  Indications for airway management: airway protection    Preoxygenated: yes  MILS not maintained throughout  Mask difficulty assessment: 1 - vent by mask    Final Airway Details  Final airway type: endotracheal airway      Successful airway: ETT  Cuffed: yes   Successful intubation technique: direct laryngoscopy  Facilitating devices/methods: intubating stylet  Endotracheal tube insertion site: oral  Blade: Morris  Blade size: 2  ETT size (mm): 7.5  Cormack-Lehane Classification: grade I - full view of glottis  Placement verified by: chest auscultation and capnometry   Cuff volume (mL): 10  Measured from: lips  Number of attempts at approach: 1  Assessment: lips, teeth, and gum same as pre-op and atraumatic intubation    Additional Comments  Ett placed easily, appears atraumatic, dentition intact

## 2019-10-10 NOTE — H&P
Orthopaedic Surgery History and Physical    Patient Name:  Luis Diaz  YOB: 1945  Age: 73 y.o.  Medical Records Number:  [id number]    Date of Admission:  No admission date for patient encounter.    Chief Complaint:  No admission diagnoses are documented for this encounter.    Luis Diaz is a 73 y.o. male who presents c/o severe right knee pain.  The pain started a week ago while on vacation.  He mis-stepped and fell backwards feeling a pop in his right knee which was replaced a month ago.  He was unable to bear weight, nor could he actively extend his right knee.  He wishes to proceed with right patellar tendon repair.    There were no vitals taken for this visit.    Past Medical History:    Past Medical History:   Diagnosis Date   • B12 deficiency    • BPH (benign prostatic hyperplasia)    • Bruises easily    • Cancer (CMS/HCC)     Colon   • Colostomy in place (CMS/HCC)     LEFT-COLON RESECTION FOR COLON CANCER   • Decreased platelet count (CMS/HCC)    • Diabetes mellitus (CMS/HCC)     TYPE 2   • Hypertension    • Lung nodule     MD HAS FOLLOWED FOR YEARS. NO CHANGES   • Polio    • PSA elevation     MD WATCHES.    • Pseudothrombocytopenia     HAS TO HAVE BLOOD DRAWN IN BLUE TUBE   • Right knee pain    • Seasonal allergies    • Shingles    • Sleep apnea     CPAP       Social History:    Social History     Socioeconomic History   • Marital status:      Spouse name: Not on file   • Number of children: Not on file   • Years of education: Not on file   • Highest education level: Not on file   Tobacco Use   • Smoking status: Never Smoker   • Smokeless tobacco: Never Used   Substance and Sexual Activity   • Alcohol use: No   • Drug use: No   • Sexual activity: Defer       Family History:    Family History   Problem Relation Age of Onset   • Emphysema Mother    • Lung cancer Father    • Malig Hyperthermia Neg Hx        Current Medications:  No current facility-administered medications  for this encounter.     Current Outpatient Medications:   •  alfuzosin (UROXATRAL) 10 MG 24 hr tablet, Take 10 mg by mouth Every Night., Disp: , Rfl:   •  aspirin 81 MG EC tablet, Take 1 tablet by mouth 2 (Two) Times a Day With Meals. (Patient taking differently: Take 81 mg by mouth 2 (Two) Times a Day With Meals. HELD FOR SURGERY), Disp: 60 tablet, Rfl: 0  •  atorvastatin (LIPITOR) 10 MG tablet, Take 10 mg by mouth Every Night., Disp: , Rfl:   •  docusate sodium 100 MG capsule, Take 100 mg by mouth 2 (Two) Times a Day As Needed for Constipation., Disp: 30 each, Rfl: 1  •  JANUVIA 100 MG tablet, Take 100 mg by mouth Daily., Disp: , Rfl:   •  lisinopril (PRINIVIL,ZESTRIL) 10 MG tablet, Take 10 mg by mouth daily., Disp: , Rfl:   •  Multiple Vitamins-Minerals (CENTRUM SILVER 50+MEN PO), Take  by mouth., Disp: , Rfl:   •  naproxen sodium (ALEVE) 220 MG tablet, Take 220 mg by mouth 2 (Two) Times a Day As Needed for Mild Pain . HELD FOR SURGERY, Disp: , Rfl:   •  Omega 3 1000 MG capsule, Take 1,000 mg by mouth 2 (Two) Times a Day., Disp: , Rfl:   •  oxyCODONE-acetaminophen (PERCOCET) 5-325 MG per tablet, Take 1-2 tablets by mouth Every 4 (Four) Hours As Needed (Pain)., Disp: 84 tablet, Rfl: 0  •  Vitamin D, Ergocalciferol, 2000 units capsule, Take 1 tablet by mouth Daily., Disp: , Rfl:     Allergies:    Allergies   Allergen Reactions   • Toradol [Ketorolac Tromethamine] Other (See Comments)     syncope       Review of Systems:   HEENT: Patient denies any headaches, vision changes, change in hearing, or tinnitus, Patient denies any rhinorrhea,epistaxis, sinus pain, mouth or dental problems, sore throat or hoarseness, or dysphagia  Pulmonary: Patient denies any cough, congestion, SOA, or wheezing  Cardiovascular: Patient denies any chest pain, dyspnea, palpitations, weakness, intolerance of exercise, varicosities, swelling of extremities, known murmur  Gastrointestinal:  Patient denies nausea, vomiting, diarrhea,  constipation, loss  of appetite, change in appetite, dysphagia, gas, heartburn, melena, change in bowel habits, use of laxatives or other drugs to alter the function of the gastrointestinal tract.  Genital/Urinary: Patient denies dysuria, change in color of urine, change in frequency of urination, pain with urgency, incontinence, retention, or nocturia.  Musculoskeletal: Patient denies increased warmth; redness; or swelling of joints; limitation of function; deformity; crepitation: pain in a joint or an extremity, the neck, or the back, especially with movement.  Neurological: Patient denies dizziness, tremor, ataxia, difficulty in speaking, change in speech, paresthesia, loss of sensation, seizures, syncope, changes in memory.  Endocrine system: Patient denies tremors, palpitations, intolerance of heat or cold, polyuria, polydipsia, polyphagia, diaphoresis, exophthalmos, or goiter.  Psychological: Patient denies thoughts/plans or harming self or other; depression,  insomnia, night terrors, apoorva, memory loss, disorientation.  Skin: Patient denies any bruising, rashes, discoloration, pruritus, wounds, ulcers, decubiti, changes in the hair or nails  Hematopoietic: Patient denies history of spontaneous or excessive bleeding, epistaxis, hematuria, melena, fatigue, enlarged or tender lymph nodes, pallor, history of anemia.      Physical Exam:   Awake, A&O x3, affect normal, no acute distress  Ambulating with a limp due to knee pain  Knee ROM not tested  Strength is 4/5 in the hamstring and calf, 0/5 in the quad  Cap refill is normal, Sensation intact    Card:  RR, HD Stable  Pulm:  Regular breathing, no S.O.A  Abd:  Soft, NT, ND    Lab Results (last 24 hours)     Procedure Component Value Units Date/Time    SCANNED - LABS [[id number]] Resulted:  10/10/19      Updated:  10/09/19 1056          No results found.    Assessment:  Right Knee Patellar Tendon Rupture s/p Right TKA    Plan:  Patient's has no active extension and  an obvious avulsion fracture/pateallar tendon rupture on x-ray and exam.  Radiographs reveal patella stacey and a bony fragment at the proximal end of the patellar tendon.  The risks of surgery, including, but not limited to, heart attack, stroke, dying, DVT, nerve injury, vascular injury, arthrofibrosis and infection were discussed.  The alternatives and benefits were also discussed.  All questions answered and the patient wishes to proceed with right knee patellar tendon repair.    Luis Moore PA-C  [facility]  [address] [place] [address]  [phone]    10/10/2019    CC: Riddhi Ruiz MD, Luis Roman MD

## 2019-10-10 NOTE — OP NOTE
Orthopaedic Surgery Operative Note    Patient Name:  Luis Diaz  YOB: 1945  Age: 73 y.o.  Medical Records Number:  [id number]    Date of Procedure:  10/10/2019    Pre-operative Diagnosis:  Right Patellar Tendon Rupture    Post-operative Diagnosis:  Right Patellar Tendon Rupture    Procedure Performed:  Right Patellar Tendon Repair    Anesthetic Type:  General    Estimated Blood Loss:  50cc's    Specimens: * No orders in the log *    No Complications      Indications for Procedure:  Luis Diaz is a 73 y.o. male suffering from an acute right patellar tendon rupture.  The risks, benefits and alternatives were discussed and the patient wishes to proceed with right patellar tendon repair.    Procedure Performed:    After informed consent was obtained and pre-operative IV Kefzol given, prior to tourniquet inflation, the patient was taken to the operating room and placed supine on the operating table.  After general anesthesia induced and 1 gm of Tranxemic Acid given, a well padded tourniquet was placed and the patient's right lower extremity was prepped with ChloraPrep and draped in a sterile fashion.    A midline incision was made overlying the right knee and we sharply dissected down to expose the parapatellar retinaculum. There was a significant rent in both the medial and lateral patella retinaculum associated with a patellar tendon rupture with a bony avulsion. We debrided the edges of the tendon and the distal patella.  We made a bony trough in the distal patella.  We ran two #5 Max Braid sutures through the patellar tendon.  We drilled three drill holes through the patella, taking care to avoid damage to the patella button from his previous total knee arthroplasty.  Using a suture passer, we passed the MaxBraid suture from the distal pole of the patella to the proximal pole.  We tied the suture over the proximal pole of the patella with the leg in full extension.  Prior to tying the  suture we copiously irrigated the knee.  We repaired the retinaculum with #1 Vicryl and 0 Ethibond.  We also used one Biomet suture anchor in the distal pole of the patella and weaved the suture through the patellar tendon to augment our MaxBraid repair.We closed the subcutaneous tissue with 2-0 vicryl and the skin was closed with staples.  We placed a sterile dressing of Xeroform, 4x4's, Kerlex and an Ace Wrap and placed the right leg in a knee immobilizer.  Patient was awakened from general anesthesia and taken to the recovery room in stable condition.    Luis Moore PA-C  [facility]  [address] [place] [address]  [phone]    10/10/2019

## 2019-10-10 NOTE — ANESTHESIA POSTPROCEDURE EVALUATION
Patient: Luis Diaz    Procedure Summary     Date:  10/10/19 Room / Location:  [facility] OR [address] [place] [address] MAIN OR    Anesthesia Start:  1336 Anesthesia Stop:  1530    Procedure:  PATELLA TENDON REPAIR (Right Knee) Diagnosis:      Surgeon:  Luis Roman MD Provider:  Lizett Ortiz MD    Anesthesia Type:  general ASA Status:  3          Anesthesia Type: general  Last vitals  BP   171/89 (10/10/19 1752)   Temp   36.9 °C (98.5 °F) (10/10/19 1730)   Pulse   73 (10/10/19 1752)   Resp   16 (10/10/19 1752)     SpO2   100 % (10/10/19 1752)     Post Anesthesia Care and Evaluation    Patient location during evaluation: PHASE II  Anesthetic complications: No anesthetic complications

## 2019-11-08 ENCOUNTER — READMISSION MANAGEMENT (OUTPATIENT)
Dept: CALL CENTER | Facility: HOSPITAL | Age: 74
End: 2019-11-08

## 2019-11-08 NOTE — OUTREACH NOTE
Total Joint Month 3 Survey      Responses   Facility patient discharged from?  [facility]   Does the patient have one of the following disease processes/diagnoses(primary or secondary)?  Total Joint Replacement   Joint surgery performed?  Knee   Month 3 attempt successful?  Yes   Call start time  1045   Call end time  1052   Has the patient been back in either the hospital or Emergency Department since discharge?  Yes   If the patient has been back to hospital or Emergency Department list date and reason  OOT and broke a patellar tendon - had a tendon repair surgery on 10/10/2019   Discharge diagnosis  OA (osteoarthritis) of knee    Is the patient taking all medications as directed (includes completed medication regime)?  Yes   Is the patient able to teach back alternate methods of pain control?  Ice, Knee-elevation/no pillow under knee, Reposition, Correct alignment, Short, frequent activity, Other   Has the patient kept scheduled appointments due by today?  Yes   Is the patient still attending therapy sessions(either in the home or as an outpatient)?  No   Has the patient fallen since discharge?  No   If the patient has fallen, were there any injuries?  No   What is the patient's perception of their functional status since discharge?  Improving   Is the patient able to teach back signs and symptoms of infection?  Temp >100.4 for 24h or longer, Incisional drainage, Blisters around incision, Increased swelling or redness around incision (not associated with surgical edema), Severe discomfort or pain, Changes in mobility, Shortness of breath or chest pain   Additional teach back comments  Pateint is still wearing immobilizer from tendon surgery. He is not released yet from last surgery for PT   Graduated  Yes   Did the patient feel the follow up calls were helpful during their recovery period?  Yes   Was the number of calls appropriate?  Yes   Wrap up additional comments  -          Jorge Melendez RN

## 2020-05-01 PROBLEM — G47.30 SLEEP APNEA: Status: ACTIVE | Noted: 2020-05-01

## 2020-05-01 PROBLEM — K94.13 COLOSTOMY AND ENTEROSTOMY MALFUNCTION: Status: ACTIVE | Noted: 2020-03-10

## 2020-05-01 PROBLEM — D69.6 THROMBOCYTOPENIA (HCC): Status: ACTIVE | Noted: 2020-05-01

## 2020-05-01 PROBLEM — K94.03 COLOSTOMY AND ENTEROSTOMY MALFUNCTION: Status: ACTIVE | Noted: 2020-03-10

## 2020-05-01 PROBLEM — E66.9 DIABETES MELLITUS TYPE 2 IN OBESE: Status: ACTIVE | Noted: 2019-08-07

## 2020-05-01 PROBLEM — A80.9 POLIOMYELITIS: Status: ACTIVE | Noted: 2020-05-01

## 2020-05-01 PROBLEM — Z93.3 STATUS POST COLOSTOMY: Status: ACTIVE | Noted: 2020-05-01

## 2020-05-01 PROBLEM — N40.0 BPH (BENIGN PROSTATIC HYPERPLASIA): Status: ACTIVE | Noted: 2020-05-01

## 2020-05-01 PROBLEM — S89.92XA INJURY OF LEG, LEFT: Status: ACTIVE | Noted: 2020-05-01

## 2020-05-01 PROBLEM — R91.1 PULMONARY NODULE: Status: ACTIVE | Noted: 2020-05-01

## 2020-05-01 PROBLEM — L03.116 CELLULITIS OF LEFT LOWER EXTREMITY: Status: ACTIVE | Noted: 2020-03-10

## 2020-05-01 PROBLEM — E11.69 DIABETES MELLITUS TYPE 2 IN OBESE: Status: ACTIVE | Noted: 2019-08-07

## 2020-05-01 RX ORDER — TRIAMCINOLONE ACETONIDE 5 MG/G
CREAM TOPICAL
COMMUNITY
Start: 2020-03-10 | End: 2020-05-05

## 2020-05-05 ENCOUNTER — OFFICE VISIT (OUTPATIENT)
Dept: SURGERY | Facility: CLINIC | Age: 75
End: 2020-05-05

## 2020-05-05 VITALS
OXYGEN SATURATION: 96 % | DIASTOLIC BLOOD PRESSURE: 86 MMHG | HEART RATE: 81 BPM | SYSTOLIC BLOOD PRESSURE: 160 MMHG | TEMPERATURE: 96.9 F | HEIGHT: 68 IN | WEIGHT: 250 LBS | BODY MASS INDEX: 37.89 KG/M2

## 2020-05-05 DIAGNOSIS — N36.0 PERINEAL SINUS: Primary | ICD-10-CM

## 2020-05-05 DIAGNOSIS — Z85.048 HISTORY OF RECTAL CANCER: ICD-10-CM

## 2020-05-05 PROCEDURE — 99204 OFFICE O/P NEW MOD 45 MIN: CPT | Performed by: COLON & RECTAL SURGERY

## 2020-05-05 RX ORDER — UBIDECARENONE 100 MG
100 CAPSULE ORAL DAILY
Status: ON HOLD | COMMUNITY

## 2020-05-05 NOTE — PROGRESS NOTES
Luis Diaz is a 74 y.o. male who is seen as a consult at the request of Riddhi Ruiz MD for drainage from perineum    HPI:    Pt c/o liquid reddish perineal drainage which he has had ever since APR with Dr. Hawley 1996 for rectal cancer.  The drainage comes and goes.  Recent episode of drainage began in January. 2020.  If he sees his urologist and has an exam, the drainage picks up.    He took prednisone and sulfa drug for LLE swelling from his PCP, and the perineum drainage was better while he was taking these.  However, the drainage has since resumed.  He has not had a CT in the past year.    He had endoscopic evaluation with Dr. Dhaliwal in 2018, which showed small sinus tract.    He did not do chemoradiation after surgery    Past Medical History:   Diagnosis Date   • Allergic dermatitis due to poison ivy 09/12/2012   • Arthritis    • At risk for transmitting infection to others 4/19/2016   • B12 deficiency    • Bone spur    • BPH (benign prostatic hyperplasia)     FOLLOWED BY DR. SALAS OGLESBY   • Bruises easily    • Cellulitis of left lower extremity 3/10/2020   • Closed displaced comminuted fracture of right patella 10/01/2019    SEEN AT [facility]   • Colon cancer (CMS/HCC) 1996    S/P APR WITH ILEOSTOMY   • Colostomy and enterostomy malfunction (CMS/HCC) 3/10/2020   • Colostomy in place (CMS/HCC)     LEFT-COLON RESECTION FOR COLON CANCER   • Diabetes mellitus (CMS/HCC)     TYPE 2, NIDDM   • Esophageal dysphagia    • Fatigue    • GERD (gastroesophageal reflux disease)    • Hyperlipidemia    • Hypertension    • Ileus (CMS/HCC) 10/03/2014    ADMITTED TO [facility]   • Influenza 01/01/2013   • Influenza A 01/03/2014   • Left leg swelling 02/2020   • OA (osteoarthritis)    • Onychomycosis 05/2016   • Osteopenia    • Polio 1951   • Prostatitis    • PSA elevation 10/2014    FOLLOWED BY DR. 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 NO CHANGES   • Rupture of right patellar tendon 10/2019    FOLLOWED BY DR. CLARISSA MAGAÑA   • Seasonal allergies    • Shingles 04/26/2016    SEEN AT [facility] ER   • Sleep apnea     CPAP   • Thrombocytopenia (CMS/HCC)    • Vitamin D deficiency        Past Surgical History:   Procedure Laterality Date   • CHOLECYSTECTOMY N/A 08/09/2012    LAPAROSCOPIC, DR. EDSON JIMENEZ AT [facility]   • COLON RESECTION N/A 04/1996    APR WITH COLOSTOMY, DR.DAVID TAYLOR   • COLONOSCOPY N/A     DR. LAI RAMIREZ   • ENDOSCOPY N/A 12/18/2018    NO ENDOSCOPIC ABNORMALITY WAS EVIDENT, DILATION OF ESOPHAGUS WITH LITTLEJOHN DILATOR TO 48FR AND 52FR, DR. ADELIA TOLEDO AT [facility]   • FOOT SURGERY Left 07/1958    RUBIO PROCEDURE FOR TENDON REPAIR, GREAT TOE   • KNEE ARTHROPLASTY Left     2013   • KNEE CARTILAGE SURGERY Left    • ORIF FINGER / THUMB FRACTURE Left 1985   • PATELLA TENDON REPAIR Right 10/10/2019    Procedure: PATELLA TENDON REPAIR;  Surgeon: Clarissa Magaña MD;  Location: [facility];  Service: Orthopedics   • SIGMOIDOSCOPY N/A 12/18/2018    NO CASTILLO'S POUCH WAS PRESENT, A SMALL SINUS TRACT WITH MATERIAL SUTURE AS A WICK WAS PRESENT, DR. ADELIA TOLEDO AT [facility]   • TONSILLECTOMY AND ADENOIDECTOMY Bilateral 1950    AT [facility]   • TOTAL HIP ARTHROPLASTY Right 03/06/2014    DR. CLARISSA MAGAÑA AT [facility]   • TOTAL KNEE ARTHROPLASTY Right 8/8/2019    Procedure: RIGHT TOTAL KNEE ARTHROPLASTY;  Surgeon: Clarissa Magaña MD;  Location: [facility];  Service: Orthopedics       Social History:   reports that he has never smoked. He has never used smokeless tobacco. He reports that he does not drink alcohol or use drugs.      Marriage status:     Family History   Problem Relation Age of Onset   • Emphysema Mother    • Heart disease Mother    • Diabetes Mother    • Heart failure Mother    • Heart attack Mother    • Arthritis Mother    • Osteoarthritis Mother    • Cancer Mother    • Lung cancer  Father    • Drug abuse Father    • Hypertension Father    • Alcohol abuse Father    • Cancer Father    • Diabetes Maternal Grandfather    • Diabetes Paternal Grandmother    • Cancer Brother    • Prostate cancer Brother    • Colon polyps Brother    • No Known Problems Daughter    • No Known Problems Daughter    • Malig Hyperthermia Neg Hx          Current Outpatient Medications:   •  alfuzosin (UROXATRAL) 10 MG 24 hr tablet, Take 10 mg by mouth Every Night., Disp: , Rfl:   •  aspirin 81 MG EC tablet, Take 1 tablet by mouth 2 (Two) Times a Day With Meals. (Patient taking differently: Take 81 mg by mouth 2 (Two) Times a Day With Meals. HELD FOR SURGERY), Disp: 60 tablet, Rfl: 0  •  atorvastatin (LIPITOR) 10 MG tablet, Take 10 mg by mouth Every Night., Disp: , Rfl:   •  coenzyme Q10 100 MG capsule, Take 100 mg by mouth Daily., Disp: , Rfl:   •  JANUVIA 100 MG tablet, Take 100 mg by mouth Daily., Disp: , Rfl:   •  lisinopril (PRINIVIL,ZESTRIL) 10 MG tablet, Take 10 mg by mouth daily., Disp: , Rfl:   •  Multiple Vitamins-Minerals (CENTRUM SILVER 50+MEN PO), Take  by mouth., Disp: , Rfl:   •  naproxen sodium (ALEVE) 220 MG tablet, Take 220 mg by mouth 2 (Two) Times a Day As Needed for Mild Pain . HELD FOR SURGERY, Disp: , Rfl:   •  Omega 3 1000 MG capsule, Take 1,000 mg by mouth 2 (Two) Times a Day., Disp: , Rfl:   •  Vitamin D, Ergocalciferol, 2000 units capsule, Take 1 tablet by mouth Daily., Disp: , Rfl:     Allergy  Toradol [ketorolac tromethamine]    Review of Systems   Constitution: Positive for weight gain. Negative for decreased appetite.   HENT: Negative for congestion, hearing loss and hoarse voice.    Eyes: Negative for blurred vision, discharge and visual disturbance.   Cardiovascular: Positive for leg swelling. Negative for chest pain and cyanosis.   Respiratory: Positive for snoring. Negative for cough, shortness of breath and sleep disturbances due to breathing.    Endocrine: Negative for cold intolerance  and heat intolerance.   Hematologic/Lymphatic: Bruises/bleeds easily.   Skin: Positive for itching. Negative for poor wound healing and skin cancer.   Musculoskeletal: Negative for arthritis, back pain, joint pain and joint swelling.   Gastrointestinal: Negative for abdominal pain, change in bowel habit, bowel incontinence and constipation.   Genitourinary: Positive for bladder incontinence. Negative for dysuria and hematuria.   Neurological: Negative for brief paralysis, excessive daytime sleepiness, dizziness, focal weakness, headaches, light-headedness and weakness.   Psychiatric/Behavioral: Negative for altered mental status and hallucinations. The patient does not have insomnia.    Allergic/Immunologic: Negative for HIV exposure and persistent infections.   All other systems reviewed and are negative.      Vitals:    05/05/20 0958   BP: 160/86   Pulse: 81   Temp: 96.9 °F (36.1 °C)   SpO2: 96%     Body mass index is 38.01 kg/m².    Physical Exam   Constitutional: He is oriented to person, place, and time. He appears well-developed and well-nourished. No distress.   HENT:   Head: Normocephalic and atraumatic.   Nose: Nose normal.   Mouth/Throat: Oropharynx is clear and moist.   Eyes: Pupils are equal, round, and reactive to light. Conjunctivae and EOM are normal.   Neck: Normal range of motion. No tracheal deviation present.   Pulmonary/Chest: Effort normal and breath sounds normal. No respiratory distress.   Abdominal: Soft. He exhibits no distension.   Genitourinary:   Genitourinary Comments: Perineum: mostly well-healed except for small sinus tract.  Small amount purulen drainage upon bear down.   Musculoskeletal: Normal range of motion. He exhibits no edema or deformity.   Neurological: He is alert and oriented to person, place, and time. No cranial nerve deficit. Coordination and gait normal.   Skin: Skin is warm and dry.   Psychiatric: He has a normal mood and affect. His behavior is normal. Judgment  normal.       Review of Medical Record:  I reviewed records endoscopic evaluation with Dr. Dhaliwal in 2018: small perineal sinus tract    Assessment:  1. Perineal sinus    2. History of rectal cancer        Plan:  Likely chronic abscess cavity after initial surgery.  It is never healed.  It intermittently drains a lot.    I recommend CT a/p for further evaluation of perineal sinus tract.  Patient to follow-up after  results of CT. we discussed potential debridement of cavity with drain placement.  I discussed risk including bleeding, infection; benefits; and alternatives.  I discussed in detail expected recovery, possible urinary retention, time off activities, and healing.  We discussed that since this is been going on for 25 years that it may be tough to get this to completely heal.      Scribed for Tianna Sagastume MD by Danni Albarado PA-C  5/5/2020   This patient was evaluated by me, recommendations made, documentation reviewed, edited, and revised by me, Tianna Sagastume MD

## 2020-05-08 ENCOUNTER — HOSPITAL ENCOUNTER (OUTPATIENT)
Dept: CT IMAGING | Facility: HOSPITAL | Age: 75
Discharge: HOME | End: 2020-05-08
Admitting: PHYSICIAN ASSISTANT

## 2020-05-08 DIAGNOSIS — N36.0 PERINEAL SINUS: ICD-10-CM

## 2020-05-08 PROCEDURE — 25010000002 IOPAMIDOL 61 % SOLUTION: Performed by: PHYSICIAN ASSISTANT

## 2020-05-08 PROCEDURE — 82565 ASSAY OF CREATININE: CPT

## 2020-05-08 PROCEDURE — 74177 CT ABD & PELVIS W/CONTRAST: CPT

## 2020-05-08 RX ADMIN — IOPAMIDOL 85 ML: 612 INJECTION, SOLUTION INTRAVENOUS at 17:27

## 2020-05-11 ENCOUNTER — OFFICE VISIT (OUTPATIENT)
Dept: SURGERY | Facility: CLINIC | Age: 75
End: 2020-05-11

## 2020-05-11 VITALS
DIASTOLIC BLOOD PRESSURE: 90 MMHG | SYSTOLIC BLOOD PRESSURE: 180 MMHG | HEIGHT: 68 IN | TEMPERATURE: 96.6 F | WEIGHT: 249.3 LBS | HEART RATE: 78 BPM | BODY MASS INDEX: 37.78 KG/M2 | OXYGEN SATURATION: 98 %

## 2020-05-11 DIAGNOSIS — N36.0 PERINEAL SINUS: Primary | ICD-10-CM

## 2020-05-11 DIAGNOSIS — Z85.048 HISTORY OF RECTAL CANCER: ICD-10-CM

## 2020-05-11 LAB — CREAT BLDA-MCNC: 0.9 MG/DL (ref 0.6–1.3)

## 2020-05-11 PROCEDURE — 99213 OFFICE O/P EST LOW 20 MIN: CPT | Performed by: COLON & RECTAL SURGERY

## 2020-05-11 RX ORDER — CEFAZOLIN SODIUM 2 G/100ML
2 INJECTION, SOLUTION INTRAVENOUS ONCE
Status: CANCELLED | OUTPATIENT
Start: 2020-06-04 | End: 2020-05-11

## 2020-05-11 NOTE — PROGRESS NOTES
Clarissa Diaz is a 74 y.o. male in for follow up of Perineal sinus    History of rectal cancer      Pt states since last appt, he has had increased drainage per perineum  Denies any fever  States the drainage got better when he was on antibiotics for a sinus issue    Past Medical History:   Diagnosis Date   • Allergic dermatitis due to poison ivy 09/12/2012   • Arthritis    • At risk for transmitting infection to others 4/19/2016   • B12 deficiency    • Bone spur    • BPH (benign prostatic hyperplasia)     FOLLOWED BY DR. SALAS OGLESBY   • Bruises easily    • Cellulitis of left lower extremity 3/10/2020   • Closed displaced comminuted fracture of right patella 10/01/2019    SEEN AT [place] [facility]   • Colostomy and enterostomy malfunction (CMS/HCC) 3/10/2020   • Diabetes mellitus (CMS/HCC)     TYPE 2, NIDDM   • Esophageal dysphagia    • Fatigue    • GERD (gastroesophageal reflux disease)    • Hyperlipidemia    • Hypertension    • Ileus (CMS/HCC) 10/03/2014    ADMITTED TO [facility]   • Influenza 01/01/2013   • Influenza A 01/03/2014   • Left leg swelling 02/2020   • OA (osteoarthritis)    • Onychomycosis 05/2016   • Osteopenia    • Polio 1951   • Prostatitis    • PSA elevation 10/2014    FOLLOWED BY DR. 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 NO CHANGES   • Rectal cancer (CMS/HCC) 1996    S/P APR WITH colOSTOMY   • Rupture of right patellar tendon 10/2019    FOLLOWED BY DR. CLARISSA MAGAÑA   • Seasonal allergies    • Shingles 04/26/2016    SEEN AT [facility] ER   • Sleep apnea     CPAP   • Thrombocytopenia (CMS/HCC)    • Vitamin D deficiency      Past Surgical History:   Procedure Laterality Date   • CHOLECYSTECTOMY N/A 08/09/2012    LAPAROSCOPIC, DR. EDSON JIMENEZ AT [facility]   • COLON RESECTION N/A 04/1996    APR WITH COLOSTOMY, DR.DAVID TAYLOR   • COLONOSCOPY N/A     DR. LAI RAMIREZ   • ENDOSCOPY N/A 12/18/2018  "   NO ENDOSCOPIC ABNORMALITY WAS EVIDENT, DILATION OF ESOPHAGUS WITH LITTLEJOHN DILATOR TO 48FR AND 52FR, DR. ADELIA TOLEDO AT [facility]   • FOOT SURGERY Left 07/1958    RUBIO PROCEDURE FOR TENDON REPAIR, GREAT TOE   • KNEE ARTHROPLASTY Left     2013   • KNEE CARTILAGE SURGERY Left    • ORIF FINGER / THUMB FRACTURE Left 1985   • PATELLA TENDON REPAIR Right 10/10/2019    Procedure: PATELLA TENDON REPAIR;  Surgeon: Luis Magaña MD;  Location: [facility] OR;  Service: Orthopedics   • SIGMOIDOSCOPY N/A 12/18/2018    NO CASTILLO'S POUCH WAS PRESENT, A SMALL SINUS TRACT WITH MATERIAL SUTURE AS A WICK WAS PRESENT, DR. ADELIA TOLEDO AT [facility]   • TONSILLECTOMY AND ADENOIDECTOMY Bilateral 1950    AT [facility]   • TOTAL HIP ARTHROPLASTY Right 03/06/2014    DR. LUIS MAGAÑA AT [facility]   • TOTAL KNEE ARTHROPLASTY Right 8/8/2019    Procedure: RIGHT TOTAL KNEE ARTHROPLASTY;  Surgeon: Luis Magaña MD;  Location: [facility] OR;  Service: Orthopedics         /90 (BP Location: Left arm, Patient Position: Sitting, Cuff Size: Adult)   Pulse 78   Temp 96.6 °F (35.9 °C) (Temporal)   Ht 172.7 cm (68\")   Wt 113 kg (249 lb 4.8 oz)   SpO2 98%   BMI 37.91 kg/m²   Body mass index is 37.91 kg/m².      PE:  Physical Exam   Constitutional: He appears well-developed. No distress.   HENT:   Head: Normocephalic and atraumatic.   Abdominal: Soft. He exhibits no distension.   Musculoskeletal: Normal range of motion.   Neurological: He is alert.   Psychiatric: Thought content normal.     Med rec review: I reviewed CT images and report which shows a to 6cm long x 3cm wide fluid-filled cavity in the rectal space    Assessment:   1. Perineal sinus    2. History of rectal cancer         Plan:    Discussion with pt regarding CT results, which showed large fluid-filled cavity.  I recommend excision/debridement of perineal sinus cavity with drain placement.  I described with patient typical surgical time, postop recovery including pain " management, and restrictions. I discussed with patient risks, benefits, and alternatives.  The patient had opportunity to ask questions.  I answered all questions.  Patient understands and wishes to proceed with procedure.    Will perform procedure in [place].    Scribed for Tianna Sagastume MD by Danni Albarado PA-C  5/11/2020   This patient was evaluated by me, recommendations made, documentation reviewed, edited, and revised by me, Tianna Sagastume MD

## 2020-06-02 ENCOUNTER — APPOINTMENT (OUTPATIENT)
Dept: PREADMISSION TESTING | Facility: HOSPITAL | Age: 75
End: 2020-06-02

## 2020-06-02 VITALS
TEMPERATURE: 97.9 F | RESPIRATION RATE: 18 BRPM | WEIGHT: 242.7 LBS | DIASTOLIC BLOOD PRESSURE: 74 MMHG | BODY MASS INDEX: 36.78 KG/M2 | OXYGEN SATURATION: 98 % | HEART RATE: 68 BPM | HEIGHT: 68 IN | SYSTOLIC BLOOD PRESSURE: 164 MMHG

## 2020-06-02 LAB
ANION GAP SERPL CALCULATED.3IONS-SCNC: 8.6 MMOL/L (ref 5–15)
BUN BLD-MCNC: 17 MG/DL (ref 8–23)
BUN/CREAT SERPL: 17.7 (ref 7–25)
CALCIUM SPEC-SCNC: 9.9 MG/DL (ref 8.6–10.5)
CHLORIDE SERPL-SCNC: 103 MMOL/L (ref 98–107)
CO2 SERPL-SCNC: 25.4 MMOL/L (ref 22–29)
CREAT BLD-MCNC: 0.96 MG/DL (ref 0.76–1.27)
GFR SERPL CREATININE-BSD FRML MDRD: 77 ML/MIN/1.73
GLUCOSE BLD-MCNC: 164 MG/DL (ref 65–99)
POTASSIUM BLD-SCNC: 4.1 MMOL/L (ref 3.5–5.2)
SODIUM BLD-SCNC: 137 MMOL/L (ref 136–145)

## 2020-06-02 PROCEDURE — 93010 ELECTROCARDIOGRAM REPORT: CPT | Performed by: INTERNAL MEDICINE

## 2020-06-02 PROCEDURE — C9803 HOPD COVID-19 SPEC COLLECT: HCPCS

## 2020-06-02 PROCEDURE — U0004 COV-19 TEST NON-CDC HGH THRU: HCPCS | Performed by: COLON & RECTAL SURGERY

## 2020-06-02 PROCEDURE — 80048 BASIC METABOLIC PNL TOTAL CA: CPT | Performed by: COLON & RECTAL SURGERY

## 2020-06-02 PROCEDURE — 93005 ELECTROCARDIOGRAM TRACING: CPT

## 2020-06-02 PROCEDURE — 36415 COLL VENOUS BLD VENIPUNCTURE: CPT

## 2020-06-02 NOTE — DISCHARGE INSTRUCTIONS
Take the following medications the morning of surgery:  NONE      General Instructions: CLEAR LIQUIDS UNTIL 6:00 AM MORNING OF SURGERY  • Do not eat solid food after midnight the night before surgery.  • You may drink clear liquids day of surgery but must stop at least one hour before your hospital arrival time.  • It is beneficial for you to have a clear drink that contains carbohydrates the day of surgery.  We suggest a 12 to 20 ounce bottle of Gatorade or Powerade for non-diabetic patients or a 12 to 20 ounce bottle of G2 or Powerade Zero for diabetic patients. (Pediatric patients, are not advised to drink a 12 to 20 ounce carbohydrate drink)    Clear liquids are liquids you can see through.  Nothing red in color.     Plain water                               Sports drinks  Sodas                                   Gelatin (Jell-O)  Fruit juices without pulp such as white grape juice and apple juice  Popsicles that contain no fruit or yogurt  Tea or coffee (no cream or milk added)  Gatorade / Powerade  G2 / Powerade Zero    • Infants may have breast milk up to four hours before surgery.  • Infants drinking formula may drink formula up to six hours before surgery.   • Patients who avoid smoking, chewing tobacco and alcohol for 4 weeks prior to surgery have a reduced risk of post-operative complications.  Quit smoking as many days before surgery as you can.  • Do not smoke, use chewing tobacco or drink alcohol the day of surgery.   • If applicable bring your C-PAP/ BI-PAP machine.  • Bring any papers given to you in the doctor’s office.  • Wear clean comfortable clothes.  • Do not wear contact lenses, false eyelashes or make-up.  Bring a case for your glasses.   • Bring crutches or walker if applicable.  • Remove all piercings.  Leave jewelry and any other valuables at home.  • Hair extensions with metal clips must be removed prior to surgery.  • The Pre-Admission Testing nurse will instruct you to bring medications  if unable to obtain an accurate list in Pre-Admission Testing.        If you were given a blood bank ID arm band remember to bring it with you the day of surgery.    Preventing a Surgical Site Infection:  • For 2 to 3 days before surgery, avoid shaving with a razor because the razor can irritate skin and make it easier to develop an infection.    • Any areas of open skin can increase the risk of a post-operative wound infection by allowing bacteria to enter and travel throughout the body.  Notify your surgeon if you have any skin wounds / rashes even if it is not near the expected surgical site.  The area will need assessed to determine if surgery should be delayed until it is healed.  • The night prior to surgery shower using a fresh bar of anti-bacterial soap (such as Dial) and clean washcloth.  Sleep in a clean bed with clean clothing.  Do not allow pets to sleep with you.  • Shower on the morning of surgery using a fresh bar of anti-bacterial soap (such as Dial) and clean washcloth.  Dry with a clean towel and dress in clean clothing.  • Ask your surgeon if you will be receiving antibiotics prior to surgery.  • Make sure you, your family, and all healthcare providers clean their hands with soap and water or an alcohol based hand  before caring for you or your wound.    Day of surgery: 6/4/2020 ARRIVAL TIME 7:00 AM  Your arrival time is approximately two hours before your scheduled surgery time.  Upon arrival, a Pre-op nurse and Anesthesiologist will review your health history, obtain vital signs, and answer questions you may have.  The only belongings needed at this time will be a list of your home medications and if applicable your C-PAP/BI-PAP machine.  If you are staying overnight your family can leave the rest of your belongings in the car and bring them to your room later.  A Pre-op nurse will start an IV and you may receive medication in preparation for surgery, including something to help you  relax.  Your family will be able to see you in the Pre-op area.  Two visitors at a time will be allowed in the Pre-op room.  While you are in surgery your family should notify the waiting room  if they leave the waiting room area and provide a contact phone number.    Please be aware that surgery does come with discomfort.  We want to make every effort to control your discomfort so please discuss any uncontrolled symptoms with your nurse.   Your doctor will most likely have prescribed pain medications.      If you are going home after surgery you will receive individualized written care instructions before being discharged.  A responsible adult must drive you to and from the hospital on the day of your surgery and stay with you for 24 hours.    If you are staying overnight following surgery, you will be transported to your hospital room following the recovery period.  [facility] has all private rooms.    If you have any questions please call Pre-Admission Testing at [phone].  Deductibles and co-payments are collected on the day of service. Please be prepared to pay the required co-pay, deductible or deposit on the day of service as defined by your plan.    Self-Quarantine Prior to Surgery    • Stay at home. Do not leave your home after you have been given the Covid test for your upcoming surgery.   • Wash your hands often with soap and water for at least 20 seconds especially after you have been in a public place, or after blowing your nose, coughing, or sneezing.  • If soap and water are not readily available, use a hand  that contains at least 60% alcohol. Cover all surfaces of your hands and rub them together until they feel dry.  • Avoid touching your eyes, nose, and mouth with unwashed hands.  • Take everyday precautions to keep space between yourself and others (stay 6 feet away, which is about two arm lengths).  Remember that some people without symptoms may be able  to spread virus.  • You should stay in a specific room away from others if possible.  Stay at least 6 feet away from others in the home if you cannot have a dedicated room to yourself. Do not have visitors.   • Wear a mask around others in your home if you are unable to stay in a separate room or 6 feet apart. If you are unable to wear a mask, have your family member wear a mask if they must be within 6 feet of you.   • Do not snuggle with your pet. While the CDC says there is no evidence that pets can spread COVID-19 or be infected from humans, it is probably best to avoid “petting, snuggling, being kissed or licked and sharing food (during self-quarantine)”, according to the CDC.   • Do not share anything - Have your own utensils, drinking glass, dishes, towels and bedding.   • Do not share a bathroom with others, if possible.   • Clean and disinfect frequently touched services daily. This includes tables, doorknobs, light switches, countertops, handles, desks, phones, keyboards, toilets, faucets, and sinks.  • Do not travel prior to surgery.    Call your surgeon immediately if you experience any of the following symptoms:  • Sore Throat      • Shortness of Breath or difficulty breathing  • Cough  • Chills  • Body soreness or muscle pain  • Headache  • Fever  • New loss of taste or smell  • Do not arrive for your surgery ill.  Your procedure will need to be rescheduled to another time.  You will need to call your physician before the day of surgery to avoid any unnecessary exposure to hospital staff as well as other patients.    CHLORHEXIDINE CLOTH INSTRUCTIONS  The morning of surgery follow these instructions using the Chlorhexidine cloths you've been given.  These steps reduce bacteria on the body.  Do not use the cloths near your eyes, ears mouth, genitalia or on open wounds.  Throw the cloths away after use but do not try to flush them down a toilet.      • Open and remove one cloth at a time from the package.     • Leave the cloth unfolded and begin the bathing.  • Massage the skin with the cloths using gentle pressure to remove bacteria.  Do not scrub harshly.   • Follow the steps below with one 2% CHG cloth per area (6 total cloths).  • One cloth for neck, shoulders and chest.  • One cloth for both arms, hands, fingers and underarms (do underarms last).  • One cloth for the abdomen followed by groin.  • One cloth for right leg and foot including between the toes.  • One cloth for left leg and foot including between the toes.  • The last cloth is to be used for the back of the neck, back and buttocks.    Allow the CHG to air dry 3 minutes on the skin which will give it time to work and decrease the chance of irritation.  The skin may feel sticky until it is dry.  Do not rinse with water or any other liquid or you will lose the beneficial effects of the CHG.  If mild skin irritation occurs, do rinse the skin to remove the CHG.  Report this to the nurse at time of admission.  Do not apply lotions, creams, ointments, deodorants or perfumes after using the clothes. Dress in clean clothes before coming to the hospital.

## 2020-06-03 LAB
REF LAB TEST METHOD: NORMAL
SARS-COV-2 RNA RESP QL NAA+PROBE: NOT DETECTED

## 2020-06-04 ENCOUNTER — ANESTHESIA EVENT (OUTPATIENT)
Dept: PERIOP | Facility: HOSPITAL | Age: 75
End: 2020-06-04

## 2020-06-04 ENCOUNTER — ANESTHESIA (OUTPATIENT)
Dept: PERIOP | Facility: HOSPITAL | Age: 75
End: 2020-06-04

## 2020-06-04 ENCOUNTER — HOSPITAL ENCOUNTER (OUTPATIENT)
Facility: HOSPITAL | Age: 75
Setting detail: HOSPITAL OUTPATIENT SURGERY
Discharge: HOME | End: 2020-06-04
Attending: COLON & RECTAL SURGERY | Admitting: COLON & RECTAL SURGERY

## 2020-06-04 VITALS
SYSTOLIC BLOOD PRESSURE: 150 MMHG | TEMPERATURE: 97.7 F | RESPIRATION RATE: 18 BRPM | OXYGEN SATURATION: 95 % | HEART RATE: 72 BPM | HEIGHT: 68 IN | BODY MASS INDEX: 36.1 KG/M2 | DIASTOLIC BLOOD PRESSURE: 75 MMHG | WEIGHT: 238.2 LBS

## 2020-06-04 DIAGNOSIS — N36.0 PERINEAL SINUS: ICD-10-CM

## 2020-06-04 LAB
GLUCOSE BLDC GLUCOMTR-MCNC: 132 MG/DL (ref 70–130)
GLUCOSE BLDC GLUCOMTR-MCNC: 148 MG/DL (ref 70–130)

## 2020-06-04 PROCEDURE — 25010000002 PROPOFOL 10 MG/ML EMULSION: Performed by: NURSE ANESTHETIST, CERTIFIED REGISTERED

## 2020-06-04 PROCEDURE — C2627 CATH, SUPRAPUBIC/CYSTOSCOPIC: HCPCS | Performed by: COLON & RECTAL SURGERY

## 2020-06-04 PROCEDURE — 25010000002 SUCCINYLCHOLINE PER 20 MG: Performed by: NURSE ANESTHETIST, CERTIFIED REGISTERED

## 2020-06-04 PROCEDURE — 25010000002 FENTANYL CITRATE (PF) 100 MCG/2ML SOLUTION: Performed by: NURSE ANESTHETIST, CERTIFIED REGISTERED

## 2020-06-04 PROCEDURE — 46040 I&D ISCHIORCT&/PERIRCT ABSC: CPT | Performed by: COLON & RECTAL SURGERY

## 2020-06-04 PROCEDURE — 87075 CULTR BACTERIA EXCEPT BLOOD: CPT | Performed by: COLON & RECTAL SURGERY

## 2020-06-04 PROCEDURE — 87070 CULTURE OTHR SPECIMN AEROBIC: CPT | Performed by: COLON & RECTAL SURGERY

## 2020-06-04 PROCEDURE — 25010000002 MIDAZOLAM PER 1 MG: Performed by: ANESTHESIOLOGY

## 2020-06-04 PROCEDURE — 25010000002 ONDANSETRON PER 1 MG: Performed by: NURSE ANESTHETIST, CERTIFIED REGISTERED

## 2020-06-04 PROCEDURE — 87076 CULTURE ANAEROBE IDENT EACH: CPT | Performed by: COLON & RECTAL SURGERY

## 2020-06-04 PROCEDURE — 88304 TISSUE EXAM BY PATHOLOGIST: CPT | Performed by: COLON & RECTAL SURGERY

## 2020-06-04 PROCEDURE — 87205 SMEAR GRAM STAIN: CPT | Performed by: COLON & RECTAL SURGERY

## 2020-06-04 PROCEDURE — 82962 GLUCOSE BLOOD TEST: CPT

## 2020-06-04 PROCEDURE — 25010000003 CEFAZOLIN IN DEXTROSE 2-4 GM/100ML-% SOLUTION: Performed by: PHYSICIAN ASSISTANT

## 2020-06-04 PROCEDURE — 25010000002 DEXAMETHASONE PER 1 MG: Performed by: NURSE ANESTHETIST, CERTIFIED REGISTERED

## 2020-06-04 RX ORDER — FENTANYL CITRATE 50 UG/ML
INJECTION, SOLUTION INTRAMUSCULAR; INTRAVENOUS AS NEEDED
Status: DISCONTINUED | OUTPATIENT
Start: 2020-06-04 | End: 2020-06-04 | Stop reason: SURG

## 2020-06-04 RX ORDER — SODIUM CHLORIDE 0.9 % (FLUSH) 0.9 %
10 SYRINGE (ML) INJECTION AS NEEDED
Status: DISCONTINUED | OUTPATIENT
Start: 2020-06-04 | End: 2020-06-04 | Stop reason: HOSPADM

## 2020-06-04 RX ORDER — FAMOTIDINE 10 MG/ML
20 INJECTION, SOLUTION INTRAVENOUS
Status: COMPLETED | OUTPATIENT
Start: 2020-06-04 | End: 2020-06-04

## 2020-06-04 RX ORDER — HYDRALAZINE HYDROCHLORIDE 20 MG/ML
5 INJECTION INTRAMUSCULAR; INTRAVENOUS
Status: DISCONTINUED | OUTPATIENT
Start: 2020-06-04 | End: 2020-06-04 | Stop reason: HOSPADM

## 2020-06-04 RX ORDER — OXYCODONE HYDROCHLORIDE AND ACETAMINOPHEN 5; 325 MG/1; MG/1
1-2 TABLET ORAL EVERY 6 HOURS PRN
Qty: 24 TABLET | Refills: 0 | Status: SHIPPED | OUTPATIENT
Start: 2020-06-04 | End: 2020-06-12

## 2020-06-04 RX ORDER — HYDROMORPHONE HYDROCHLORIDE 1 MG/ML
0.5 INJECTION, SOLUTION INTRAMUSCULAR; INTRAVENOUS; SUBCUTANEOUS
Status: DISCONTINUED | OUTPATIENT
Start: 2020-06-04 | End: 2020-06-04 | Stop reason: HOSPADM

## 2020-06-04 RX ORDER — POLYETHYLENE GLYCOL 3350 17 G/17G
17 POWDER, FOR SOLUTION ORAL DAILY
Status: ON HOLD
Start: 2020-06-04

## 2020-06-04 RX ORDER — FLUMAZENIL 0.1 MG/ML
0.2 INJECTION INTRAVENOUS AS NEEDED
Status: DISCONTINUED | OUTPATIENT
Start: 2020-06-04 | End: 2020-06-04 | Stop reason: HOSPADM

## 2020-06-04 RX ORDER — MIDAZOLAM HYDROCHLORIDE 1 MG/ML
1 INJECTION INTRAMUSCULAR; INTRAVENOUS
Status: DISCONTINUED | OUTPATIENT
Start: 2020-06-04 | End: 2020-06-04 | Stop reason: HOSPADM

## 2020-06-04 RX ORDER — SUCCINYLCHOLINE CHLORIDE 20 MG/ML
INJECTION INTRAMUSCULAR; INTRAVENOUS AS NEEDED
Status: DISCONTINUED | OUTPATIENT
Start: 2020-06-04 | End: 2020-06-04 | Stop reason: SURG

## 2020-06-04 RX ORDER — SACCHAROMYCES BOULARDII 250 MG
250 CAPSULE ORAL 2 TIMES DAILY
Qty: 60 CAPSULE | Refills: 2 | Status: ON HOLD | OUTPATIENT
Start: 2020-06-04

## 2020-06-04 RX ORDER — NALOXONE HCL 0.4 MG/ML
0.2 VIAL (ML) INJECTION AS NEEDED
Status: DISCONTINUED | OUTPATIENT
Start: 2020-06-04 | End: 2020-06-04 | Stop reason: HOSPADM

## 2020-06-04 RX ORDER — AMOXICILLIN AND CLAVULANATE POTASSIUM 875; 125 MG/1; MG/1
1 TABLET, FILM COATED ORAL 2 TIMES DAILY
Qty: 28 TABLET | Refills: 0 | Status: SHIPPED | OUTPATIENT
Start: 2020-06-04 | End: 2020-07-01

## 2020-06-04 RX ORDER — LIDOCAINE HYDROCHLORIDE 20 MG/ML
INJECTION, SOLUTION INFILTRATION; PERINEURAL AS NEEDED
Status: DISCONTINUED | OUTPATIENT
Start: 2020-06-04 | End: 2020-06-04 | Stop reason: SURG

## 2020-06-04 RX ORDER — FENTANYL CITRATE 50 UG/ML
50 INJECTION, SOLUTION INTRAMUSCULAR; INTRAVENOUS
Status: DISCONTINUED | OUTPATIENT
Start: 2020-06-04 | End: 2020-06-04 | Stop reason: HOSPADM

## 2020-06-04 RX ORDER — SODIUM CHLORIDE, SODIUM LACTATE, POTASSIUM CHLORIDE, CALCIUM CHLORIDE 600; 310; 30; 20 MG/100ML; MG/100ML; MG/100ML; MG/100ML
9 INJECTION, SOLUTION INTRAVENOUS CONTINUOUS PRN
Status: DISCONTINUED | OUTPATIENT
Start: 2020-06-04 | End: 2020-06-04 | Stop reason: HOSPADM

## 2020-06-04 RX ORDER — SODIUM CHLORIDE 0.9 % (FLUSH) 0.9 %
10 SYRINGE (ML) INJECTION EVERY 12 HOURS SCHEDULED
Status: DISCONTINUED | OUTPATIENT
Start: 2020-06-04 | End: 2020-06-04 | Stop reason: HOSPADM

## 2020-06-04 RX ORDER — ACETAMINOPHEN 325 MG/1
650 TABLET ORAL ONCE AS NEEDED
Status: DISCONTINUED | OUTPATIENT
Start: 2020-06-04 | End: 2020-06-04 | Stop reason: HOSPADM

## 2020-06-04 RX ORDER — PROPOFOL 10 MG/ML
VIAL (ML) INTRAVENOUS AS NEEDED
Status: DISCONTINUED | OUTPATIENT
Start: 2020-06-04 | End: 2020-06-04 | Stop reason: SURG

## 2020-06-04 RX ORDER — DEXAMETHASONE SODIUM PHOSPHATE 10 MG/ML
INJECTION INTRAMUSCULAR; INTRAVENOUS AS NEEDED
Status: DISCONTINUED | OUTPATIENT
Start: 2020-06-04 | End: 2020-06-04 | Stop reason: SURG

## 2020-06-04 RX ORDER — MIDAZOLAM HYDROCHLORIDE 1 MG/ML
2 INJECTION INTRAMUSCULAR; INTRAVENOUS
Status: DISCONTINUED | OUTPATIENT
Start: 2020-06-04 | End: 2020-06-04 | Stop reason: HOSPADM

## 2020-06-04 RX ORDER — ONDANSETRON 2 MG/ML
INJECTION INTRAMUSCULAR; INTRAVENOUS AS NEEDED
Status: DISCONTINUED | OUTPATIENT
Start: 2020-06-04 | End: 2020-06-04 | Stop reason: SURG

## 2020-06-04 RX ORDER — ONDANSETRON 2 MG/ML
4 INJECTION INTRAMUSCULAR; INTRAVENOUS ONCE AS NEEDED
Status: DISCONTINUED | OUTPATIENT
Start: 2020-06-04 | End: 2020-06-04 | Stop reason: HOSPADM

## 2020-06-04 RX ORDER — HYDROCODONE BITARTRATE AND ACETAMINOPHEN 7.5; 325 MG/1; MG/1
1 TABLET ORAL ONCE AS NEEDED
Status: DISCONTINUED | OUTPATIENT
Start: 2020-06-04 | End: 2020-06-04 | Stop reason: HOSPADM

## 2020-06-04 RX ORDER — LIDOCAINE 50 MG/G
OINTMENT TOPICAL EVERY 4 HOURS PRN
Qty: 35.44 G | Refills: 5 | Status: SHIPPED | OUTPATIENT
Start: 2020-06-04 | End: 2020-07-01

## 2020-06-04 RX ORDER — ROCURONIUM BROMIDE 10 MG/ML
INJECTION, SOLUTION INTRAVENOUS AS NEEDED
Status: DISCONTINUED | OUTPATIENT
Start: 2020-06-04 | End: 2020-06-04 | Stop reason: SURG

## 2020-06-04 RX ORDER — CEFAZOLIN SODIUM 2 G/100ML
2 INJECTION, SOLUTION INTRAVENOUS ONCE
Status: COMPLETED | OUTPATIENT
Start: 2020-06-04 | End: 2020-06-04

## 2020-06-04 RX ORDER — MAGNESIUM HYDROXIDE 1200 MG/15ML
LIQUID ORAL AS NEEDED
Status: DISCONTINUED | OUTPATIENT
Start: 2020-06-04 | End: 2020-06-04 | Stop reason: HOSPADM

## 2020-06-04 RX ADMIN — ONDANSETRON HYDROCHLORIDE 4 MG: 2 SOLUTION INTRAMUSCULAR; INTRAVENOUS at 09:38

## 2020-06-04 RX ADMIN — SUCCINYLCHOLINE CHLORIDE 200 MG: 20 INJECTION, SOLUTION INTRAMUSCULAR; INTRAVENOUS; PARENTERAL at 09:06

## 2020-06-04 RX ADMIN — FAMOTIDINE 20 MG: 10 INJECTION INTRAVENOUS at 08:54

## 2020-06-04 RX ADMIN — FENTANYL CITRATE 50 MCG: 50 INJECTION INTRAMUSCULAR; INTRAVENOUS at 09:15

## 2020-06-04 RX ADMIN — CEFAZOLIN SODIUM 2 G: 2 INJECTION, SOLUTION INTRAVENOUS at 09:15

## 2020-06-04 RX ADMIN — SODIUM CHLORIDE, POTASSIUM CHLORIDE, SODIUM LACTATE AND CALCIUM CHLORIDE 9 ML/HR: 600; 310; 30; 20 INJECTION, SOLUTION INTRAVENOUS at 07:35

## 2020-06-04 RX ADMIN — LIDOCAINE HYDROCHLORIDE 100 MG: 20 INJECTION, SOLUTION INFILTRATION; PERINEURAL at 09:05

## 2020-06-04 RX ADMIN — ROCURONIUM BROMIDE 10 MG: 10 INJECTION, SOLUTION INTRAVENOUS at 09:06

## 2020-06-04 RX ADMIN — METRONIDAZOLE 500 MG: 500 INJECTION, SOLUTION INTRAVENOUS at 07:35

## 2020-06-04 RX ADMIN — FENTANYL CITRATE 50 MCG: 50 INJECTION INTRAMUSCULAR; INTRAVENOUS at 09:04

## 2020-06-04 RX ADMIN — PROPOFOL 200 MG: 10 INJECTION, EMULSION INTRAVENOUS at 09:06

## 2020-06-04 RX ADMIN — FENTANYL CITRATE 50 MCG: 50 INJECTION, SOLUTION INTRAMUSCULAR; INTRAVENOUS at 10:18

## 2020-06-04 RX ADMIN — SUCCINYLCHOLINE CHLORIDE 20 MG: 20 INJECTION, SOLUTION INTRAMUSCULAR; INTRAVENOUS; PARENTERAL at 09:15

## 2020-06-04 RX ADMIN — DEXAMETHASONE SODIUM PHOSPHATE 4 MG: 10 INJECTION INTRAMUSCULAR; INTRAVENOUS at 09:20

## 2020-06-04 RX ADMIN — SUGAMMADEX 200 MG: 100 INJECTION, SOLUTION INTRAVENOUS at 09:40

## 2020-06-04 RX ADMIN — MIDAZOLAM 1 MG: 1 INJECTION INTRAMUSCULAR; INTRAVENOUS at 08:54

## 2020-06-04 NOTE — ANESTHESIA PREPROCEDURE EVALUATION
Anesthesia Evaluation     NPO Solid Status: > 8 hours             Airway   Mallampati: III  TM distance: >3 FB  Neck ROM: limited  Possible difficult intubation  Dental      Pulmonary    (+) sleep apnea on CPAP,   Cardiovascular     ECG reviewed  Rhythm: regular  Rate: normal    (+) hypertension less than 2 medications, hyperlipidemia,       Neuro/Psych  GI/Hepatic/Renal/Endo    (+)  GERD well controlled,  diabetes mellitus type 2,     Musculoskeletal     Abdominal   (+) obese,    Substance History      OB/GYN          Other   arthritis,    history of cancer remission                    Anesthesia Plan    ASA 3     general     intravenous induction          Parent(s)

## 2020-06-04 NOTE — OP NOTE
06/04/20    Surgeon: Tianna Sagastume MD    Surgical  Assistant: Danni Albarado PA-C     Preoperative diagnosis: Perineal sinus [N36.0]    Post-Op Diagnosis Codes:     * Perineal sinus [N36.0]    Procedure: incision/debridement of perineal sinus cavity with drain placement, * Panel 2 does not exist *    Estimated Blood Loss: 25 mL    Specimens:   Specimens     ID Source Type Tests Collected By Collected At Frozen?      1 Perineum Wound · ANAEROBIC CULTURE  · WOUND CULTURE   Tianna Sagastume MD 6/4/20 0929      Description: chronic abscess    A Perineum Tissue · TISSUE PATHOLOGY EXAM   Tianna Sagastume MD 6/4/20 0929 No     Description: chronic abscess         Order Name Source Comment Collection Info Order Time   ANAEROBIC CULTURE Perineum  Collected By: Tianna Sagastume MD 6/4/2020  9:30 AM   WOUND CULTURE Perineum  Collected By: Tianna Sagastume MD 6/4/2020  9:30 AM   TISSUE PATHOLOGY EXAM Perineum  Collected By: Tianna Sagastume MD 6/4/2020  9:29 AM       Indication:  Luis Diaz is a 74 y.o. male who comes in with Perineal sinus .  This is been going on for 25 years.  It has been draining intermittently since his APR.    Patient understands risks, benefits,and alternatives wishes to proceed.      Procedure Details:  Patient was brought into the operating room, SCDs in place, antibiotics infused. After general anesthesia was achieved, patient was placed in candy cane lithotomy position. He was then prepped and draped in sterile fashion; 1% lidocaine with epinephrine and 0.25% Marcaine without was used as a local infiltration in a perineum block. The cavity tracked from the anterior perineum posterior into a cavity. I used a Bhat fistula probe as a guide into the tract and used electrocautery and cut on this. I made an incision about 4 cm long,  the levators and got into the cavity. I took a culture of the purulent drainage that was in there and I then debrided the walls of the cavity laterally,  roughed up the area anteriorly because of the fear of either getting into small bowel or bladder and then placed an 18-Azeri Pezzer drain into the cavity. I sutured the Pezzer in with a 2-0 silk. The tissue debridement was fairly minimal, less than 2 cm of tissue. All instrument, lap counts and needle counts were correct. Patient was stable throughout the entire case. He was taken to recovery.

## 2020-06-04 NOTE — ANESTHESIA PROCEDURE NOTES
Airway  Urgency: elective    Date/Time: 6/4/2020 9:09 AM  Airway not difficult    General Information and Staff    Patient location during procedure: OR  Anesthesiologist: Ronni Amato MD  CRNA: Mary Grewal CRNA    Indications and Patient Condition  Indications for airway management: airway protection    Preoxygenated: yes (pt pre-O2 with 100% O2)  Mask difficulty assessment: 2 - vent by mask + OA or adjuvant +/- NMBA (easy BMV )    Final Airway Details  Final airway type: endotracheal airway      Successful airway: ETT  Cuffed: yes   Successful intubation technique: direct laryngoscopy  Facilitating devices/methods: anterior pressure/BURP  Endotracheal tube insertion site: oral  Blade: Jasmyne  Blade size: 4  ETT size (mm): 7.5  Cormack-Lehane Classification: grade I - full view of glottis  Placement verified by: chest auscultation and capnometry   Cuff volume (mL): 7  Measured from: lips  ETT/EBT  to lips (cm): 22  Number of attempts at approach: 1  Assessment: lips, teeth, and gum same as pre-op and atraumatic intubation    Additional Comments  ATOETx1. No change in dentition.

## 2020-06-04 NOTE — ANESTHESIA POSTPROCEDURE EVALUATION
Patient: Luis Diaz    Procedure Summary     Date:  06/04/20 Room / Location:  [facility] OR [address] [place] [address] MAIN OR    Anesthesia Start:  0901 Anesthesia Stop:  0957    Procedure:  excision/debridement of perineal sinus cavity with drain placement (N/A Perirectal) Diagnosis:       Perineal sinus      (Perineal sinus [N36.0])    Surgeon:  Tianna Sagastume MD Provider:  Ronni Amato MD    Anesthesia Type:  general ASA Status:  3          Anesthesia Type: No value filed.    Vitals  Vitals Value Taken Time   /73 6/4/2020 11:00 AM   Temp 36.5 °C (97.7 °F) 6/4/2020  9:54 AM   Pulse 68 6/4/2020 11:03 AM   Resp 16 6/4/2020 10:45 AM   SpO2 94 % 6/4/2020 11:03 AM   Vitals shown include unvalidated device data.        Post Anesthesia Care and Evaluation    Patient location during evaluation: PHASE II  Patient participation: complete - patient participated  Level of consciousness: awake  Pain score: 1  Pain management: adequate  Airway patency: patent  Anesthetic complications: No anesthetic complications  PONV Status: none  Cardiovascular status: acceptable  Respiratory status: acceptable  Hydration status: acceptable

## 2020-06-05 LAB
CYTO UR: NORMAL
LAB AP CASE REPORT: NORMAL
PATH REPORT.FINAL DX SPEC: NORMAL
PATH REPORT.GROSS SPEC: NORMAL

## 2020-06-07 LAB
BACTERIA SPEC AEROBE CULT: NORMAL
GRAM STN SPEC: NORMAL
GRAM STN SPEC: NORMAL

## 2020-06-10 LAB
BACTERIA SPEC ANAEROBE CULT: ABNORMAL
BACTERIA SPEC ANAEROBE CULT: ABNORMAL

## 2020-06-12 ENCOUNTER — OFFICE VISIT (OUTPATIENT)
Dept: SURGERY | Facility: CLINIC | Age: 75
End: 2020-06-12

## 2020-06-12 VITALS
HEIGHT: 68 IN | OXYGEN SATURATION: 98 % | HEART RATE: 81 BPM | BODY MASS INDEX: 35.31 KG/M2 | DIASTOLIC BLOOD PRESSURE: 72 MMHG | SYSTOLIC BLOOD PRESSURE: 128 MMHG | WEIGHT: 233 LBS

## 2020-06-12 DIAGNOSIS — Z85.048 HISTORY OF RECTAL CANCER: ICD-10-CM

## 2020-06-12 DIAGNOSIS — N36.0 PERINEAL SINUS: Primary | ICD-10-CM

## 2020-06-12 PROCEDURE — 99024 POSTOP FOLLOW-UP VISIT: CPT | Performed by: COLON & RECTAL SURGERY

## 2020-06-12 NOTE — PROGRESS NOTES
"Luis Diaz is a 74 y.o. male in for follow up of Perineal sinus    History of rectal cancer   s/p excision/debridement of perineal sinus cavity with drain placement 6/4/2020    Pt states he has had intermittent drainage per perineal tube.  No concerning symptoms.      /72 (BP Location: Left arm, Patient Position: Sitting, Cuff Size: Adult)   Pulse 81   Ht 172.7 cm (67.99\")   Wt 106 kg (233 lb)   SpO2 98%   BMI 35.44 kg/m²   Body mass index is 35.44 kg/m².      PE:  Physical Exam   Constitutional: He appears well-developed. No distress.   HENT:   Head: Normocephalic and atraumatic.   Abdominal: Soft. He exhibits no distension.   Genitourinary:   Genitourinary Comments: Perineal sinus: Pezzer drain in place.  D/c'ed drain and replaced with smaller drain.  Small amount clear-yellow drainage   Musculoskeletal: Normal range of motion.   Neurological: He is alert.   Psychiatric: Thought content normal.         Assessment:   1. Perineal sinus    2. History of rectal cancer         Plan:      D/c'ed 18 Fr pezzer drain and replaced with smaller 14 Fr pezzer drain; pt tolerated well.  Discussed drain might come out on its own; if it does, this is fine.    Call or come in if any questions or concerns    RTC 5 days      Scribed for Tianna Sagastume MD by Danni Albarado PA-C  6/12/2020   This patient was evaluated by me, recommendations made, documentation reviewed, edited, and revised by me, Tianna Sagastume MD        "

## 2020-06-17 ENCOUNTER — OFFICE VISIT (OUTPATIENT)
Dept: SURGERY | Facility: CLINIC | Age: 75
End: 2020-06-17

## 2020-06-17 VITALS
BODY MASS INDEX: 35.37 KG/M2 | HEIGHT: 68 IN | HEART RATE: 64 BPM | TEMPERATURE: 97.1 F | OXYGEN SATURATION: 98 % | WEIGHT: 233.4 LBS | SYSTOLIC BLOOD PRESSURE: 142 MMHG | DIASTOLIC BLOOD PRESSURE: 80 MMHG

## 2020-06-17 DIAGNOSIS — N36.0 PERINEAL SINUS: Primary | ICD-10-CM

## 2020-06-17 PROCEDURE — 99024 POSTOP FOLLOW-UP VISIT: CPT | Performed by: COLON & RECTAL SURGERY

## 2020-06-17 NOTE — PROGRESS NOTES
"Luis Diaz is a 74 y.o. male in for follow up of Perineal sinus    Patient having a little bit of drainage coming from the tube  No fevers  Finish antibiotic today    /80 (BP Location: Left arm, Patient Position: Sitting, Cuff Size: Small Adult)   Pulse 64   Temp 97.1 °F (36.2 °C)   Ht 172.7 cm (68\")   Wt 106 kg (233 lb 6.4 oz)   SpO2 98%   BMI 35.49 kg/m²   Body mass index is 35.49 kg/m².      PE:  Physical Exam   Constitutional: He appears well-developed. No distress.   HENT:   Head: Normocephalic and atraumatic.   Abdominal: Soft. He exhibits no distension.   Genitourinary:   Genitourinary Comments: Perineum exam: Drain in place with minimal drainage   Musculoskeletal: Normal range of motion.   Neurological: He is alert.   Psychiatric: Thought content normal.         Assessment:   1. Perineal sinus         Plan:      DC drain  Follow-up 2 weeks  Call if fever or increasing pelvic pressure    "

## 2020-07-01 ENCOUNTER — OFFICE VISIT (OUTPATIENT)
Dept: SURGERY | Facility: CLINIC | Age: 75
End: 2020-07-01

## 2020-07-01 VITALS
HEIGHT: 68 IN | BODY MASS INDEX: 36.21 KG/M2 | DIASTOLIC BLOOD PRESSURE: 88 MMHG | OXYGEN SATURATION: 98 % | WEIGHT: 238.9 LBS | SYSTOLIC BLOOD PRESSURE: 160 MMHG | TEMPERATURE: 97.3 F | HEART RATE: 65 BPM

## 2020-07-01 DIAGNOSIS — N36.0 PERINEAL SINUS: Primary | ICD-10-CM

## 2020-07-01 DIAGNOSIS — Z85.048 HISTORY OF RECTAL CANCER: ICD-10-CM

## 2020-07-01 PROCEDURE — 99024 POSTOP FOLLOW-UP VISIT: CPT | Performed by: COLON & RECTAL SURGERY

## 2020-07-01 NOTE — PROGRESS NOTES
"Luis Diaz is a 74 y.o. male in for follow up of Perineal sinus    History of rectal cancer  s/p excision/debridement of perineal sinus cavity with drain placement 6/4/2020  Very minimal drainage  No pain  No pelvic pressure      /88 (BP Location: Left arm, Patient Position: Sitting, Cuff Size: Small Adult)   Pulse 65   Temp 97.3 °F (36.3 °C)   Ht 172.7 cm (68\")   Wt 108 kg (238 lb 14.4 oz)   SpO2 98%   BMI 36.32 kg/m²   Body mass index is 36.32 kg/m².      PE:  Physical Exam   Constitutional: He appears well-developed. No distress.   HENT:   Head: Normocephalic and atraumatic.   Abdominal: Soft. He exhibits no distension.   Genitourinary:   Genitourinary Comments: Perineum: 1 cm wound healing nicely   Musculoskeletal: Normal range of motion.   Neurological: He is alert.   Psychiatric: Thought content normal.       Assessment:   1. Perineal sinus    2. History of rectal cancer    s/p excision/debridement of perineal sinus cavity with drain placement 6/4/2020    Plan:  No special wound care needed.  Appears to be healing well  Continue to monitor for any changes  Does not have anal canal and worried about prostate checks        "

## 2020-07-22 ENCOUNTER — OFFICE VISIT (OUTPATIENT)
Dept: SURGERY | Facility: CLINIC | Age: 75
End: 2020-07-22

## 2020-07-22 VITALS
SYSTOLIC BLOOD PRESSURE: 150 MMHG | HEART RATE: 64 BPM | TEMPERATURE: 97.6 F | DIASTOLIC BLOOD PRESSURE: 90 MMHG | BODY MASS INDEX: 36.13 KG/M2 | HEIGHT: 68 IN | OXYGEN SATURATION: 98 % | WEIGHT: 238.4 LBS

## 2020-07-22 DIAGNOSIS — N36.0 PERINEAL SINUS: Primary | ICD-10-CM

## 2020-07-22 DIAGNOSIS — Z85.048 HISTORY OF RECTAL CANCER: ICD-10-CM

## 2020-07-22 PROCEDURE — 99024 POSTOP FOLLOW-UP VISIT: CPT | Performed by: COLON & RECTAL SURGERY

## 2020-07-22 NOTE — PROGRESS NOTES
"Luis Diaz is a 74 y.o. male in for follow up of Perineal sinus    History of rectal cancer  S/p debridement of perineum chronic abscess  Denies any drainage  No pain  Sitting without difficulty    /90 (BP Location: Left arm, Patient Position: Sitting, Cuff Size: Adult)   Pulse 64   Temp 97.6 °F (36.4 °C) (Temporal)   Ht 172.7 cm (68\")   Wt 108 kg (238 lb 6.4 oz)   SpO2 98%   BMI 36.25 kg/m²   Body mass index is 36.25 kg/m².      PE:  Physical Exam   Constitutional: He appears well-developed. No distress.   HENT:   Head: Normocephalic and atraumatic.   Abdominal: Soft. He exhibits no distension.   Genitourinary:   Genitourinary Comments: healed   Musculoskeletal: Normal range of motion.   Neurological: He is alert.   Psychiatric: Thought content normal.         Assessment:   1. Perineal sinus    2. History of rectal cancer       Status post debridement of chronic perineal abscess  Plan:  Follow-up PRN  Call if perineum starts to drain  Note to urology that patient is status post APR without an anal canal or rectum and to plan accordingly for prostate surveillance        "

## 2022-10-03 ENCOUNTER — PREP FOR SURGERY (OUTPATIENT)
Dept: OTHER | Facility: HOSPITAL | Age: 77
End: 2022-10-03

## 2022-10-03 ENCOUNTER — OFFICE VISIT (OUTPATIENT)
Dept: SURGERY | Facility: CLINIC | Age: 77
End: 2022-10-03

## 2022-10-03 VITALS — WEIGHT: 250 LBS | BODY MASS INDEX: 37.89 KG/M2 | HEIGHT: 68 IN

## 2022-10-03 DIAGNOSIS — R10.11 RIGHT UPPER QUADRANT ABDOMINAL PAIN: ICD-10-CM

## 2022-10-03 DIAGNOSIS — R14.0 ABDOMINAL DISTENSION: Primary | ICD-10-CM

## 2022-10-03 PROCEDURE — 99204 OFFICE O/P NEW MOD 45 MIN: CPT | Performed by: SURGERY

## 2022-10-03 RX ORDER — GLIPIZIDE 10 MG/1
10 TABLET ORAL DAILY
Status: ON HOLD | COMMUNITY

## 2022-10-03 RX ORDER — HYDROCHLOROTHIAZIDE 25 MG/1
25 TABLET ORAL DAILY
Status: ON HOLD | COMMUNITY
Start: 2022-08-07

## 2022-10-03 RX ORDER — METFORMIN HYDROCHLORIDE 750 MG/1
750 TABLET, EXTENDED RELEASE ORAL
Status: ON HOLD | COMMUNITY
Start: 2022-08-25

## 2022-10-03 RX ORDER — SODIUM CHLORIDE 0.9 % (FLUSH) 0.9 %
1-10 SYRINGE (ML) INJECTION AS NEEDED
Status: CANCELLED | OUTPATIENT
Start: 2022-10-17

## 2022-10-03 RX ORDER — AMIODARONE HYDROCHLORIDE 200 MG/1
100 TABLET ORAL DAILY
Status: ON HOLD | COMMUNITY

## 2022-10-03 RX ORDER — SODIUM CHLORIDE 0.9 % (FLUSH) 0.9 %
10 SYRINGE (ML) INJECTION EVERY 12 HOURS SCHEDULED
Status: CANCELLED | OUTPATIENT
Start: 2022-10-17

## 2022-10-10 ENCOUNTER — DOCUMENTATION (OUTPATIENT)
Dept: SURGERY | Facility: CLINIC | Age: 77
End: 2022-10-10

## 2022-10-10 ENCOUNTER — HOSPITAL ENCOUNTER (OUTPATIENT)
Dept: CT IMAGING | Facility: HOSPITAL | Age: 77
Discharge: HOME | End: 2022-10-10
Admitting: SURGERY

## 2022-10-10 DIAGNOSIS — R14.0 ABDOMINAL DISTENSION: ICD-10-CM

## 2022-10-10 DIAGNOSIS — R10.11 RIGHT UPPER QUADRANT ABDOMINAL PAIN: ICD-10-CM

## 2022-10-10 PROCEDURE — 25010000002 IOPAMIDOL 61 % SOLUTION: Performed by: SURGERY

## 2022-10-10 PROCEDURE — 82565 ASSAY OF CREATININE: CPT

## 2022-10-10 PROCEDURE — 74177 CT ABD & PELVIS W/CONTRAST: CPT

## 2022-10-10 PROCEDURE — 0 DIATRIZOATE MEGLUMINE & SODIUM PER 1 ML: Performed by: SURGERY

## 2022-10-10 RX ADMIN — DIATRIZOATE MEGLUMINE AND DIATRIZOATE SODIUM 30 ML: 660; 100 LIQUID ORAL; RECTAL at 16:50

## 2022-10-10 RX ADMIN — IOPAMIDOL 100 ML: 612 INJECTION, SOLUTION INTRAVENOUS at 17:51

## 2022-10-11 ENCOUNTER — TELEPHONE (OUTPATIENT)
Dept: SURGERY | Facility: CLINIC | Age: 77
End: 2022-10-11

## 2022-10-11 LAB — CREAT BLDA-MCNC: 1.9 MG/DL (ref 0.6–1.3)

## 2022-10-11 NOTE — PROGRESS NOTES
I received a call this evening from radiology regarding an outpatient CT ordered on this patient.  It demonstrates a moderate sized perinephric hematoma.  I called and spoke with the patient at home.  He feels about as he normally does and is not having any increased pain.  Really no significant pain in his left flank or on the left side of his belly, still more on the right side.  He has been able to eat.  He has continued to take his Eliquis and has not been feeling lightheaded or dizzy.  He denies any significant bruising.  He did take a fall at home a couple of weeks ago and thinks this is when he may have injured himself.  I have asked him to hold his Eliquis for now.  He is scheduled for colonoscopy next week, I have sent a message to Dr. Pelaez to notify her of this finding.  The patient felt pretty stable tonight and I did not think he needed to come to the emergency department, but he understands that if he were to become lightheaded, have increased abdominal pain or other concerning symptoms he would need to proceed to the emergency department for further evaluation.

## 2022-10-11 NOTE — TELEPHONE ENCOUNTER
Return Mr. Diaz call.  states a doctor provide him with CT scan results and inform him his left  kidney is injured due to his fall. It shows that his kidney is  bleeding and stated he  needs to stop taking his blood thinners. Mr. Diaz is concern and would like to speak with you .  Thank you

## 2022-10-11 NOTE — TELEPHONE ENCOUNTER
Caller: CLARISSA GAVIRIA    Relationship to patient:  SELF    Best call back number: [phone]    Patient is needing: FURTHER CLARIFICATION/ MEDICAL GUIDANCE ON CT SCAN RESULTS AND BLOOD THINNERS 10.10.22. HE WOULD LIKE TO SPEAK WITH SOMEONE BEFORE Thursday 10.13.22

## 2022-11-21 ENCOUNTER — HOSPITAL ENCOUNTER (OUTPATIENT)
Facility: HOSPITAL | Age: 77
Setting detail: HOSPITAL OUTPATIENT SURGERY
Discharge: HOME | End: 2022-11-21
Attending: SURGERY | Admitting: SURGERY
Payer: MEDICARE

## 2022-11-21 ENCOUNTER — ANESTHESIA (OUTPATIENT)
Dept: GASTROENTEROLOGY | Facility: HOSPITAL | Age: 77
End: 2022-11-21
Payer: MEDICARE

## 2022-11-21 ENCOUNTER — ANESTHESIA EVENT (OUTPATIENT)
Dept: GASTROENTEROLOGY | Facility: HOSPITAL | Age: 77
End: 2022-11-21
Payer: MEDICARE

## 2022-11-21 VITALS
HEART RATE: 60 BPM | RESPIRATION RATE: 16 BRPM | BODY MASS INDEX: 35.92 KG/M2 | SYSTOLIC BLOOD PRESSURE: 147 MMHG | DIASTOLIC BLOOD PRESSURE: 78 MMHG | OXYGEN SATURATION: 98 % | HEIGHT: 68 IN | WEIGHT: 237 LBS

## 2022-11-21 DIAGNOSIS — R10.11 RIGHT UPPER QUADRANT ABDOMINAL PAIN: ICD-10-CM

## 2022-11-21 DIAGNOSIS — R14.0 ABDOMINAL DISTENSION: ICD-10-CM

## 2022-11-21 LAB — GLUCOSE BLDC GLUCOMTR-MCNC: 107 MG/DL (ref 70–130)

## 2022-11-21 PROCEDURE — 44389 COLONOSCOPY WITH BIOPSY: CPT | Performed by: SURGERY

## 2022-11-21 PROCEDURE — 25010000002 PROPOFOL 10 MG/ML EMULSION: Performed by: ANESTHESIOLOGY

## 2022-11-21 PROCEDURE — 88305 TISSUE EXAM BY PATHOLOGIST: CPT | Performed by: SURGERY

## 2022-11-21 PROCEDURE — 82962 GLUCOSE BLOOD TEST: CPT

## 2022-11-21 PROCEDURE — 44615 INTESTINAL STRICTUROPLASTY: CPT | Performed by: SURGERY

## 2022-11-21 PROCEDURE — 25010000002 GLUCAGON (RDNA) PER 1 MG: Performed by: SURGERY

## 2022-11-21 RX ORDER — SODIUM CHLORIDE 0.9 % (FLUSH) 0.9 %
1-10 SYRINGE (ML) INJECTION AS NEEDED
Status: DISCONTINUED | OUTPATIENT
Start: 2022-11-21 | End: 2022-11-21 | Stop reason: HOSPADM

## 2022-11-21 RX ORDER — SODIUM CHLORIDE 0.9 % (FLUSH) 0.9 %
10 SYRINGE (ML) INJECTION EVERY 12 HOURS SCHEDULED
Status: DISCONTINUED | OUTPATIENT
Start: 2022-11-21 | End: 2022-11-21 | Stop reason: HOSPADM

## 2022-11-21 RX ORDER — SODIUM CHLORIDE, SODIUM LACTATE, POTASSIUM CHLORIDE, CALCIUM CHLORIDE 600; 310; 30; 20 MG/100ML; MG/100ML; MG/100ML; MG/100ML
1000 INJECTION, SOLUTION INTRAVENOUS CONTINUOUS
Status: DISCONTINUED | OUTPATIENT
Start: 2022-11-21 | End: 2022-11-21 | Stop reason: HOSPADM

## 2022-11-21 RX ORDER — PROPOFOL 10 MG/ML
VIAL (ML) INTRAVENOUS AS NEEDED
Status: DISCONTINUED | OUTPATIENT
Start: 2022-11-21 | End: 2022-11-21 | Stop reason: SURG

## 2022-11-21 RX ORDER — TAMSULOSIN HYDROCHLORIDE 0.4 MG/1
1 CAPSULE ORAL DAILY
COMMUNITY

## 2022-11-21 RX ORDER — LIDOCAINE HYDROCHLORIDE 20 MG/ML
INJECTION, SOLUTION INFILTRATION; PERINEURAL AS NEEDED
Status: DISCONTINUED | OUTPATIENT
Start: 2022-11-21 | End: 2022-11-21 | Stop reason: SURG

## 2022-11-21 RX ADMIN — SODIUM CHLORIDE, POTASSIUM CHLORIDE, SODIUM LACTATE AND CALCIUM CHLORIDE 1000 ML: 600; 310; 30; 20 INJECTION, SOLUTION INTRAVENOUS at 06:29

## 2022-11-21 RX ADMIN — PROPOFOL 300 MG: 10 INJECTION, EMULSION INTRAVENOUS at 07:05

## 2022-11-21 RX ADMIN — LIDOCAINE HYDROCHLORIDE 300 MG: 20 INJECTION, SOLUTION INFILTRATION; PERINEURAL at 07:05

## 2022-11-21 NOTE — H&P
SURGERY  Luis Diaz   1945  11/21/22       Chief Complaint:  Abdominal pain     HPI    Here for c scope with prior history as below.  His CT showed a retroperitoneal bleed which has been watched by Urology with improvement in symptoms.      Mr. Diaz is a very nice 76 y.o. male who presents with abdominal pain.  He has increasing abdominal girth, with right upper quadrant pain, starting below the ribs and radiating straight down to the right lower quadrant.  He does daily irrigations of about 1000cc thru his colostomy, done for rectal cancer in 1996, with very little return.  The pain seems to be associated with eating, is not prolonged.  His appetite is diminished.     He developed a fib march 2022, spinal arthritis which is further complicating walking which was already difficult due to differing lengths of his legs and foot drop due to polio remotely.       I can't find any record of a c scope thru his colostomy, only Madhuri in 2018 attempted to scope his perineal sinus which Dr salas then debrided and closed.  He has had difficulty with dysphagia and had a dilatation with 48 and 52 lord by madhuri [id number].       Medical History        Past Medical History:   Diagnosis Date   • Arthritis     • At risk for transmitting infection to others 04/19/2016   • B12 deficiency     • Bone spur     • BPH (benign prostatic hyperplasia)       FOLLOWED BY DR. SALAS OGLESBY   • Bruises easily     • Cellulitis of left lower extremity 03/10/2020   • Closed displaced comminuted fracture of right patella 10/01/2019     SEEN AT [facility]   • Colon cancer (HCC) 05/19/1996     Operation   • Colostomy and enterostomy malfunction ([facility]) 03/10/2020   • Colostomy in place ([facility])       LEFT   • Coronary artery disease 03/2022     AFiB   • Diabetes mellitus (HCC)       TYPE 2, NIDDM   • Fatigue     • GERD (gastroesophageal reflux disease)     • Hyperlipidemia     • Hypertension     • Ileus (HCC) 10/03/2014     ADMITTED TO [facility]   •  Influenza 01/01/2013   • Influenza A 01/03/2014   • Left leg swelling 02/2020   • OA (osteoarthritis)     • Onychomycosis 05/2016   • Osteopenia     • Polio 1951   • Prostatitis     • PSA elevation 10/2014     FOLLOWED BY DR. SALAS OGLESBY   • Pseudothrombocytopenia       HAS TO HAVE BLOOD DRAWN IN BLUE TUBE   • Pulmonary nodule       PULMONARY GRANULOMA, RIGHT LOWER LOBE, MD HAS FOLLOWED FOR YEARS. NO CHANGES   • Rectal cancer (HCC) 1996     S/P APR WITH colOSTOMY   • Seasonal allergies     • Shingles 04/26/2016     SEEN AT [facility] ER   • Sleep apnea       CPAP   • Thrombocytopenia (HCC)     • Vitamin D deficiency           Surgical History         Past Surgical History:   Procedure Laterality Date   • CARDIOVERSION         6/2022, 5/2022   • CHOLECYSTECTOMY N/A 08/09/2012     LAPAROSCOPIC, DR. EDSON JIMENEZ AT [facility]   • COLON RESECTION N/A 04/1996     APR WITH COLOSTOMY, DR.DAVID TAYLOR   • COLONOSCOPY N/A       DR. LAI RAMIREZ   • ENDOSCOPY N/A 12/18/2018     NO ENDOSCOPIC ABNORMALITY WAS EVIDENT, DILATION OF ESOPHAGUS WITH LITTLEJOHN DILATOR TO 48FR AND 52FR, DR. ADELIA TOLEDO AT [facility]   • FOOT SURGERY Left 07/1958     RUBIO PROCEDURE FOR TENDON REPAIR, GREAT TOE   • INCISION AND DRAINAGE PERIRECTAL ABSCESS N/A 06/04/2020     Procedure: excision/debridement of perineal sinus cavity with drain placement;  Surgeon: Tianna Sagastume MD;  Location: [facility];  Service: General;  Laterality: N/A;   • KNEE ARTHROPLASTY Left       2013   • KNEE CARTILAGE SURGERY Left     • ORIF FINGER / THUMB FRACTURE Left 1985   • PATELLA TENDON REPAIR Right 10/10/2019     Procedure: PATELLA TENDON REPAIR;  Surgeon: Luis Roman MD;  Location: [facility] OR;  Service: Orthopedics   • SIGMOIDOSCOPY N/A 12/18/2018     NO CASTILLO'S POUCH WAS PRESENT, A SMALL SINUS TRACT WITH MATERIAL SUTURE AS A WICK WAS PRESENT, DR. ADELIA TOLEDO AT [facility]   • TONSILLECTOMY AND ADENOIDECTOMY Bilateral 1950     AT [facility]   • TOTAL HIP  ARTHROPLASTY Right 03/06/2014     DR. LUIS ROMAN AT [facility]   • TOTAL KNEE ARTHROPLASTY Right 08/08/2019     Procedure: RIGHT TOTAL KNEE ARTHROPLASTY;  Surgeon: Luis Roman MD;  Location: [facility]  Service: Orthopedics               Family History   Problem Relation Age of Onset   • Emphysema Mother     • Heart disease Mother     • Diabetes Mother     • Heart failure Mother     • Heart attack Mother     • Arthritis Mother     • Osteoarthritis Mother     • Cancer Mother     • Lung cancer Father     • Drug abuse Father     • Hypertension Father     • Alcohol abuse Father     • Prostate cancer Brother     • Colon polyps Brother     • Asthma Brother     • COPD Brother     • Cancer Brother     • Diabetes Maternal Grandfather     • Heart disease Maternal Grandfather     • Diabetes Paternal Grandmother     • No Known Problems Daughter     • No Known Problems Daughter     • Malig Hyperthermia Neg Hx        Social History   Social History            Socioeconomic History   • Marital status:    Tobacco Use   • Smoking status: Never Smoker   • Smokeless tobacco: Never Used   Vaping Use   • Vaping Use: Never used   Substance and Sexual Activity   • Alcohol use: No   • Drug use: No   • Sexual activity: Not Currently       Partners: Female       Birth control/protection: None               Current Outpatient Medications:   •  alfuzosin (UROXATRAL) 10 MG 24 hr tablet, Take 10 mg by mouth Every Night., Disp: , Rfl:   •  amiodarone (PACERONE) 200 MG tablet, Take 200 mg by mouth Daily., Disp: , Rfl:   •  apixaban (ELIQUIS) 5 MG tablet tablet, Take 5 mg by mouth 2 (Two) Times a Day., Disp: , Rfl:   •  atorvastatin (LIPITOR) 10 MG tablet, Take 10 mg by mouth Every Night., Disp: , Rfl:   •  coenzyme Q10 100 MG capsule, Take 100 mg by mouth Daily., Disp: , Rfl:   •  glipizide (GLUCOTROL) 10 MG tablet, Take 10 mg by mouth Daily., Disp: , Rfl:   •  hydroCHLOROthiazide (HYDRODIURIL) 25 MG tablet, Take 25 mg by mouth  "Daily., Disp: , Rfl:   •  JANUVIA 100 MG tablet, Take 100 mg by mouth Daily., Disp: , Rfl:   •  lisinopril (PRINIVIL,ZESTRIL) 20 MG tablet, Take 20 mg by mouth Daily., Disp: , Rfl:   •  metFORMIN ER (GLUCOPHAGE-XR) 750 MG 24 hr tablet, Take 750 mg by mouth Daily With Breakfast., Disp: , Rfl:   •  Multiple Vitamins-Minerals (CENTRUM SILVER 50+MEN PO), Take  by mouth., Disp: , Rfl:   •  naproxen sodium (ALEVE) 220 MG tablet, Take 220 mg by mouth 2 (Two) Times a Day As Needed for Mild Pain., Disp: , Rfl:   •  Omega-3 Fatty Acids (OMEGA 3 PO), Take 2 capsules by mouth Daily., Disp: , Rfl:   •  polyethylene glycol (MIRALAX) packet, Take 17 g by mouth Daily. (Patient taking differently: Take 17 g by mouth Daily As Needed.), Disp: , Rfl:   •  saccharomyces boulardii (Florastor) 250 MG capsule, Take 1 capsule by mouth 2 (Two) Times a Day. (Patient taking differently: Take 250 mg by mouth Daily As Needed.), Disp: 60 capsule, Rfl: 2  •  Vitamin D, Ergocalciferol, 2000 units capsule, Take 1 tablet by mouth Daily., Disp: , Rfl:            Allergies   Allergen Reactions   • Toradol [Ketorolac Tromethamine] Dizziness       SYNCOPE, TUNNEL VISION         PHYSICAL EXAM:     Ht 172.7 cm (68\")   Wt 113 kg (250 lb)   BMI 38.01 kg/m²   Body mass index is 38.01 kg/m².     Constitutional: well developed, well nourished, appears slightly unhealthy, stated age, a little pale  ENMT: Hearing diminished, neck without masses  CVS: RRR, no murmur  Respiratory: CTA, normal respiratory effort   Gastrointestinal: abdomen very distended, tight, abdominal hernia not detected, ostomy left lower quadrant  Musculoskeletal: gait a little unsteady, slowed, muscle mass normal  Neurological: awake and alert, seems to have reasonable capacity for understanding for medical decision making  Psychiatric: appears to have reasonable judgement, pleasant     Radiographic/Lab Findings:   ALT 76, AST 54, hgb 15.6 in March, CT 2020 without bowel abnormalities,  My " review no hernia and no dilatation.     IMPRESSION:  • Increasing abdominal girth  • Right upper quadrant pain, prior lap chol Benigno  • History of rectal cancer  • Elevated LFT's     PLAN:  • CT to evaluate for colitis, tho i think this is unlikely.  Also evaluate for ascites, or distension of the cecum.  This is what i am most concerned about with his diminishing return of irrigation.  • C scope to evaluate for stricture, cancer, twist, polyp.   • Hold eliquis 2 days prior  • Hold lisinopril day of scope.

## 2022-11-21 NOTE — ANESTHESIA POSTPROCEDURE EVALUATION
"Patient: Luis Diaz    Procedure Summary     Date: 11/21/22 Room / Location:  [facility]    Anesthesia Start: 0701 Anesthesia Stop: 0730    Procedure: COLONOSCOPY THROUGH OSTOMY TO CECUM WITH COLD BX POLYPECTOMY Diagnosis:       Abdominal distension      Right upper quadrant abdominal pain      (Abdominal distension [R14.0])      (Right upper quadrant abdominal pain [R10.11])    Surgeons: Jacqueline Pelaez MD Provider: Shelton Harley MD    Anesthesia Type: MAC ASA Status: 3          Anesthesia Type: MAC    Vitals  Vitals Value Taken Time   /78 11/21/22 0749   Temp     Pulse 60 11/21/22 0749   Resp 16 11/21/22 0749   SpO2 98 % 11/21/22 0749           Post Anesthesia Care and Evaluation    Patient location during evaluation: bedside  Patient participation: complete - patient participated  Level of consciousness: awake and alert  Pain management: adequate    Airway patency: patent  Anesthetic complications: No anesthetic complications    Cardiovascular status: acceptable  Respiratory status: acceptable  Hydration status: acceptable    Comments: /78 (BP Location: Left arm, Patient Position: Lying)   Pulse 60   Resp 16   Ht 172.7 cm (68\")   Wt 108 kg (237 lb)   SpO2 98%   BMI 36.04 kg/m²       "

## 2022-11-21 NOTE — ANESTHESIA PREPROCEDURE EVALUATION
Anesthesia Evaluation     Patient summary reviewed and Nursing notes reviewed   no history of anesthetic complications:  NPO Solid Status: > 8 hours  NPO Liquid Status: > 2 hours           Airway   Mallampati: II  TM distance: >3 FB  Neck ROM: full  Large neck circumference  Comment: Large tongue  Dental - normal exam     Pulmonary    (+) sleep apnea on CPAP,   (-) asthma, shortness of breath, recent URI, not a smoker    ROS comment: Lung nodule  Cardiovascular   Exercise tolerance: good (4-7 METS)    (+) hypertension,   (-) dysrhythmias, angina, hyperlipidemia    ROS comment: Min ST elevation ant leads    Neuro/Psych- negative ROS  (-) seizures, CVA, dizziness/light headedness, syncope  GI/Hepatic/Renal/Endo    (+) obesity,  GERD,  diabetes mellitus,     Musculoskeletal (-) negative ROS    Abdominal    Substance History - negative use  (-) alcohol use, drug use     OB/GYN negative ob/gyn ROS         Other   blood dyscrasia (pseudothrombocytopenia),                       Anesthesia Plan    ASA 3     MAC     intravenous induction     Anesthetic plan, risks, benefits, and alternatives have been provided, discussed and informed consent has been obtained with: patient.

## 2022-11-21 NOTE — OP NOTE
Colonoscopy Procedure Note  Luis Diaz  1945  Date of Procedure: 11/21/22    Pre-operative Diagnosis:    • History of rectal cancer remotely, S/P APR  • Elevated LFT's    Post-operative Diagnosis:  · Mucosal abnormality cecum (4 mm) and descending colon (6 mm).  Removed via cold biopsy forceps  · Ostomy stricture    Procedure: Colonoscopy with polypectomy     Findings/Treatments:   · Stricture at ostomy site, digitally dilated mildly and then it accepted a pediatric scope       Recommendations:   · Colonoscopy in 5 years, based on pathology.  The office will call within the next  3-10 days with a final recommendation.  · Keep a copy of the photographs of the procedure given to you today for possible need for reference in the future.      Surgeon: Benigno    Anesthetic: MAC per Shelton Harley MD    Scope Withdrawal Time:  9 minutes  11 seconds    Procedure Details     MAC anesthesia was induced.  The 180 Colonoscopy was inserted into the ostomy site, but couldn't be advanced, was dilated, and then a pediatric scope advanced to the cecum, with relative ease,  without need for pressure, lift, or turning.   Cecum was identified by the appendiceal orifice and the ileocecal valve and photographed for documentation.      Prep quality was very good.  A careful inspection was made as the scope was withdrawn, including a retroflexed view of the rectum; there was no suggestion of presence of angiodysplasias, or colitis, but there were a few polyps or mucosal abnormality, vague, and a single diverticula, with noted interventions.     Retroflexion in the rectum revealed no abnormalities.      Jacqueline Pelaez MD  11/21/22

## 2022-11-22 LAB
LAB AP CASE REPORT: NORMAL
PATH REPORT.FINAL DX SPEC: NORMAL
PATH REPORT.GROSS SPEC: NORMAL

## 2022-11-22 NOTE — PROGRESS NOTES
Oralia (endoscopy liaison),    Please call patient tto inform them of these findings and recommendations and ensure that any pamphlets that were to be given to the patient at the hospital were received.     Please make sure a letter is sent to the patient, recall method entered into the computer and the HM (Health Maintenance) tab updated as far as need for endoscopy follow up.    Thanks  Dr Pelaez    Colonoscopy Procedure Note  Luis SANDERS Joe  1945  Date of Procedure: 11/21/22     Pre-operative Diagnosis:    · History of rectal cancer remotely, S/P APR  · Elevated LFT's     Post-operative Diagnosis:  · Mucosal abnormality cecum (4 mm) and descending colon (6 mm).  Removed via cold biopsy forceps;  HYPERPLASTIC POLYP  · Ostomy stricture     Procedure: Colonoscopy with polypectomy      Findings/Treatments:   · Stricture at ostomy site, digitally dilated mildly and then it accepted a pediatric scope    Recommendations:   · Colonoscopy in 5 years, based on pathology.  The office will call within the next  3-10 days with a final recommendation.;  YES  · Keep a copy of the photographs of the procedure given to you today for possible need for reference in the future.

## 2022-11-28 ENCOUNTER — TELEPHONE (OUTPATIENT)
Dept: SURGERY | Facility: CLINIC | Age: 77
End: 2022-11-28

## 2022-11-28 NOTE — TELEPHONE ENCOUNTER
----- Message from Jacqueline Pelaez MD sent at 11/22/2022  3:01 PM EST -----  Oralia (endoscopy liaison),    Please call patient tto inform them of these findings and recommendations and ensure that any pamphlets that were to be given to the patient at the hospital were received.     Please make sure a letter is sent to the patient, recall method entered into the computer and the HM (Health Maintenance) tab updated as far as need for endoscopy follow up.    Thanks  Dr Pelaez    Colonoscopy Procedure Note  Luis Diaz  1945  Date of Procedure: 11/21/22     Pre-operative Diagnosis:    · History of rectal cancer remotely, S/P APR  · Elevated LFT's     Post-operative Diagnosis:  · Mucosal abnormality cecum (4 mm) and descending colon (6 mm).  Removed via cold biopsy forceps;  HYPERPLASTIC POLYP  · Ostomy stricture     Procedure: Colonoscopy with polypectomy      Findings/Treatments:   · Stricture at ostomy site, digitally dilated mildly and then it accepted a pediatric scope                                        Recommendations:   · Colonoscopy in 5 years, based on pathology.  The office will call within the next  3-10 days with a final recommendation.;  YES  · Keep a copy of the photographs of the procedure given to you today for possible need for reference in the future.

## 2023-04-06 ENCOUNTER — APPOINTMENT (OUTPATIENT)
Dept: CT IMAGING | Facility: HOSPITAL | Age: 78
End: 2023-04-06
Payer: MEDICARE

## 2023-04-06 ENCOUNTER — HOSPITAL ENCOUNTER (OUTPATIENT)
Facility: HOSPITAL | Age: 78
Setting detail: OBSERVATION
LOS: 1 days | Discharge: HOME | End: 2023-04-08
Attending: EMERGENCY MEDICINE | Admitting: HOSPITALIST
Payer: MEDICARE

## 2023-04-06 DIAGNOSIS — R10.9 ACUTE ABDOMINAL PAIN: Primary | ICD-10-CM

## 2023-04-06 DIAGNOSIS — D72.829 LEUKOCYTOSIS, UNSPECIFIED TYPE: ICD-10-CM

## 2023-04-06 DIAGNOSIS — K56.7 ILEUS: ICD-10-CM

## 2023-04-06 DIAGNOSIS — N17.9 ACUTE KIDNEY INJURY: ICD-10-CM

## 2023-04-06 LAB
ALBUMIN SERPL-MCNC: 3.8 G/DL (ref 3.5–5.2)
ALBUMIN/GLOB SERPL: 1.6 G/DL
ALP SERPL-CCNC: 120 U/L (ref 39–117)
ALT SERPL W P-5'-P-CCNC: 29 U/L (ref 1–41)
ANION GAP SERPL CALCULATED.3IONS-SCNC: 10 MMOL/L (ref 5–15)
AST SERPL-CCNC: 25 U/L (ref 1–40)
BACTERIA UR QL AUTO: NORMAL /HPF
BASOPHILS # BLD AUTO: 0.07 10*3/MM3 (ref 0–0.2)
BASOPHILS NFR BLD AUTO: 0.3 % (ref 0–1.5)
BILIRUB SERPL-MCNC: 0.5 MG/DL (ref 0–1.2)
BILIRUB UR QL STRIP: NEGATIVE
BUN SERPL-MCNC: 32 MG/DL (ref 8–23)
BUN/CREAT SERPL: 12.6 (ref 7–25)
CALCIUM SPEC-SCNC: 9.7 MG/DL (ref 8.6–10.5)
CHLORIDE SERPL-SCNC: 101 MMOL/L (ref 98–107)
CLARITY UR: CLEAR
CO2 SERPL-SCNC: 25 MMOL/L (ref 22–29)
COLOR UR: ABNORMAL
CREAT SERPL-MCNC: 2.54 MG/DL (ref 0.76–1.27)
DEPRECATED RDW RBC AUTO: 43.5 FL (ref 37–54)
EGFRCR SERPLBLD CKD-EPI 2021: 25.5 ML/MIN/1.73
EOSINOPHIL # BLD AUTO: 0.01 10*3/MM3 (ref 0–0.4)
EOSINOPHIL NFR BLD AUTO: 0 % (ref 0.3–6.2)
ERYTHROCYTE [DISTWIDTH] IN BLOOD BY AUTOMATED COUNT: 13.3 % (ref 12.3–15.4)
GLOBULIN UR ELPH-MCNC: 2.4 GM/DL
GLUCOSE BLDC GLUCOMTR-MCNC: 141 MG/DL (ref 70–130)
GLUCOSE BLDC GLUCOMTR-MCNC: 183 MG/DL (ref 70–130)
GLUCOSE SERPL-MCNC: 239 MG/DL (ref 65–99)
GLUCOSE UR STRIP-MCNC: NEGATIVE MG/DL
HCT VFR BLD AUTO: 45.2 % (ref 37.5–51)
HGB BLD-MCNC: 14.6 G/DL (ref 13–17.7)
HGB UR QL STRIP.AUTO: NEGATIVE
HYALINE CASTS UR QL AUTO: NORMAL /LPF
IMM GRANULOCYTES # BLD AUTO: 0.35 10*3/MM3 (ref 0–0.05)
IMM GRANULOCYTES NFR BLD AUTO: 1.7 % (ref 0–0.5)
KETONES UR QL STRIP: ABNORMAL
LEUKOCYTE ESTERASE UR QL STRIP.AUTO: ABNORMAL
LIPASE SERPL-CCNC: 69 U/L (ref 13–60)
LYMPHOCYTES # BLD AUTO: 1.3 10*3/MM3 (ref 0.7–3.1)
LYMPHOCYTES NFR BLD AUTO: 6.4 % (ref 19.6–45.3)
MCH RBC QN AUTO: 28.7 PG (ref 26.6–33)
MCHC RBC AUTO-ENTMCNC: 32.3 G/DL (ref 31.5–35.7)
MCV RBC AUTO: 88.8 FL (ref 79–97)
MONOCYTES # BLD AUTO: 1.41 10*3/MM3 (ref 0.1–0.9)
MONOCYTES NFR BLD AUTO: 6.9 % (ref 5–12)
NEUTROPHILS NFR BLD AUTO: 17.32 10*3/MM3 (ref 1.7–7)
NEUTROPHILS NFR BLD AUTO: 84.7 % (ref 42.7–76)
NITRITE UR QL STRIP: NEGATIVE
NRBC BLD AUTO-RTO: 0 /100 WBC (ref 0–0.2)
PH UR STRIP.AUTO: <=5 [PH] (ref 5–8)
PLATELET # BLD AUTO: 248 10*3/MM3 (ref 140–450)
PMV BLD AUTO: 10.5 FL (ref 6–12)
POTASSIUM SERPL-SCNC: 4.7 MMOL/L (ref 3.5–5.2)
PROT SERPL-MCNC: 6.2 G/DL (ref 6–8.5)
PROT UR QL STRIP: NEGATIVE
RBC # BLD AUTO: 5.09 10*6/MM3 (ref 4.14–5.8)
RBC # UR STRIP: NORMAL /HPF
REF LAB TEST METHOD: NORMAL
SODIUM SERPL-SCNC: 136 MMOL/L (ref 136–145)
SP GR UR STRIP: 1.02 (ref 1–1.03)
SQUAMOUS #/AREA URNS HPF: NORMAL /HPF
UROBILINOGEN UR QL STRIP: ABNORMAL
WBC # UR STRIP: NORMAL /HPF
WBC NRBC COR # BLD: 20.46 10*3/MM3 (ref 3.4–10.8)

## 2023-04-06 PROCEDURE — 99221 1ST HOSP IP/OBS SF/LOW 40: CPT | Performed by: SURGERY

## 2023-04-06 PROCEDURE — 74176 CT ABD & PELVIS W/O CONTRAST: CPT

## 2023-04-06 PROCEDURE — 25010000002 MORPHINE PER 10 MG: Performed by: EMERGENCY MEDICINE

## 2023-04-06 PROCEDURE — 96375 TX/PRO/DX INJ NEW DRUG ADDON: CPT

## 2023-04-06 PROCEDURE — 63710000001 INSULIN LISPRO (HUMAN) PER 5 UNITS: Performed by: HOSPITALIST

## 2023-04-06 PROCEDURE — 83690 ASSAY OF LIPASE: CPT | Performed by: EMERGENCY MEDICINE

## 2023-04-06 PROCEDURE — 81001 URINALYSIS AUTO W/SCOPE: CPT | Performed by: EMERGENCY MEDICINE

## 2023-04-06 PROCEDURE — 85025 COMPLETE CBC W/AUTO DIFF WBC: CPT | Performed by: EMERGENCY MEDICINE

## 2023-04-06 PROCEDURE — 80053 COMPREHEN METABOLIC PANEL: CPT | Performed by: EMERGENCY MEDICINE

## 2023-04-06 PROCEDURE — 82962 GLUCOSE BLOOD TEST: CPT

## 2023-04-06 PROCEDURE — 96374 THER/PROPH/DIAG INJ IV PUSH: CPT

## 2023-04-06 PROCEDURE — 99285 EMERGENCY DEPT VISIT HI MDM: CPT

## 2023-04-06 PROCEDURE — 25010000002 ONDANSETRON PER 1 MG: Performed by: EMERGENCY MEDICINE

## 2023-04-06 RX ORDER — INSULIN LISPRO 100 [IU]/ML
0-7 INJECTION, SOLUTION INTRAVENOUS; SUBCUTANEOUS
Status: DISCONTINUED | OUTPATIENT
Start: 2023-04-06 | End: 2023-04-08 | Stop reason: HOSPADM

## 2023-04-06 RX ORDER — AMLODIPINE BESYLATE 5 MG/1
5 TABLET ORAL DAILY
COMMUNITY
End: 2023-04-08 | Stop reason: HOSPADM

## 2023-04-06 RX ORDER — MORPHINE SULFATE 2 MG/ML
4 INJECTION, SOLUTION INTRAMUSCULAR; INTRAVENOUS ONCE
Status: COMPLETED | OUTPATIENT
Start: 2023-04-06 | End: 2023-04-06

## 2023-04-06 RX ORDER — ONDANSETRON 2 MG/ML
4 INJECTION INTRAMUSCULAR; INTRAVENOUS EVERY 6 HOURS PRN
Status: DISCONTINUED | OUTPATIENT
Start: 2023-04-06 | End: 2023-04-08

## 2023-04-06 RX ORDER — NICOTINE POLACRILEX 4 MG
15 LOZENGE BUCCAL
Status: DISCONTINUED | OUTPATIENT
Start: 2023-04-06 | End: 2023-04-07

## 2023-04-06 RX ORDER — PANTOPRAZOLE SODIUM 40 MG/10ML
40 INJECTION, POWDER, LYOPHILIZED, FOR SOLUTION INTRAVENOUS EVERY 12 HOURS SCHEDULED
Status: DISCONTINUED | OUTPATIENT
Start: 2023-04-06 | End: 2023-04-07

## 2023-04-06 RX ORDER — SODIUM CHLORIDE 9 MG/ML
125 INJECTION, SOLUTION INTRAVENOUS CONTINUOUS
Status: DISCONTINUED | OUTPATIENT
Start: 2023-04-06 | End: 2023-04-07

## 2023-04-06 RX ORDER — DEXTROSE MONOHYDRATE 25 G/50ML
25 INJECTION, SOLUTION INTRAVENOUS
Status: DISCONTINUED | OUTPATIENT
Start: 2023-04-06 | End: 2023-04-07

## 2023-04-06 RX ORDER — SODIUM CHLORIDE 9 MG/ML
75 INJECTION, SOLUTION INTRAVENOUS CONTINUOUS
Status: DISCONTINUED | OUTPATIENT
Start: 2023-04-06 | End: 2023-04-08

## 2023-04-06 RX ORDER — IBUPROFEN 600 MG/1
1 TABLET ORAL
Status: DISCONTINUED | OUTPATIENT
Start: 2023-04-06 | End: 2023-04-07

## 2023-04-06 RX ORDER — ONDANSETRON 2 MG/ML
4 INJECTION INTRAMUSCULAR; INTRAVENOUS ONCE
Status: COMPLETED | OUTPATIENT
Start: 2023-04-06 | End: 2023-04-06

## 2023-04-06 RX ORDER — MORPHINE SULFATE 2 MG/ML
2 INJECTION, SOLUTION INTRAMUSCULAR; INTRAVENOUS EVERY 4 HOURS PRN
Status: DISCONTINUED | OUTPATIENT
Start: 2023-04-06 | End: 2023-04-08

## 2023-04-06 RX ADMIN — PANTOPRAZOLE SODIUM 40 MG: 40 INJECTION, POWDER, FOR SOLUTION INTRAVENOUS at 20:48

## 2023-04-06 RX ADMIN — INSULIN LISPRO 2 UNITS: 100 INJECTION, SOLUTION INTRAVENOUS; SUBCUTANEOUS at 18:18

## 2023-04-06 RX ADMIN — SODIUM CHLORIDE 500 ML: 9 INJECTION, SOLUTION INTRAVENOUS at 13:38

## 2023-04-06 RX ADMIN — ONDANSETRON 4 MG: 2 INJECTION INTRAMUSCULAR; INTRAVENOUS at 13:39

## 2023-04-06 RX ADMIN — MORPHINE SULFATE 4 MG: 2 INJECTION, SOLUTION INTRAMUSCULAR; INTRAVENOUS at 13:39

## 2023-04-06 RX ADMIN — SODIUM CHLORIDE 75 ML/HR: 9 INJECTION, SOLUTION INTRAVENOUS at 18:19

## 2023-04-06 RX ADMIN — SODIUM CHLORIDE 125 ML/HR: 9 INJECTION, SOLUTION INTRAVENOUS at 16:01

## 2023-04-06 NOTE — ED PROVIDER NOTES
EMERGENCY DEPARTMENT ENCOUNTER    Room Number:  P884/1  Date seen:  4/6/2023  PCP: Riddhi Ruiz MD  Historian: Patient      HPI:  Chief Complaint: Abdominal    Context: Luis Diaz is a 77 y.o. male who presents to the ED c/o severe abdominal pain after trying to irrigate his ostomy today.  The patient has a history of A-fib and is on Eliquis.  The patient states he had a colostomy placed by Dr. Hawley years ago.  He states he was taught to daily flushes ostomy.  He states he normally does that without difficulty however today at approximately 730 he flushed the saline in and had a no return of saline or stool.  He states since then his abdomen has become progressively more distended and tender.  He states he has had nausea but no vomiting.  He denies fevers or chills.      PAST MEDICAL HISTORY  Active Ambulatory Problems     Diagnosis Date Noted   • Esophageal dysphagia 12/13/2018   • Heartburn 12/13/2018   • History of colon cancer 12/14/2018   • OA (osteoarthritis) of knee 08/08/2019   • Vitamin D deficiency 07/01/2013   • Thrombocytopenia 05/01/2020   • Status post colostomy 05/01/2020   • Sleep apnea 05/01/2020   • Pulmonary nodule 05/01/2020   • Poliomyelitis 05/01/2020   • Injury of leg, left 05/01/2020   • Cellulitis of left lower extremity 03/10/2020   • Ileus 10/29/2014   • History of osteopenia 07/01/2013   • Elevated PSA 10/29/2014   • Diabetes mellitus type 2 in obese 08/07/2019   • Colostomy and enterostomy malfunction 03/10/2020   • Cobalamin deficiency 07/01/2013   • Chronic hip pain 07/01/2013   • BPH (benign prostatic hyperplasia) 05/01/2020   • Bone spur 07/01/2013   • At risk for transmitting infection to others 04/19/2016   • Perineal sinus 05/11/2020   • Abdominal distension 10/03/2022   • Right upper quadrant abdominal pain 10/03/2022     Resolved Ambulatory Problems     Diagnosis Date Noted   • No Resolved Ambulatory Problems     Past Medical History:   Diagnosis Date   • Arthritis     • Atrial fibrillation    • B12 deficiency    • Bruises easily    • Closed displaced comminuted fracture of right patella 10/01/2019   • Colon cancer 05/19/1996   • Colostomy in place    • Coronary artery disease 03/2022   • Diabetes mellitus    • Fatigue    • GERD (gastroesophageal reflux disease)    • Hyperlipidemia    • Hypertension    • Influenza 01/01/2013   • Influenza A 01/03/2014   • Left leg swelling 02/2020   • OA (osteoarthritis)    • Onychomycosis 05/2016   • Osteopenia    • Polio 1951   • Prostatitis    • PSA elevation 10/2014   • Pseudothrombocytopenia    • Rectal cancer 1996   • Seasonal allergies    • Shingles 04/26/2016         REVIEW OF SYSTEMS  All systems reviewed and negative except for those discussed in HPI.       PAST SURGICAL HISTORY  Past Surgical History:   Procedure Laterality Date   • CARDIOVERSION      6/2022, 5/2022   • CHOLECYSTECTOMY N/A 08/09/2012    LAPAROSCOPIC, DR. JACQUELINE PELAEZ AT [facility]   • COLON RESECTION N/A 04/1996    APR WITH COLOSTOMY, DR.DAVID TAYLOR   • COLONOSCOPY N/A     DR. LAI RAMIREZ   • COLONOSCOPY N/A 11/21/2022    Procedure: COLONOSCOPY THROUGH OSTOMY TO CECUM WITH COLD BX POLYPECTOMY;  Surgeon: Jacqueline Pelaez MD;  Location: [facility] ENDOSCOPY;  Service: General;  Laterality: N/A;  HX RECTAL CA  --CECAL MUCOSAL ABNORMALITY, POLYP    • ENDOSCOPY N/A 12/18/2018    NO ENDOSCOPIC ABNORMALITY WAS EVIDENT, DILATION OF ESOPHAGUS WITH LITTLEJOHN DILATOR TO 48FR AND 52FR, DR. ADELIA TOLEDO AT [facility]   • FOOT SURGERY Left 07/1958    RUBIO PROCEDURE FOR TENDON REPAIR, GREAT TOE   • INCISION AND DRAINAGE PERIRECTAL ABSCESS N/A 06/04/2020    Procedure: excision/debridement of perineal sinus cavity with drain placement;  Surgeon: Tianna Sagastume MD;  Location: [facility] MAIN OR;  Service: General;  Laterality: N/A;   • KNEE ARTHROPLASTY Left     2013   • KNEE CARTILAGE SURGERY Left    • ORIF FINGER / THUMB FRACTURE Left 1985   • PATELLA TENDON REPAIR Right 10/10/2019    Procedure:  PATELLA TENDON REPAIR;  Surgeon: Luis Roman MD;  Location: [facility] MAIN OR;  Service: Orthopedics   • SIGMOIDOSCOPY N/A 12/18/2018    NO CASTILLO'S POUCH WAS PRESENT, A SMALL SINUS TRACT WITH MATERIAL SUTURE AS A WICK WAS PRESENT, DR. ADELIA TOLEDO AT [facility]   • TONSILLECTOMY AND ADENOIDECTOMY Bilateral 1950    AT [facility]   • TOTAL HIP ARTHROPLASTY Right 03/06/2014    DR. LUIS ROMAN AT [facility]   • TOTAL KNEE ARTHROPLASTY Right 08/08/2019    Procedure: RIGHT TOTAL KNEE ARTHROPLASTY;  Surgeon: Luis Roman MD;  Location: [facility] MAIN OR;  Service: Orthopedics         FAMILY HISTORY  Family History   Problem Relation Age of Onset   • Emphysema Mother    • Heart disease Mother    • Diabetes Mother    • Heart failure Mother    • Heart attack Mother    • Arthritis Mother    • Osteoarthritis Mother    • Cancer Mother    • Lung cancer Father    • Drug abuse Father    • Hypertension Father    • Alcohol abuse Father    • Prostate cancer Brother    • Colon polyps Brother    • Asthma Brother    • COPD Brother    • Cancer Brother    • Diabetes Maternal Grandfather    • Heart disease Maternal Grandfather    • Diabetes Paternal Grandmother    • No Known Problems Daughter    • No Known Problems Daughter    • Malig Hyperthermia Neg Hx          SOCIAL HISTORY  Social History     Socioeconomic History   • Marital status:    Tobacco Use   • Smoking status: Never   • Smokeless tobacco: Never   Vaping Use   • Vaping Use: Never used   Substance and Sexual Activity   • Alcohol use: No   • Drug use: No   • Sexual activity: Not Currently     Partners: Female     Birth control/protection: None         ALLERGIES  Toradol [ketorolac tromethamine]      PHYSICAL EXAM  ED Triage Vitals [04/06/23 1302]   Temp Heart Rate Resp BP SpO2   97.4 °F (36.3 °C) 68 18 -- 97 %      Temp src Heart Rate Source Patient Position BP Location FiO2 (%)   Tympanic -- -- -- --       Physical Exam      GENERAL: 77-year-old male in mild  distress  HENT: NCAT: nares patent: Neck supple  EYES: no scleral icterus  CV: regular rhythm, normal rate  RESPIRATORY: normal effort  ABDOMEN: Distended with mild diffuse tenderness and markedly hypoactive bowel sounds: Ostomy in the left lower quadrant has no stool output  MUSCULOSKELETAL: no deformity  NEURO: alert with nonfocal neuro exam  PSYCH:  calm, cooperative  SKIN: warm, dry    Vital signs and nursing notes reviewed.    PPE pt does not present with symptoms for COVID19; however, I was wearing a mask and goggles throughout all patient interaction.    LAB RESULTS  Recent Results (from the past 24 hour(s))   Comprehensive Metabolic Panel    Collection Time: 04/06/23  1:36 PM    Specimen: Blood   Result Value Ref Range    Glucose 239 (H) 65 - 99 mg/dL    BUN 32 (H) 8 - 23 mg/dL    Creatinine 2.54 (H) 0.76 - 1.27 mg/dL    Sodium 136 136 - 145 mmol/L    Potassium 4.7 3.5 - 5.2 mmol/L    Chloride 101 98 - 107 mmol/L    CO2 25.0 22.0 - 29.0 mmol/L    Calcium 9.7 8.6 - 10.5 mg/dL    Total Protein 6.2 6.0 - 8.5 g/dL    Albumin 3.8 3.5 - 5.2 g/dL    ALT (SGPT) 29 1 - 41 U/L    AST (SGOT) 25 1 - 40 U/L    Alkaline Phosphatase 120 (H) 39 - 117 U/L    Total Bilirubin 0.5 0.0 - 1.2 mg/dL    Globulin 2.4 gm/dL    A/G Ratio 1.6 g/dL    BUN/Creatinine Ratio 12.6 7.0 - 25.0    Anion Gap 10.0 5.0 - 15.0 mmol/L    eGFR 25.5 (L) >60.0 mL/min/1.73   Lipase    Collection Time: 04/06/23  1:36 PM    Specimen: Blood   Result Value Ref Range    Lipase 69 (H) 13 - 60 U/L   Urinalysis With Microscopic If Indicated (No Culture) - Urine, Clean Catch    Collection Time: 04/06/23  1:36 PM    Specimen: Urine, Clean Catch   Result Value Ref Range    Color, UA Dark Yellow (A) Yellow, Straw    Appearance, UA Clear Clear    pH, UA <=5.0 5.0 - 8.0    Specific Gravity, UA 1.020 1.005 - 1.030    Glucose, UA Negative Negative    Ketones, UA Trace (A) Negative    Bilirubin, UA Negative Negative    Blood, UA Negative Negative    Protein, UA  Negative Negative    Leuk Esterase, UA Trace (A) Negative    Nitrite, UA Negative Negative    Urobilinogen, UA 1.0 E.U./dL 0.2 - 1.0 E.U./dL   CBC Auto Differential    Collection Time: 04/06/23  1:36 PM    Specimen: Blood   Result Value Ref Range    WBC 20.46 (H) 3.40 - 10.80 10*3/mm3    RBC 5.09 4.14 - 5.80 10*6/mm3    Hemoglobin 14.6 13.0 - 17.7 g/dL    Hematocrit 45.2 37.5 - 51.0 %    MCV 88.8 79.0 - 97.0 fL    MCH 28.7 26.6 - 33.0 pg    MCHC 32.3 31.5 - 35.7 g/dL    RDW 13.3 12.3 - 15.4 %    RDW-SD 43.5 37.0 - 54.0 fl    MPV 10.5 6.0 - 12.0 fL    Platelets 248 140 - 450 10*3/mm3    Neutrophil % 84.7 (H) 42.7 - 76.0 %    Lymphocyte % 6.4 (L) 19.6 - 45.3 %    Monocyte % 6.9 5.0 - 12.0 %    Eosinophil % 0.0 (L) 0.3 - 6.2 %    Basophil % 0.3 0.0 - 1.5 %    Immature Grans % 1.7 (H) 0.0 - 0.5 %    Neutrophils, Absolute 17.32 (H) 1.70 - 7.00 10*3/mm3    Lymphocytes, Absolute 1.30 0.70 - 3.10 10*3/mm3    Monocytes, Absolute 1.41 (H) 0.10 - 0.90 10*3/mm3    Eosinophils, Absolute 0.01 0.00 - 0.40 10*3/mm3    Basophils, Absolute 0.07 0.00 - 0.20 10*3/mm3    Immature Grans, Absolute 0.35 (H) 0.00 - 0.05 10*3/mm3    nRBC 0.0 0.0 - 0.2 /100 WBC   Urinalysis, Microscopic Only - Urine, Clean Catch    Collection Time: 04/06/23  1:36 PM    Specimen: Urine, Clean Catch   Result Value Ref Range    RBC, UA None Seen None Seen, 0-2 /HPF    WBC, UA 0-2 None Seen, 0-2 /HPF    Bacteria, UA None Seen None Seen /HPF    Squamous Epithelial Cells, UA 0-2 None Seen, 0-2 /HPF    Hyaline Casts, UA None Seen None Seen /LPF    Methodology Manual Light Microscopy        Ordered the above labs and reviewed the results.        RADIOLOGY  CT Abdomen Pelvis Without Contrast    Result Date: 4/6/2023  CT ABDOMEN AND PELVIS WITHOUT IV CONTRAST  HISTORY: Lower abdominal pain after irrigating ostomy. Distended abdomen with decreased output. Atrial fibrillation on anticoagulation. Left renal subcapsular hematoma. Colon cancer.  TECHNIQUE: Radiation dose  reduction techniques were utilized, including automated exposure control and exposure modulation based on body size. 3 mm images were obtained through the abdomen and pelvis without the administration of IV contrast. Lack of IV contrast limits evaluation of solid, visceral, and vascular structures. Sensitivity for underlying lesions and infection decreased.  COMPARISON: 10/10/2022, report 07/27/2022  FINDINGS:  LOWER CHEST: Stable cardiomegaly. Atherosclerosis including coronary artery calcifications. Bibasilar atelectasis and/or scarring.  ABDOMEN:  Liver/Biliary Tract: Within normal limits. Cholecystectomy with stable central biliary ductal dilation.  Spleen: Within normal limits. Splenules.  Pancreas: Fatty infiltration.  Adrenals: Stable thickening left greater than right.  Kidneys:  The previously described subcapsular hematoma about the posterior interpolar left kidney is not as well-visualized without IV contrast but approximates 1.9 x 5.4 cm (series 2 image 57) previously 5.6 x 8.8 cm. This collection is now predominantly low attenuation (7 Hounsfield units) with persistent but improving perinephric stranding. Please correlate for page kidney. No new focal fluid collections. No hydroureteronephrosis or radiopaque obstructing stones. Noncontrast imaging of the right kidneys within normal limits.  Bowel:  Circumferential thickening of the distal esophagus similar to the prior. The stomach is moderately distended with fluid. Mildly prominent but not abnormally dilated fluid-filled loops of small bowel. Left lower quadrant colostomy with Vega's pouch for colon cancer. Recommend correlation with tumor markers and endoscopy. Mildly dilated fluid-filled loops of proximal colon measuring up to 6.5 cm at the mid transverse level. Scattered colonic diverticula with mild stranding about the distal descending colon (image 94). Presacral soft tissue thickening/nodular enhancement similar to the prior favoring  granulation tissue/scarring.  Vasculature:    Scattered calcific atherosclerosis abdominal aorta and branch vessels.  Lymph Nodes:  Scattered subcentimeter nodes and index aortocaval node is 6 mm in short axis, unchanged.                             PELVIS:                                 Pelvic organs: Evaluation is limited by streak artifact from right hip prosthesis. Prostatomegaly protruding into the bladder which is incompletely distended. Fat-containing inguinal hernias right, greater than left. Basilar calcifications/phleboliths..  Abdominal/Pelvic Wall: Fat-containing umbilical hernia. Postsurgical scarring.  BONES: Status post right hip arthroplasty. Multilevel degenerative changes thoracolumbar spine and pelvis..       1. Status post partial colectomy with a left lower quadrant colostomy and Vega's pouch. Mildly dilated fluid and air-filled loops of proximal colon measuring up to 6.5 cm favoring a colonic ileus/colitis. Recommend conservative follow-up or surgical consultation if there is concern for developing obstruction. 2. Colonic diverticulosis with mild uncomplicated sigmoid diverticulitis. 3. Interval decrease in size of the subcapsular hematoma along the posterior interpolar left kidney approximating 1.9 x 5.4 cm (previously 5.6 x 8.8 cm) with persistent scarring in the left perinephric space. 4. Please see above for additional findings/recommendations.  This report was finalized on 4/6/2023 3:22 PM by Dr. Royce Granger M.D.        Ordered the above noted radiological studies. Reviewed by me in PACS.            PROCEDURES  Procedures          MEDICATIONS GIVEN IN ER  Medications   sodium chloride 0.9 % infusion (125 mL/hr Intravenous New Bag 4/6/23 1601)   ondansetron (ZOFRAN) injection 4 mg (has no administration in time range)   morphine injection 2 mg (has no administration in time range)   sodium chloride 0.9 % infusion (has no administration in time range)   pantoprazole (PROTONIX) injection  40 mg (has no administration in time range)   sodium chloride 0.9 % bolus 500 mL (0 mL Intravenous Stopped 4/6/23 1408)   ondansetron (ZOFRAN) injection 4 mg (4 mg Intravenous Given 4/6/23 1339)   morphine injection 4 mg (4 mg Intravenous Given 4/6/23 1339)             MEDICAL DECISION MAKING, PROGRESS, and CONSULTS    All labs have been independently reviewed by me.  All radiology studies have been reviewed by me and I have also reviewed the radiology report.   EKG's independently viewed and interpreted by me.  Discussion below represents my analysis of pertinent findings related to patient's condition, differential diagnosis, treatment plan and final disposition.      Additional sources:  - Discussed/ obtained information from independent historians: The patient's wife states that he does flush his ostomy daily and usually never has this problem    - External (non-ED) record review: The patient had a colonoscopy by Dr. Pelaez in November 2022    - Chronic or social conditions impacting care: The patient lives at home with his wife    - Shared decision making: After shared decision-making discussion between myself, the patient and his wife we agree he needs to be admitted to the hospital for further evaluation and care      Orders placed during this visit:  Orders Placed This Encounter   Procedures   • CT Abdomen Pelvis Without Contrast   • Comprehensive Metabolic Panel   • Lipase   • Urinalysis With Microscopic If Indicated (No Culture) - Urine, Clean Catch   • CBC Auto Differential   • Urinalysis, Microscopic Only - Urine, Clean Catch   • NPO Diet NPO Type: Sips with Meds   • Surgery (on-call MD unless specified)   • LIPPS (on-call MD unless specified)   • Inpatient Admission   • CBC & Differential         Differential diagnosis:  My differential diagnosis includes but is not limited to gastritis, pancreatitis, cholecystitis, appendicitis, diverticulitis, urinary tract infection, kidney stone, or bowel  obstruction.        Independent interpretation of labs, radiology studies, and discussions with consultants:  ED Course as of 04/06/23 1725   Thu Apr 06, 2023   1319 I will treat the patient with IV fluids, pain and nausea medicine while obtaining labs and CT scan for further evaluation [GP]   1441 The patient's white count is elevated at 20 and his BUN and creatinine are elevated at 32 and 2.54.  The last renal function of seeing the patient was normal in August 2022 [GP]   1442 Currently the patient is in CT scan [GP]   1536 The patient's abdominal CT scan was read as likely ileus and less likely early partial bowel obstruction.  With the patient's elevated white blood cell count I will consult surgery.  With his elevated creatinine I will have him gentle hydration and consult the hospitalist. [GP]   1551 On repeat examination I advised the patient was abnormal CT scan and advised him that I will consult with surgery.  I also advised him of his elevated white count and acute kidney injury.  I will start maintenance IV fluids while awaiting surgery callback [GP]   1555 I discussed the case with Dr. Corrales who will admit the patient to a [facility] bed.  He is aware the patient's elevated white count and creatinine and abnormal abdominal CT scan.  He is aware of consulted surgery. [GP]   1600 I discussed the case with Dr. Scott from surgery.  He has reviewed the patient's abdominal CT and he feels this is likely an ileus.  He will consult. [GP]      ED Course User Index  [GP] Sonu Raymond MD               DIAGNOSIS  Final diagnoses:   Acute abdominal pain   Ileus   Leukocytosis, unspecified type   Acute kidney injury         DISPOSITION  ADMISSION    Discussed treatment plan and reason for admission with pt/family and admitting physician.  Pt/family voiced understanding of the plan for admission for further testing/treatment as needed.            Latest Documented Vital Signs:  As of 17:25 EDT  BP- 145/72 HR- 58  Temp- 97.9 °F (36.6 °C) (Oral) O2 sat- 98%--      --------------------  Please note that portions of this were completed with a voice recognition program.       Note Disclaimer: At [facility], we believe that sharing information builds trust and better relationships. You are receiving this note because you are receiving care at [facility] or recently visited. It is possible you will see health information before a provider has talked with you about it. This kind of information can be easy to misunderstand. To help you fully understand what it means for your health, we urge you to discuss this note with your provider.           Sonu Raymond MD  04/06/23 2278

## 2023-04-06 NOTE — ED NOTES
Pt c/o not being able to irrigate ostomy this morning. Pt also c/o lower abdominal pain and nausea since trying to irrigate it.     This RN wore mask, gloves, eyewear and all other appropriate PPE while performing patient care.

## 2023-04-06 NOTE — CONSULTS
[facility]   Consult Note    Patient Name: Luis Diaz  : 1945  MRN: [id number]  Primary Care Physician:  Riddhi Ruiz MD  Referring Physician: Jeb Corrales MD  Date of admission: 2023    Surgery (on-call MD unless specified)  Consult performed by: Isaias Scott Jr., MD  Consult ordered by: Sonu Raymond MD        Subjective   Subjective     Reason for Consult/ Chief Complaint: Abdominal pain    History of Present Illness  Luis Diaz is a very pleasant 77 y.o. male who had rectal cancer and underwent an APR in .  He has managed his colostomy well since that time and routinely irrigates it every morning to try to elicit bowel function and limit the amount of stool that comes out of the colostomy during the rest of the day.    He began developing abdominal distention and decreased stool output despite irrigating 1 L of fluid through his colostomy each day.  He underwent a colonoscopy on 2022 that identified 2 polyps that were removed as well as a stricture at the ostomy site that was dilated.    He has basically returned to his normal state of health until today when he irrigated the colostomy and developed the acute onset of pain with abdominal distention.  This prompted a presentation to the emergency room where a CT scan of the abdomen and pelvis showed no significant abnormalities but did show moderate contents in the colon suggesting a colonic ileus.    Review of Systems   Constitutional: Negative for fatigue and fever.   Respiratory: Negative for chest tightness and shortness of breath.    Cardiovascular: Negative for chest pain and palpitations.   Gastrointestinal: Positive for abdominal pain. Negative for blood in stool, constipation, diarrhea, nausea and vomiting.        Personal History     Past Medical History:   Diagnosis Date   • Arthritis    • At risk for transmitting infection to others 2016   • Atrial fibrillation    • B12 deficiency    • Bone spur     • BPH (benign prostatic hyperplasia)     FOLLOWED BY DR. SALAS OGLESBY   • Bruises easily    • Cellulitis of left lower extremity 03/10/2020   • Closed displaced comminuted fracture of right patella 10/01/2019    SEEN AT [facility]   • Colon cancer 05/19/1996    Operation   • Colostomy and enterostomy malfunction 03/10/2020   • Colostomy in place     LEFT   • Coronary artery disease 03/2022    AFiB   • Diabetes mellitus     TYPE 2, NIDDM   • Fatigue    • GERD (gastroesophageal reflux disease)    • Hyperlipidemia    • Hypertension    • Ileus 10/03/2014    ADMITTED TO [facility]   • Influenza 01/01/2013   • Influenza A 01/03/2014   • Left leg swelling 02/2020   • OA (osteoarthritis)    • Onychomycosis 05/2016   • Osteopenia    • Polio 1951   • Prostatitis    • PSA elevation 10/2014    FOLLOWED BY DR. 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 NO CHANGES   • Rectal cancer 1996    S/P APR WITH colOSTOMY   • Seasonal allergies    • Shingles 04/26/2016    SEEN AT [facility]   • Sleep apnea     CPAP   • Thrombocytopenia    • Vitamin D deficiency        Past Surgical History:   Procedure Laterality Date   • CARDIOVERSION      6/2022, 5/2022   • CHOLECYSTECTOMY N/A 08/09/2012    LAPAROSCOPIC, DR. JACQUELINE PELAEZ AT [facility]   • COLON RESECTION N/A 04/1996    APR WITH COLOSTOMY, DR.DAVID TAYLOR   • COLONOSCOPY N/A     DR. LAI RAMIREZ   • COLONOSCOPY N/A 11/21/2022    Procedure: COLONOSCOPY THROUGH OSTOMY TO CECUM WITH COLD BX POLYPECTOMY;  Surgeon: Jacqueline Pelaez MD;  Location: [facility] ENDOSCOPY;  Service: General;  Laterality: N/A;  HX RECTAL CA  --CECAL MUCOSAL ABNORMALITY, POLYP    • ENDOSCOPY N/A 12/18/2018    NO ENDOSCOPIC ABNORMALITY WAS EVIDENT, DILATION OF ESOPHAGUS WITH LITTLEJOHN DILATOR TO 48FR AND 52FR, DR. ADELIA TOLEDO AT [facility]   • FOOT SURGERY Left 07/1958    RUBIO PROCEDURE FOR TENDON REPAIR, GREAT TOE   •  INCISION AND DRAINAGE PERIRECTAL ABSCESS N/A 06/04/2020    Procedure: excision/debridement of perineal sinus cavity with drain placement;  Surgeon: Tianna Sagastume MD;  Location: [facility];  Service: General;  Laterality: N/A;   • KNEE ARTHROPLASTY Left     2013   • KNEE CARTILAGE SURGERY Left    • ORIF FINGER / THUMB FRACTURE Left 1985   • PATELLA TENDON REPAIR Right 10/10/2019    Procedure: PATELLA TENDON REPAIR;  Surgeon: Clarissa Roman MD;  Location: [facility];  Service: Orthopedics   • SIGMOIDOSCOPY N/A 12/18/2018    NO CASTILLO'S POUCH WAS PRESENT, A SMALL SINUS TRACT WITH MATERIAL SUTURE AS A WICK WAS PRESENT, DR. ADELIA TOLEDO AT [facility]   • TONSILLECTOMY AND ADENOIDECTOMY Bilateral 1950    AT [facility]   • TOTAL HIP ARTHROPLASTY Right 03/06/2014    DR. CLARISSA ROMAN AT [facility]   • TOTAL KNEE ARTHROPLASTY Right 08/08/2019    Procedure: RIGHT TOTAL KNEE ARTHROPLASTY;  Surgeon: Clarissa Roman MD;  Location: [facility];  Service: Orthopedics       Family History: family history includes Alcohol abuse in his father; Arthritis in his mother; Asthma in his brother; COPD in his brother; Cancer in his brother and mother; Colon polyps in his brother; Diabetes in his maternal grandfather, mother, and paternal grandmother; Drug abuse in his father; Emphysema in his mother; Heart attack in his mother; Heart disease in his maternal grandfather and mother; Heart failure in his mother; Hypertension in his father; Lung cancer in his father; No Known Problems in his daughter and daughter; Osteoarthritis in his mother; Prostate cancer in his brother. Otherwise pertinent FHx was reviewed and not pertinent to current issue.    Social History:  reports that he has never smoked. He has never used smokeless tobacco. He reports that he does not drink alcohol and does not use drugs.    Home Medications:   Omega-3 Fatty Acids, SITagliptin, Vitamin D (Ergocalciferol), amLODIPine, amiodarone, apixaban,  atorvastatin, coenzyme Q10, glipizide, hydroCHLOROthiazide, lisinopril, metFORMIN ER, multivitamin with minerals, naproxen sodium, polyethylene glycol, saccharomyces boulardii, and tamsulosin    Allergies:  Allergies   Allergen Reactions   • Toradol [Ketorolac Tromethamine] Dizziness     SYNCOPE, TUNNEL VISION       Objective    Objective     Vitals:  Temp:  [97.4 °F (36.3 °C)] 97.4 °F (36.3 °C)  Heart Rate:  [52-68] 56  Resp:  [18] 18  BP: (118-149)/(63-72) 118/63    Physical Exam  Constitutional:       Appearance: He is not ill-appearing or toxic-appearing.   HENT:      Head: Normocephalic and atraumatic.   Eyes:      General: No scleral icterus.     Extraocular Movements: Extraocular movements intact.   Pulmonary:      Effort: Pulmonary effort is normal. No respiratory distress.   Abdominal:      General: Abdomen is flat. There is no distension.      Palpations: Abdomen is soft.      Tenderness: There is no abdominal tenderness.      Comments: There is a left lower quadrant colostomy that is viable.  Digital examination of the colostomy was performed that showed a stricture that was dilated and resulted in immediate return of stool.   Neurological:      Mental Status: He is alert.   Psychiatric:         Behavior: Behavior is cooperative.         Result Review    Result Review:  I have personally reviewed the results from the time of this admission to 4/6/2023 16:29 EDT and agree with these findings:  [x]  Laboratory list / accordion  []  Microbiology  [x]  Radiology  []  EKG/Telemetry   []  Cardiology/Vascular   []  Pathology  [x]  Old records  []  Other      Assessment & Plan   Assessment / Plan     Brief Patient Summary with assessment and plan:  Luis Diaz is a 77 y.o. male who had previous rectal cancer and underwent an APR 1996.  He has a left lower quadrant colostomy which she manages with irrigations every morning to elicit bowel function.  This morning he developed abdominal pain with irrigation  followed by abdominal distention and presented to the emergency room.    1.  Abdominal pain: The patient developed abdominal pain that is likely related to a stomal stricture.  This was dilated by performing a digital exam of the colostomy with return of stool.  I expect his bowel function to return to normal.  I agree with admitting for 23-hour observation with plans for discharge tomorrow if his bowel function returns to baseline and the abdominal pain resolves.      Isaias Scott Jr., MD

## 2023-04-06 NOTE — ED TRIAGE NOTES
Patient states he irrigates his ostomy every morning, states he attempted to irrigate this morning and couldn't. States severe abd pain now.

## 2023-04-06 NOTE — ED NOTES
Nursing report ED to floor  Luis Diaz  77 y.o.  male    HPI :   Chief Complaint   Patient presents with    Abdominal Pain       Admitting doctor:   Jeb Corrales MD    Admitting diagnosis:   The primary encounter diagnosis was Acute abdominal pain. Diagnoses of Ileus, Leukocytosis, unspecified type, and Acute kidney injury were also pertinent to this visit.    Code status:   Current Code Status       Date Active Code Status Order ID Comments User Context       Prior            Allergies:   Toradol [ketorolac tromethamine]    Isolation:   No active isolations    Intake and Output    Intake/Output Summary (Last 24 hours) at 4/6/2023 1616  Last data filed at 4/6/2023 1408  Gross per 24 hour   Intake 500 ml   Output --   Net 500 ml       Weight:       04/06/23  1305   Weight: 108 kg (237 lb)       Most recent vitals:   Vitals:    04/06/23 1430 04/06/23 1431 04/06/23 1531 04/06/23 1601   BP:  122/66 121/65 118/63   Pulse: 52  52 56   Resp:       Temp:       TempSrc:       SpO2: 93%  94% 95%   Weight:       Height:           Active LDAs/IV Access:   Lines, Drains & Airways       Active LDAs       Name Placement date Placement time Site Days    Peripheral IV 04/06/23 1335 Left;Posterior Forearm 04/06/23  1335  Forearm  less than 1    Open Drain Perineal 06/04/20  0934  Perineal  1036    Colostomy LLQ 04/19/96  --  LLQ  9848                    Labs (abnormal labs have a star):   Labs Reviewed   COMPREHENSIVE METABOLIC PANEL - Abnormal; Notable for the following components:       Result Value    Glucose 239 (*)     BUN 32 (*)     Creatinine 2.54 (*)     Alkaline Phosphatase 120 (*)     eGFR 25.5 (*)     All other components within normal limits    Narrative:     GFR Normal >60  Chronic Kidney Disease <60  Kidney Failure <15    The GFR formula is only valid for adults with stable renal function between ages 18 and 70.   LIPASE - Abnormal; Notable for the following components:    Lipase 69 (*)     All other components  within normal limits   URINALYSIS W/ MICROSCOPIC IF INDICATED (NO CULTURE) - Abnormal; Notable for the following components:    Color, UA Dark Yellow (*)     Ketones, UA Trace (*)     Leuk Esterase, UA Trace (*)     All other components within normal limits   CBC WITH AUTO DIFFERENTIAL - Abnormal; Notable for the following components:    WBC 20.46 (*)     Neutrophil % 84.7 (*)     Lymphocyte % 6.4 (*)     Eosinophil % 0.0 (*)     Immature Grans % 1.7 (*)     Neutrophils, Absolute 17.32 (*)     Monocytes, Absolute 1.41 (*)     Immature Grans, Absolute 0.35 (*)     All other components within normal limits   URINALYSIS, MICROSCOPIC ONLY   CBC AND DIFFERENTIAL    Narrative:     The following orders were created for panel order CBC & Differential.  Procedure                               Abnormality         Status                     ---------                               -----------         ------                     CBC Auto Differential[[id number]]        Abnormal            Final result                 Please view results for these tests on the individual orders.       EKG:   No orders to display       Meds given in ED:   Medications   sodium chloride 0.9 % infusion (125 mL/hr Intravenous New Bag 4/6/23 1601)   sodium chloride 0.9 % bolus 500 mL (0 mL Intravenous Stopped 4/6/23 1408)   ondansetron (ZOFRAN) injection 4 mg (4 mg Intravenous Given 4/6/23 1339)   morphine injection 4 mg (4 mg Intravenous Given 4/6/23 1339)       Imaging results:  CT Abdomen Pelvis Without Contrast    Result Date: 4/6/2023  1. Status post partial colectomy with a left lower quadrant colostomy and Vega's pouch. Mildly dilated fluid and air-filled loops of proximal colon measuring up to 6.5 cm favoring a colonic ileus/colitis. Recommend conservative follow-up or surgical consultation if there is concern for developing obstruction. 2. Colonic diverticulosis with mild uncomplicated sigmoid diverticulitis. 3. Interval decrease in size of  the subcapsular hematoma along the posterior interpolar left kidney approximating 1.9 x 5.4 cm (previously 5.6 x 8.8 cm) with persistent scarring in the left perinephric space. 4. Please see above for additional findings/recommendations.  This report was finalized on 4/6/2023 3:22 PM by Dr. Royce Granger M.D.       Ambulatory status:   - stand by    Social issues:   Social History     Socioeconomic History    Marital status:    Tobacco Use    Smoking status: Never    Smokeless tobacco: Never   Vaping Use    Vaping Use: Never used   Substance and Sexual Activity    Alcohol use: No    Drug use: No    Sexual activity: Not Currently     Partners: Female     Birth control/protection: None       NIH Stroke Scale:         Gill Luna RN  04/06/23 16:16 EDT

## 2023-04-07 ENCOUNTER — APPOINTMENT (OUTPATIENT)
Dept: GENERAL RADIOLOGY | Facility: HOSPITAL | Age: 78
End: 2023-04-07
Payer: MEDICARE

## 2023-04-07 LAB
ANION GAP SERPL CALCULATED.3IONS-SCNC: 10 MMOL/L (ref 5–15)
BASOPHILS # BLD AUTO: 0.04 10*3/MM3 (ref 0–0.2)
BASOPHILS NFR BLD AUTO: 0.2 % (ref 0–1.5)
BUN SERPL-MCNC: 39 MG/DL (ref 8–23)
BUN/CREAT SERPL: 16 (ref 7–25)
C DIFF TOX GENS STL QL NAA+PROBE: NEGATIVE
CALCIUM SPEC-SCNC: 9.4 MG/DL (ref 8.6–10.5)
CHLORIDE SERPL-SCNC: 107 MMOL/L (ref 98–107)
CO2 SERPL-SCNC: 21 MMOL/L (ref 22–29)
CREAT SERPL-MCNC: 2.43 MG/DL (ref 0.76–1.27)
CREAT UR-MCNC: 78.6 MG/DL
DEPRECATED RDW RBC AUTO: 40.2 FL (ref 37–54)
EGFRCR SERPLBLD CKD-EPI 2021: 26.9 ML/MIN/1.73
EOSINOPHIL # BLD AUTO: 0.01 10*3/MM3 (ref 0–0.4)
EOSINOPHIL NFR BLD AUTO: 0 % (ref 0.3–6.2)
ERYTHROCYTE [DISTWIDTH] IN BLOOD BY AUTOMATED COUNT: 12.9 % (ref 12.3–15.4)
GLUCOSE BLDC GLUCOMTR-MCNC: 115 MG/DL (ref 70–130)
GLUCOSE BLDC GLUCOMTR-MCNC: 122 MG/DL (ref 70–130)
GLUCOSE BLDC GLUCOMTR-MCNC: 127 MG/DL (ref 70–130)
GLUCOSE BLDC GLUCOMTR-MCNC: 130 MG/DL (ref 70–130)
GLUCOSE SERPL-MCNC: 183 MG/DL (ref 65–99)
HCT VFR BLD AUTO: 41.3 % (ref 37.5–51)
HGB BLD-MCNC: 14 G/DL (ref 13–17.7)
IMM GRANULOCYTES # BLD AUTO: 0.25 10*3/MM3 (ref 0–0.05)
IMM GRANULOCYTES NFR BLD AUTO: 1.1 % (ref 0–0.5)
LYMPHOCYTES # BLD AUTO: 1.34 10*3/MM3 (ref 0.7–3.1)
LYMPHOCYTES NFR BLD AUTO: 5.7 % (ref 19.6–45.3)
MCH RBC QN AUTO: 28.6 PG (ref 26.6–33)
MCHC RBC AUTO-ENTMCNC: 33.9 G/DL (ref 31.5–35.7)
MCV RBC AUTO: 84.3 FL (ref 79–97)
MONOCYTES # BLD AUTO: 2.05 10*3/MM3 (ref 0.1–0.9)
MONOCYTES NFR BLD AUTO: 8.8 % (ref 5–12)
NEUTROPHILS NFR BLD AUTO: 19.64 10*3/MM3 (ref 1.7–7)
NEUTROPHILS NFR BLD AUTO: 84.2 % (ref 42.7–76)
NRBC BLD AUTO-RTO: 0 /100 WBC (ref 0–0.2)
NT-PROBNP SERPL-MCNC: 292 PG/ML (ref 0–1800)
PLATELET # BLD AUTO: 262 10*3/MM3 (ref 140–450)
PMV BLD AUTO: 9.9 FL (ref 6–12)
POTASSIUM SERPL-SCNC: 4.9 MMOL/L (ref 3.5–5.2)
RBC # BLD AUTO: 4.9 10*6/MM3 (ref 4.14–5.8)
SODIUM SERPL-SCNC: 138 MMOL/L (ref 136–145)
SODIUM UR-SCNC: 83 MMOL/L
WBC NRBC COR # BLD: 23.33 10*3/MM3 (ref 3.4–10.8)

## 2023-04-07 PROCEDURE — 96375 TX/PRO/DX INJ NEW DRUG ADDON: CPT

## 2023-04-07 PROCEDURE — 83880 ASSAY OF NATRIURETIC PEPTIDE: CPT | Performed by: HOSPITALIST

## 2023-04-07 PROCEDURE — 82962 GLUCOSE BLOOD TEST: CPT

## 2023-04-07 PROCEDURE — 71045 X-RAY EXAM CHEST 1 VIEW: CPT

## 2023-04-07 PROCEDURE — G0378 HOSPITAL OBSERVATION PER HR: HCPCS

## 2023-04-07 PROCEDURE — 84300 ASSAY OF URINE SODIUM: CPT | Performed by: INTERNAL MEDICINE

## 2023-04-07 PROCEDURE — 85025 COMPLETE CBC W/AUTO DIFF WBC: CPT | Performed by: HOSPITALIST

## 2023-04-07 PROCEDURE — 96376 TX/PRO/DX INJ SAME DRUG ADON: CPT

## 2023-04-07 PROCEDURE — 99231 SBSQ HOSP IP/OBS SF/LOW 25: CPT | Performed by: SURGERY

## 2023-04-07 PROCEDURE — 82570 ASSAY OF URINE CREATININE: CPT | Performed by: INTERNAL MEDICINE

## 2023-04-07 PROCEDURE — 87493 C DIFF AMPLIFIED PROBE: CPT | Performed by: HOSPITALIST

## 2023-04-07 PROCEDURE — 87040 BLOOD CULTURE FOR BACTERIA: CPT | Performed by: HOSPITALIST

## 2023-04-07 PROCEDURE — 80048 BASIC METABOLIC PNL TOTAL CA: CPT | Performed by: HOSPITALIST

## 2023-04-07 RX ORDER — TAMSULOSIN HYDROCHLORIDE 0.4 MG/1
0.4 CAPSULE ORAL DAILY
Status: DISCONTINUED | OUTPATIENT
Start: 2023-04-07 | End: 2023-04-08 | Stop reason: HOSPADM

## 2023-04-07 RX ORDER — POLYETHYLENE GLYCOL 3350 17 G/17G
17 POWDER, FOR SOLUTION ORAL DAILY
Status: DISCONTINUED | OUTPATIENT
Start: 2023-04-07 | End: 2023-04-07

## 2023-04-07 RX ORDER — AMLODIPINE BESYLATE 5 MG/1
5 TABLET ORAL DAILY
Status: DISCONTINUED | OUTPATIENT
Start: 2023-04-07 | End: 2023-04-07

## 2023-04-07 RX ORDER — AMIODARONE HYDROCHLORIDE 200 MG/1
100 TABLET ORAL DAILY
Status: DISCONTINUED | OUTPATIENT
Start: 2023-04-07 | End: 2023-04-08 | Stop reason: HOSPADM

## 2023-04-07 RX ORDER — FAMOTIDINE 10 MG/ML
20 INJECTION, SOLUTION INTRAVENOUS EVERY 12 HOURS SCHEDULED
Status: DISCONTINUED | OUTPATIENT
Start: 2023-04-07 | End: 2023-04-08

## 2023-04-07 RX ORDER — AMLODIPINE BESYLATE 10 MG/1
10 TABLET ORAL DAILY
Status: DISCONTINUED | OUTPATIENT
Start: 2023-04-08 | End: 2023-04-08 | Stop reason: HOSPADM

## 2023-04-07 RX ORDER — ATORVASTATIN CALCIUM 20 MG/1
10 TABLET, FILM COATED ORAL NIGHTLY
Status: DISCONTINUED | OUTPATIENT
Start: 2023-04-07 | End: 2023-04-08 | Stop reason: HOSPADM

## 2023-04-07 RX ORDER — SACCHAROMYCES BOULARDII 250 MG
250 CAPSULE ORAL 2 TIMES DAILY
Status: DISCONTINUED | OUTPATIENT
Start: 2023-04-07 | End: 2023-04-08 | Stop reason: HOSPADM

## 2023-04-07 RX ADMIN — AMIODARONE HYDROCHLORIDE 100 MG: 200 TABLET ORAL at 15:02

## 2023-04-07 RX ADMIN — APIXABAN 5 MG: 5 TABLET, FILM COATED ORAL at 21:56

## 2023-04-07 RX ADMIN — PANTOPRAZOLE SODIUM 40 MG: 40 INJECTION, POWDER, FOR SOLUTION INTRAVENOUS at 08:29

## 2023-04-07 RX ADMIN — APIXABAN 5 MG: 5 TABLET, FILM COATED ORAL at 15:02

## 2023-04-07 RX ADMIN — Medication 250 MG: at 21:56

## 2023-04-07 RX ADMIN — Medication 250 MG: at 15:02

## 2023-04-07 RX ADMIN — FAMOTIDINE 20 MG: 10 INJECTION INTRAVENOUS at 21:56

## 2023-04-07 RX ADMIN — FAMOTIDINE 20 MG: 10 INJECTION INTRAVENOUS at 15:03

## 2023-04-07 RX ADMIN — ATORVASTATIN CALCIUM 10 MG: 20 TABLET, FILM COATED ORAL at 21:56

## 2023-04-07 RX ADMIN — TAMSULOSIN HYDROCHLORIDE 0.4 MG: 0.4 CAPSULE ORAL at 15:02

## 2023-04-07 RX ADMIN — SODIUM CHLORIDE 75 ML/HR: 9 INJECTION, SOLUTION INTRAVENOUS at 15:10

## 2023-04-07 NOTE — CONSULTS
"  Patient Care Team:  Riddhi Ruiz MD as PCP - General (Family Medicine)    Chief complaint: TEVIN      History of Present Illness  Patient is a 78 yo male with history of colostomy for about 27 years who presented to ED with abdominal pain. He was found to have partial colon obstruction which seems to be better.     He was also found to have TEVIN, with Cr up to 2.5, it is 2.4 today. Hie Cr is October 2022 was 1.8. prior to that his Cr was normal.   He states he had trauma about 6 months ago, states he fell and his kidney \"split\". He has been followed by urology. CT scan here on 10/10/22 showed a left renal subcapsular hematoma. CT yesterday show decrease size in hematoma. No further information in chart about this event.     He does reports having some vomiting last week and at times increase ostomy output     Review of Systems   Review of Systems - History obtained from chart review and the patient  General ROS: negative  Psychological ROS: negative  Ophthalmic ROS: negative  ENT ROS: negative  Allergy and Immunology ROS: negative  Hematological and Lymphatic ROS: negative  Endocrine ROS: negative  Respiratory ROS: no cough, shortness of breath, or wheezing  Cardiovascular ROS: no chest pain or dyspnea on exertion  Gastrointestinal ROS: no abdominal pain, change in bowel habits, or black or bloody stools  Genito-Urinary ROS: no dysuria, trouble voiding, or hematuria  Musculoskeletal ROS: negative  Neurological ROS: no TIA or stroke symptoms  Dermatological ROS: negative  Past Medical History:   Diagnosis Date   • Arthritis    • At risk for transmitting infection to others 04/19/2016   • Atrial fibrillation    • B12 deficiency    • Bone spur    • BPH (benign prostatic hyperplasia)     FOLLOWED BY DR. SALAS OGLESBY   • Bruises easily    • Cellulitis of left lower extremity 03/10/2020   • Closed displaced comminuted fracture of right patella 10/01/2019    SEEN AT [facility]   • Colon cancer 05/19/1996    Operation "   • Colostomy and enterostomy malfunction 03/10/2020   • Colostomy in place     LEFT   • Coronary artery disease 03/2022    AFiB   • Diabetes mellitus     TYPE 2, NIDDM   • Fatigue    • GERD (gastroesophageal reflux disease)    • Hyperlipidemia    • Hypertension    • Ileus 10/03/2014    ADMITTED TO [facility]   • Influenza 01/01/2013   • Influenza A 01/03/2014   • Left leg swelling 02/2020   • OA (osteoarthritis)    • Onychomycosis 05/2016   • Osteopenia    • Polio 1951   • Prostatitis    • PSA elevation 10/2014    FOLLOWED BY DR. 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 NO CHANGES   • Rectal cancer 1996    S/P APR WITH colOSTOMY   • Seasonal allergies    • Shingles 04/26/2016    SEEN AT [facility] ER   • Sleep apnea     CPAP   • Thrombocytopenia    • Vitamin D deficiency    ,   Past Surgical History:   Procedure Laterality Date   • CARDIOVERSION      6/2022, 5/2022   • CHOLECYSTECTOMY N/A 08/09/2012    LAPAROSCOPIC, DR. JACQUELINE PELAEZ AT [facility]   • COLON RESECTION N/A 04/1996    APR WITH COLOSTOMY, DR.DAVID TAYLOR   • COLONOSCOPY N/A     DR. LAI RAMIREZ   • COLONOSCOPY N/A 11/21/2022    Procedure: COLONOSCOPY THROUGH OSTOMY TO CECUM WITH COLD BX POLYPECTOMY;  Surgeon: Jacqueline Pelaez MD;  Location: [facility] ENDOSCOPY;  Service: General;  Laterality: N/A;  HX RECTAL CA  --CECAL MUCOSAL ABNORMALITY, POLYP    • ENDOSCOPY N/A 12/18/2018    NO ENDOSCOPIC ABNORMALITY WAS EVIDENT, DILATION OF ESOPHAGUS WITH LITTLEJOHN DILATOR TO 48FR AND 52FR, DR. ADELIA TOLEDO AT [facility]   • FOOT SURGERY Left 07/1958    RUBIO PROCEDURE FOR TENDON REPAIR, GREAT TOE   • INCISION AND DRAINAGE PERIRECTAL ABSCESS N/A 06/04/2020    Procedure: excision/debridement of perineal sinus cavity with drain placement;  Surgeon: Tianna Sagastume MD;  Location: [facility] MAIN OR;  Service: General;  Laterality: N/A;   • KNEE ARTHROPLASTY Left     2013   •  KNEE CARTILAGE SURGERY Left    • ORIF FINGER / THUMB FRACTURE Left 1985   • PATELLA TENDON REPAIR Right 10/10/2019    Procedure: PATELLA TENDON REPAIR;  Surgeon: Luis Roman MD;  Location: [facility];  Service: Orthopedics   • SIGMOIDOSCOPY N/A 12/18/2018    NO CASTILLO'S POUCH WAS PRESENT, A SMALL SINUS TRACT WITH MATERIAL SUTURE AS A WICK WAS PRESENT, DR. ADELIA TOLEDO AT [facility]   • TONSILLECTOMY AND ADENOIDECTOMY Bilateral 1950    AT [facility]   • TOTAL HIP ARTHROPLASTY Right 03/06/2014    DR. LUIS ROMAN AT [facility]   • TOTAL KNEE ARTHROPLASTY Right 08/08/2019    Procedure: RIGHT TOTAL KNEE ARTHROPLASTY;  Surgeon: Luis Roman MD;  Location: [facility];  Service: Orthopedics   ,   Family History   Problem Relation Age of Onset   • Emphysema Mother    • Heart disease Mother    • Diabetes Mother    • Heart failure Mother    • Heart attack Mother    • Arthritis Mother    • Osteoarthritis Mother    • Cancer Mother    • Lung cancer Father    • Drug abuse Father    • Hypertension Father    • Alcohol abuse Father    • Prostate cancer Brother    • Colon polyps Brother    • Asthma Brother    • COPD Brother    • Cancer Brother    • Diabetes Maternal Grandfather    • Heart disease Maternal Grandfather    • Diabetes Paternal Grandmother    • No Known Problems Daughter    • No Known Problems Daughter    • Malig Hyperthermia Neg Hx    ,   Social History     Socioeconomic History   • Marital status:    Tobacco Use   • Smoking status: Never   • Smokeless tobacco: Never   Vaping Use   • Vaping Use: Never used   Substance and Sexual Activity   • Alcohol use: No   • Drug use: No   • Sexual activity: Not Currently     Partners: Female     Birth control/protection: None     E-cigarette/Vaping   • E-cigarette/Vaping Use Never User      E-cigarette/Vaping Substances     E-cigarette/Vaping Devices       ,   Medications Prior to Admission   Medication Sig Dispense Refill Last Dose   • amiodarone (PACERONE)  200 MG tablet Take 100 mg by mouth Daily.   4/6/2023   • amLODIPine (NORVASC) 5 MG tablet Take 1 tablet by mouth Daily. Unsure of dose   4/6/2023   • apixaban (ELIQUIS) 5 MG tablet tablet Take 1 tablet by mouth 2 (Two) Times a Day. HOLDING AS OF SAT 11/19/2022 FOR ENDO 60 tablet  4/6/2023   • atorvastatin (LIPITOR) 10 MG tablet Take 1 tablet by mouth Every Night.   4/5/2023   • coenzyme Q10 100 MG capsule Take 1 capsule by mouth Daily.   4/5/2023   • glipizide (GLUCOTROL) 10 MG tablet Take 1 tablet by mouth Daily.   4/6/2023   • hydroCHLOROthiazide (HYDRODIURIL) 25 MG tablet Take 1 tablet by mouth Daily.   4/6/2023   • JANUVIA 100 MG tablet Take 1 tablet by mouth Daily.   4/6/2023   • lisinopril (PRINIVIL,ZESTRIL) 20 MG tablet Take 1 tablet by mouth 2 (Two) Times a Day.   4/6/2023   • metFORMIN ER (GLUCOPHAGE-XR) 750 MG 24 hr tablet Take 1 tablet by mouth Daily With Breakfast.   4/6/2023   • Multiple Vitamins-Minerals (CENTRUM SILVER 50+MEN PO) Take  by mouth.   4/5/2023   • naproxen sodium (ALEVE) 220 MG tablet Take 1 tablet by mouth 2 (Two) Times a Day As Needed for Mild Pain.   4/6/2023   • Omega-3 Fatty Acids (OMEGA 3 PO) Take 2 capsules by mouth Daily.   4/6/2023   • tamsulosin (FLOMAX) 0.4 MG capsule 24 hr capsule Take 1 capsule by mouth Daily.   4/6/2023   • Vitamin D, Ergocalciferol, 2000 units capsule Take 1 tablet by mouth Daily.   4/5/2023   • polyethylene glycol (MIRALAX) packet Take 17 g by mouth Daily. (Patient taking differently: Take 17 g by mouth Daily As Needed.)   More than a month   • saccharomyces boulardii (Florastor) 250 MG capsule Take 1 capsule by mouth 2 (Two) Times a Day. (Patient taking differently: Take 1 capsule by mouth Daily As Needed.) 60 capsule 2 Unknown   , Scheduled Meds:  amiodarone, 100 mg, Oral, Daily  [START ON 4/8/2023] amLODIPine, 10 mg, Oral, Daily  apixaban, 5 mg, Oral, BID  atorvastatin, 10 mg, Oral, Nightly  famotidine, 20 mg, Intravenous, Q12H  insulin lispro, 0-7  Units, Subcutaneous, TID AC  saccharomyces boulardii, 250 mg, Oral, BID  tamsulosin, 0.4 mg, Oral, Daily    , Continuous Infusions:  sodium chloride, 75 mL/hr, Last Rate: 75 mL/hr (04/07/23 1510)    , PRN Meds:  •  Morphine  •  ondansetron and Allergies:  Toradol [ketorolac tromethamine]    Objective     Vital Signs  Temp:  [97.9 °F (36.6 °C)-98.5 °F (36.9 °C)] 97.9 °F (36.6 °C)  Heart Rate:  [58-67] 67  Resp:  [16-18] 18  BP: (120-145)/(68-84) 143/84    I/O this shift:  In: 720 [P.O.:720]  Out: -   I/O last 3 completed shifts:  In: 620 [P.O.:120; IV Piggyback:500]  Out: -     Physical Exam  Physical Examination: General appearance - alert, well appearing, and in no distress  Mental status - alert, oriented to person, place, and time  Eyes - pupils equal and reactive, extraocular eye movements intact  Chest - clear to auscultation, no wheezes, rales or rhonchi, symmetric air entry  Heart - normal rate, regular rhythm, normal S1, S2, no murmurs, rubs, clicks or gallops  Abdomen - soft, nontender, nondistended, no masses or organomegaly  Neurological - alert, oriented, normal speech, no focal findings or movement disorder noted  Extremities - peripheral pulses normal, no pedal edema, no clubbing or cyanosis  Results Review:    I reviewed the patient's new clinical results.    WBC WBC   Date Value Ref Range Status   04/07/2023 23.33 (H) 3.40 - 10.80 10*3/mm3 Final   04/06/2023 20.46 (H) 3.40 - 10.80 10*3/mm3 Final      HGB Hemoglobin   Date Value Ref Range Status   04/07/2023 14.0 13.0 - 17.7 g/dL Final   04/06/2023 14.6 13.0 - 17.7 g/dL Final      HCT Hematocrit   Date Value Ref Range Status   04/07/2023 41.3 37.5 - 51.0 % Final   04/06/2023 45.2 37.5 - 51.0 % Final      Platlets No results found for: LABPLAT   MCV MCV   Date Value Ref Range Status   04/07/2023 84.3 79.0 - 97.0 fL Final   04/06/2023 88.8 79.0 - 97.0 fL Final          Sodium Sodium   Date Value Ref Range Status   04/07/2023 138 136 - 145 mmol/L Final    04/06/2023 136 136 - 145 mmol/L Final      Potassium Potassium   Date Value Ref Range Status   04/07/2023 4.9 3.5 - 5.2 mmol/L Final   04/06/2023 4.7 3.5 - 5.2 mmol/L Final      Chloride Chloride   Date Value Ref Range Status   04/07/2023 107 98 - 107 mmol/L Final   04/06/2023 101 98 - 107 mmol/L Final      CO2 CO2   Date Value Ref Range Status   04/07/2023 21.0 (L) 22.0 - 29.0 mmol/L Final   04/06/2023 25.0 22.0 - 29.0 mmol/L Final      BUN BUN   Date Value Ref Range Status   04/07/2023 39 (H) 8 - 23 mg/dL Final   04/06/2023 32 (H) 8 - 23 mg/dL Final      Creatinine Creatinine   Date Value Ref Range Status   04/07/2023 2.43 (H) 0.76 - 1.27 mg/dL Final   04/06/2023 2.54 (H) 0.76 - 1.27 mg/dL Final      Calcium Calcium   Date Value Ref Range Status   04/07/2023 9.4 8.6 - 10.5 mg/dL Final   04/06/2023 9.7 8.6 - 10.5 mg/dL Final      PO4 No results found for: CAPO4   Albumin Albumin   Date Value Ref Range Status   04/06/2023 3.8 3.5 - 5.2 g/dL Final      Magnesium No results found for: MG   Uric Acid No results found for: URICACID         Assessment & Plan       Acute abdominal pain      Assessment & Plan  1. TEVIN vs CKD  2. Left renal subcapsular hematoma- improving   3. Bowel obstruction   4. Metabolic acidosis     TEVIN could be due to volume depletion with recent vomiting, bowel obstruction.   However, I am also wondering if this kidney trauma might have caused him some permanent loss is some renal function.   UA is bland.   Will check urine Cr and Na  Continue IVF today.   Check labs in am  Will follow       I discussed the patients findings and my recommendations with patient, family and primary care team    Giselle Bradley MD  04/07/23  16:14 EDT

## 2023-04-07 NOTE — NURSING NOTE
04/07/23 1547   Colostomy LLQ   Placement Date: 04/19/96   Inserted by: [facility]  Location: LLQ   Stomal Appliance 2 piece;Leaking;Changed   Stoma Appearance round;rosebud appearance;moist;flush with skin   Peristomal Assessment Intact;Pink   Accessories/Skin Care convex wafer;skin barrier ring  (Kristen 2 piece 2 3/4)   Stoma Function stool   Stool Color brown, light   Stool Consistency creamy     Wound/Ostomy: We see patient with Colostomy for several years, he is independent in his ostomy care, stated he does irrigation every day. But yesterday, it didn't work until last night today; he has had to change the ostomy pouch twice and is  now leaking.  He uses barrier extenders to support the wafer. Ostomy care performed, good seal noted. Supplies at bedside. Please re-consult for any additional needs.

## 2023-04-07 NOTE — PROGRESS NOTES
Chief Complaint:    Abdominal pain    Subjective:    The patient is feeling well this morning with resolution of his symptoms.  He began having a large amount of colostomy output early this morning and now feels well.    Objective:    Temp:  [97.4 °F (36.3 °C)-98.5 °F (36.9 °C)] 97.9 °F (36.6 °C)  Heart Rate:  [52-68] 67  Resp:  [16-18] 18  BP: (118-149)/(63-84) 143/84    Physical Exam  Constitutional:       Appearance: He is not ill-appearing or toxic-appearing.   Abdominal:      Palpations: Abdomen is soft.      Tenderness: There is no abdominal tenderness.      Comments: Colostomy: Good output.   Neurological:      Mental Status: He is alert.   Psychiatric:         Behavior: Behavior is cooperative.         Results:    WBC is 23.33.  H/H is 14.0/41.3.  BUN is 39 creatinine is 2.43.    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  He has had very good colostomy output and now is pain-free.  There is no need for surgical management.    He has an elevated BUN and creatinine over his baseline and I will leave him on IV fluids.    His WBC remains elevated but there does not appear to be an abdominal source of his leukocytosis based on his benign exam.  This may need to be followed.    Isaias Scott Jr., M.D.

## 2023-04-07 NOTE — PLAN OF CARE
Goal Outcome Evaluation:  Plan of Care Reviewed With: patient        Progress: improving  Outcome Evaluation: Pt arrived to floor from ED due to complaints of abd pain, n/v, and abdominal distention, colostomy LLQ remained CDI, pt up ad gabriela, room air, AOx4, voiding intact, VSS, pt denied any nausea, vomiting, or abdominal pain during this shift, will CTM

## 2023-04-07 NOTE — PLAN OF CARE
Goal Outcome Evaluation:  Patient is a very pleasant gentleman. Here for abdominal pain. Was given Morphine in ER but has not asked for any pain medication while on the floor so far. He states he has no pain at this time and feels better. Has a LLQ colostomy. On a clear liquid diet. Using his home C-pap machine.  Discharge possible for later today if still without pain. VSS. Call light is within reach. Will continue to monitor.

## 2023-04-07 NOTE — H&P
History and physical    Primary care physician      Chief complaint  Abdominal pain    History of present illness  77-year-old white male with multiple medical problems including atrial fibrillation on Eliquis hypertension BPH diabetes mellitus hyperlipidemia who also have colostomy for 27 years presented to [facility] emergency room with severe abdominal pain with abdominal distention started yesterday.  Patient denies any nausea vomiting diarrhea.  Patient work-up in ER revealed ileus versus partial small bowel obstruction and acute kidney injury admitted for management.  Patient has no fever chills chest pain shortness of breath palpitation.    PAST MEDICAL HISTORY  • Arthritis     • Atrial fibrillation     • B12 deficiency     • Bruises easily     • Closed displaced comminuted fracture of right patella 10/01/2019   • Colon cancer 05/19/1996   • Colostomy in place     • Coronary artery disease 03/2022   • Diabetes mellitus     • Fatigue     • GERD (gastroesophageal reflux disease)     • Hyperlipidemia     • Hypertension     • Influenza 01/01/2013   • Influenza A 01/03/2014   • Left leg swelling 02/2020   • OA (osteoarthritis)     • Onychomycosis 05/2016   • Osteopenia     • Polio 1951   • Prostatitis     • PSA elevation 10/2014   • Pseudothrombocytopenia     • Rectal cancer 1996   • Seasonal allergies     • Shingles 04/26/2016      PAST SURGICAL HISTORY              Procedure Laterality Date   • CARDIOVERSION         6/2022, 5/2022   • CHOLECYSTECTOMY N/A 08/09/2012     LAPAROSCOPIC, DR. JACQUELINE PELAEZ AT [facility]   • COLON RESECTION N/A 04/1996     APR WITH COLOSTOMY, DR.DAVID TAYLOR   • COLONOSCOPY N/A       DR. ALI RAMIREZ   • COLONOSCOPY N/A 11/21/2022     Procedure: COLONOSCOPY THROUGH OSTOMY TO CECUM WITH COLD BX POLYPECTOMY;  Surgeon: Jacqueline Pelaez MD;  Location: [facility] ENDOSCOPY;  Service: General;  Laterality: N/A;  HX RECTAL CA  --CECAL MUCOSAL ABNORMALITY, POLYP    • ENDOSCOPY N/A  12/18/2018     NO ENDOSCOPIC ABNORMALITY WAS EVIDENT, DILATION OF ESOPHAGUS WITH LITTLEJOHN DILATOR TO 48FR AND 52FR, DR. ADELIA TOLEDO AT [facility]   • FOOT SURGERY Left 07/1958     RUBIO PROCEDURE FOR TENDON REPAIR, GREAT TOE   • INCISION AND DRAINAGE PERIRECTAL ABSCESS N/A 06/04/2020     Procedure: excision/debridement of perineal sinus cavity with drain placement;  Surgeon: Tianna Sagastume MD;  Location: [facility];  Service: General;  Laterality: N/A;   • KNEE ARTHROPLASTY Left       2013   • KNEE CARTILAGE SURGERY Left     • ORIF FINGER / THUMB FRACTURE Left 1985   • PATELLA TENDON REPAIR Right 10/10/2019     Procedure: PATELLA TENDON REPAIR;  Surgeon: Clarissa Magaña MD;  Location: [facility];  Service: Orthopedics   • SIGMOIDOSCOPY N/A 12/18/2018     NO CASTILLO'S POUCH WAS PRESENT, A SMALL SINUS TRACT WITH MATERIAL SUTURE AS A WICK WAS PRESENT, DR. ADELIA TOLEDO AT [facility]   • TONSILLECTOMY AND ADENOIDECTOMY Bilateral 1950     AT [facility]   • TOTAL HIP ARTHROPLASTY Right 03/06/2014     DR. CLARISSA MAGAÑA AT [facility]   • TOTAL KNEE ARTHROPLASTY Right 08/08/2019     Procedure: RIGHT TOTAL KNEE ARTHROPLASTY;  Surgeon: Clarissa Magaña MD;  Location: [facility];  Service: Orthopedics         FAMILY HISTORY           Problem Relation Age of Onset   • Emphysema Mother     • Heart disease Mother     • Diabetes Mother     • Heart failure Mother     • Heart attack Mother     • Arthritis Mother     • Osteoarthritis Mother     • Cancer Mother     • Lung cancer Father     • Drug abuse Father     • Hypertension Father     • Alcohol abuse Father     • Prostate cancer Brother     • Colon polyps Brother     • Asthma Brother     • COPD Brother     • Cancer Brother     • Diabetes Maternal Grandfather     • Heart disease Maternal Grandfather     • Diabetes Paternal Grandmother     • No Known Problems Daughter     • No Known Problems Daughter     • Malig Hyperthermia Neg Hx        SOCIAL HISTORY                "  Socioeconomic History   • Marital status:    Tobacco Use   • Smoking status: Never   • Smokeless tobacco: Never   Vaping Use   • Vaping Use: Never used   Substance and Sexual Activity   • Alcohol use: No   • Drug use: No   • Sexual activity: Not Currently       Partners: Female       Birth control/protection: None         ALLERGIES  Toradol [ketorolac tromethamine]  Home medications reviewed     REVIEW OF SYSTEMS  All systems reviewed and negative except for those discussed in HPI.      PHYSICAL EXAM  Blood pressure 143/84, pulse 67, temperature 97.9 °F (36.6 °C), temperature source Oral, resp. rate 18, height 172.7 cm (67.99\"), weight 108 kg (237 lb), SpO2 96 %.    GENERAL: Awake and alert in no distress   HENT:  Unremarkable  NECK:  Supple  CV: regular rhythm, normal rate S1-S2  RESPIRATORY: normal effort moving air bilaterally  ABDOMEN:  Soft diffuse tenderness colostomy in place bowel sounds positive  MUSCULOSKELETAL: no deformity  NEURO: alert with nonfocal neuro exam  PSYCH:  calm, cooperative  SKIN: warm, dry     LAB RESULTS  Lab Results (last 24 hours)     Procedure Component Value Units Date/Time    Blood Culture - Blood, Arm, Left [[id number]] Collected: 04/07/23 1157    Specimen: Blood from Arm, Left Updated: 04/07/23 1208    Blood Culture - Blood, Arm, Right [[id number]] Collected: 04/07/23 1157    Specimen: Blood from Arm, Right Updated: 04/07/23 1208    POC Glucose Once [[id number]]  (Normal) Collected: 04/07/23 1040    Specimen: Blood Updated: 04/07/23 1041     Glucose 122 mg/dL      Comment: Meter: [id number] : [id number] Nona ROCHA       POC Glucose Once [[id number]]  (Normal) Collected: 04/07/23 0620    Specimen: Blood Updated: 04/07/23 0622     Glucose 127 mg/dL      Comment: Meter: [id number] : [id number] Bozena ROCHA       Basic Metabolic Panel [[id number]]  (Abnormal) Collected: 04/07/23 0457    Specimen: Blood Updated: 04/07/23 0537     Glucose 183 mg/dL      BUN 39 " mg/dL      Creatinine 2.43 mg/dL      Sodium 138 mmol/L      Potassium 4.9 mmol/L      Chloride 107 mmol/L      CO2 21.0 mmol/L      Calcium 9.4 mg/dL      BUN/Creatinine Ratio 16.0     Anion Gap 10.0 mmol/L      eGFR 26.9 mL/min/1.73     Narrative:      GFR Normal >60  Chronic Kidney Disease <60  Kidney Failure <15    The GFR formula is only valid for adults with stable renal function between ages 18 and 70.    CBC & Differential [[id number]]  (Abnormal) Collected: 04/07/23 0457    Specimen: Blood Updated: 04/07/23 0521    Narrative:      The following orders were created for panel order CBC & Differential.  Procedure                               Abnormality         Status                     ---------                               -----------         ------                     CBC Auto Differential[[id number]]        Abnormal            Final result                 Please view results for these tests on the individual orders.    CBC Auto Differential [[id number]]  (Abnormal) Collected: 04/07/23 0457    Specimen: Blood Updated: 04/07/23 0521     WBC 23.33 10*3/mm3      RBC 4.90 10*6/mm3      Hemoglobin 14.0 g/dL      Hematocrit 41.3 %      MCV 84.3 fL      MCH 28.6 pg      MCHC 33.9 g/dL      RDW 12.9 %      RDW-SD 40.2 fl      MPV 9.9 fL      Platelets 262 10*3/mm3      Neutrophil % 84.2 %      Lymphocyte % 5.7 %      Monocyte % 8.8 %      Eosinophil % 0.0 %      Basophil % 0.2 %      Immature Grans % 1.1 %      Neutrophils, Absolute 19.64 10*3/mm3      Lymphocytes, Absolute 1.34 10*3/mm3      Monocytes, Absolute 2.05 10*3/mm3      Eosinophils, Absolute 0.01 10*3/mm3      Basophils, Absolute 0.04 10*3/mm3      Immature Grans, Absolute 0.25 10*3/mm3      nRBC 0.0 /100 WBC     POC Glucose Once [[id number]]  (Abnormal) Collected: 04/06/23 2134    Specimen: Blood Updated: 04/06/23 2135     Glucose 141 mg/dL      Comment: Meter: [id number] : [id number] Bozena ROCHA       POC Glucose Once [[id number]]  (Abnormal)  Collected: 04/06/23 1757    Specimen: Blood Updated: 04/06/23 1758     Glucose 183 mg/dL      Comment: Meter: [id number] : [id number] Tito ROCHA       Urinalysis, Microscopic Only - Urine, Clean Catch [[id number]] Collected: 04/06/23 1336    Specimen: Urine, Clean Catch Updated: 04/06/23 1427     RBC, UA None Seen /HPF      WBC, UA 0-2 /HPF      Bacteria, UA None Seen /HPF      Squamous Epithelial Cells, UA 0-2 /HPF      Hyaline Casts, UA None Seen /LPF      Methodology Manual Light Microscopy    CBC & Differential [[id number]]  (Abnormal) Collected: 04/06/23 1336    Specimen: Blood Updated: 04/06/23 1421    Narrative:      The following orders were created for panel order CBC & Differential.  Procedure                               Abnormality         Status                     ---------                               -----------         ------                     CBC Auto Differential[[id number]]        Abnormal            Final result                 Please view results for these tests on the individual orders.    CBC Auto Differential [[id number]]  (Abnormal) Collected: 04/06/23 1336    Specimen: Blood Updated: 04/06/23 1421     WBC 20.46 10*3/mm3      RBC 5.09 10*6/mm3      Hemoglobin 14.6 g/dL      Hematocrit 45.2 %      MCV 88.8 fL      MCH 28.7 pg      MCHC 32.3 g/dL      RDW 13.3 %      RDW-SD 43.5 fl      MPV 10.5 fL      Platelets 248 10*3/mm3      Neutrophil % 84.7 %      Lymphocyte % 6.4 %      Monocyte % 6.9 %      Eosinophil % 0.0 %      Basophil % 0.3 %      Immature Grans % 1.7 %      Neutrophils, Absolute 17.32 10*3/mm3      Lymphocytes, Absolute 1.30 10*3/mm3      Monocytes, Absolute 1.41 10*3/mm3      Eosinophils, Absolute 0.01 10*3/mm3      Basophils, Absolute 0.07 10*3/mm3      Immature Grans, Absolute 0.35 10*3/mm3      nRBC 0.0 /100 WBC     Lipase [[id number]]  (Abnormal) Collected: 04/06/23 1336    Specimen: Blood Updated: 04/06/23 1417     Lipase 69 U/L     Comprehensive Metabolic Panel  [[id number]]  (Abnormal) Collected: 04/06/23 1336    Specimen: Blood Updated: 04/06/23 1417     Glucose 239 mg/dL      BUN 32 mg/dL      Creatinine 2.54 mg/dL      Sodium 136 mmol/L      Potassium 4.7 mmol/L      Chloride 101 mmol/L      CO2 25.0 mmol/L      Calcium 9.7 mg/dL      Total Protein 6.2 g/dL      Albumin 3.8 g/dL      ALT (SGPT) 29 U/L      AST (SGOT) 25 U/L      Alkaline Phosphatase 120 U/L      Total Bilirubin 0.5 mg/dL      Globulin 2.4 gm/dL      A/G Ratio 1.6 g/dL      BUN/Creatinine Ratio 12.6     Anion Gap 10.0 mmol/L      eGFR 25.5 mL/min/1.73     Narrative:      GFR Normal >60  Chronic Kidney Disease <60  Kidney Failure <15    The GFR formula is only valid for adults with stable renal function between ages 18 and 70.    Urinalysis With Microscopic If Indicated (No Culture) - Urine, Clean Catch [[id number]]  (Abnormal) Collected: 04/06/23 1336    Specimen: Urine, Clean Catch Updated: 04/06/23 1416     Color, UA Dark Yellow     Appearance, UA Clear     pH, UA <=5.0     Specific Gravity, UA 1.020     Glucose, UA Negative     Ketones, UA Trace     Bilirubin, UA Negative     Blood, UA Negative     Protein, UA Negative     Leuk Esterase, UA Trace     Nitrite, UA Negative     Urobilinogen, UA 1.0 E.U./dL        Imaging Results (Last 24 Hours)     Procedure Component Value Units Date/Time    CT Abdomen Pelvis Without Contrast [[id number]] Collected: 04/06/23 1509     Updated: 04/06/23 1525    Narrative:      CT ABDOMEN AND PELVIS WITHOUT IV CONTRAST     HISTORY: Lower abdominal pain after irrigating ostomy. Distended abdomen  with decreased output. Atrial fibrillation on anticoagulation. Left  renal subcapsular hematoma. Colon cancer.     TECHNIQUE: Radiation dose reduction techniques were utilized, including  automated exposure control and exposure modulation based on body size.   3 mm images were obtained through the abdomen and pelvis without the  administration of IV contrast. Lack of IV contrast  limits evaluation of  solid, visceral, and vascular structures. Sensitivity for underlying  lesions and infection decreased.     COMPARISON: 10/10/2022, report 07/27/2022     FINDINGS:      LOWER CHEST: Stable cardiomegaly. Atherosclerosis including coronary  artery calcifications. Bibasilar atelectasis and/or scarring.     ABDOMEN:     Liver/Biliary Tract: Within normal limits. Cholecystectomy with stable  central biliary ductal dilation.     Spleen: Within normal limits. Splenules.     Pancreas: Fatty infiltration.     Adrenals: Stable thickening left greater than right.     Kidneys:  The previously described subcapsular hematoma about the  posterior interpolar left kidney is not as well-visualized without IV  contrast but approximates 1.9 x 5.4 cm (series 2 image 57) previously  5.6 x 8.8 cm. This collection is now predominantly low attenuation (7  Hounsfield units) with persistent but improving perinephric stranding.  Please correlate for page kidney. No new focal fluid collections. No  hydroureteronephrosis or radiopaque obstructing stones. Noncontrast  imaging of the right kidneys within normal limits.     Bowel:  Circumferential thickening of the distal esophagus similar to  the prior. The stomach is moderately distended with fluid. Mildly  prominent but not abnormally dilated fluid-filled loops of small bowel.  Left lower quadrant colostomy with Vega's pouch for colon cancer.  Recommend correlation with tumor markers and endoscopy. Mildly dilated  fluid-filled loops of proximal colon measuring up to 6.5 cm at the mid  transverse level. Scattered colonic diverticula with mild stranding  about the distal descending colon (image 94). Presacral soft tissue  thickening/nodular enhancement similar to the prior favoring granulation  tissue/scarring.     Vasculature:    Scattered calcific atherosclerosis abdominal aorta and  branch vessels.     Lymph Nodes:  Scattered subcentimeter nodes and index aortocaval node  is  6 mm in short axis, unchanged.                                 PELVIS:                                   Pelvic organs: Evaluation is limited by streak artifact from right hip  prosthesis. Prostatomegaly protruding into the bladder which is  incompletely distended. Fat-containing inguinal hernias right, greater  than left. Basilar calcifications/phleboliths..     Abdominal/Pelvic Wall: Fat-containing umbilical hernia. Postsurgical  scarring.     BONES: Status post right hip arthroplasty. Multilevel degenerative  changes thoracolumbar spine and pelvis..          Impression:      1. Status post partial colectomy with a left lower quadrant colostomy  and Vega's pouch. Mildly dilated fluid and air-filled loops of  proximal colon measuring up to 6.5 cm favoring a colonic ileus/colitis.  Recommend conservative follow-up or surgical consultation if there is  concern for developing obstruction.  2. Colonic diverticulosis with mild uncomplicated sigmoid  diverticulitis.  3. Interval decrease in size of the subcapsular hematoma along the  posterior interpolar left kidney approximating 1.9 x 5.4 cm (previously  5.6 x 8.8 cm) with persistent scarring in the left perinephric space.  4. Please see above for additional findings/recommendations.     This report was finalized on 4/6/2023 3:22 PM by Dr. Royce Granger M.D.             Current Facility-Administered Medications:   •  amiodarone (PACERONE) tablet 100 mg, 100 mg, Oral, Daily, Jeb Corrales MD  •  amLODIPine (NORVASC) tablet 5 mg, 5 mg, Oral, Daily, Jeb Corrales MD  •  apixaban (ELIQUIS) tablet 5 mg, 5 mg, Oral, BID, Jeb Corrales MD  •  atorvastatin (LIPITOR) tablet 10 mg, 10 mg, Oral, Nightly, Jeb Corrales MD  •  famotidine (PEPCID) injection 20 mg, 20 mg, Intravenous, Q12H, Jeb Corrales MD  •  insulin lispro (ADMELOG) injection 0-7 Units, 0-7 Units, Subcutaneous, TID AC, Jeb Corrales MD, 2 Units at 04/06/23 1818  •  morphine injection 2 mg, 2 mg, Intravenous,  Q4H PRN, Lalita Corrales MD  •  ondansetron (ZOFRAN) injection 4 mg, 4 mg, Intravenous, Q6H PRN, Lalita Corrales MD  •  polyethylene glycol (MIRALAX) packet 17 g, 17 g, Oral, Daily, Lalita Corrales MD  •  saccharomyces boulardii (FLORASTOR) capsule 250 mg, 250 mg, Oral, BID, Lalita Corrales MD  •  sodium chloride 0.9 % infusion, 75 mL/hr, Intravenous, Continuous, Lalita Corrales MD, Last Rate: 75 mL/hr at 04/06/23 1819, 75 mL/hr at 04/06/23 1819  •  tamsulosin (FLOMAX) 24 hr capsule 0.4 mg, 0.4 mg, Oral, Daily, Lalita Corrales MD     ASSESSMENT  Abdominal pain with abdominal distention with chronic colostomy consistent with ileus versus partial small bowel obstruction  Acute kidney injury  Leukocytosis  Chronic atrial fibrillation  Diabetes mellitus  Hypertension  Hyperlipidemia  BPH  Gastroesophageal reflux disease    PLAN  Admit  IVF  NPO  General surgery consult  Infectious disease and nephrology to follow patient  Panculture and hold onto antibiotics  Check for C. difficile toxin  Adjust home medications  Stress ulcer DVT prophylaxis  Supportive care  Patient is full code  Discussed with family and nursing staff  Follow closely further recommendation current hospital course    LALITA CORRALES MD

## 2023-04-08 VITALS
DIASTOLIC BLOOD PRESSURE: 79 MMHG | HEART RATE: 70 BPM | HEIGHT: 68 IN | WEIGHT: 237 LBS | RESPIRATION RATE: 18 BRPM | TEMPERATURE: 97.5 F | BODY MASS INDEX: 35.92 KG/M2 | OXYGEN SATURATION: 96 % | SYSTOLIC BLOOD PRESSURE: 151 MMHG

## 2023-04-08 LAB
ALBUMIN SERPL-MCNC: 2.8 G/DL (ref 3.5–5.2)
ALBUMIN/GLOB SERPL: 1.1 G/DL
ALP SERPL-CCNC: 70 U/L (ref 39–117)
ALT SERPL W P-5'-P-CCNC: 21 U/L (ref 1–41)
ANION GAP SERPL CALCULATED.3IONS-SCNC: 4.9 MMOL/L (ref 5–15)
AST SERPL-CCNC: 13 U/L (ref 1–40)
BASOPHILS # BLD AUTO: 0.04 10*3/MM3 (ref 0–0.2)
BASOPHILS NFR BLD AUTO: 0.2 % (ref 0–1.5)
BILIRUB SERPL-MCNC: 0.5 MG/DL (ref 0–1.2)
BUN SERPL-MCNC: 29 MG/DL (ref 8–23)
BUN/CREAT SERPL: 13.6 (ref 7–25)
CALCIUM SPEC-SCNC: 9.5 MG/DL (ref 8.6–10.5)
CHLORIDE SERPL-SCNC: 107 MMOL/L (ref 98–107)
CHOLEST SERPL-MCNC: 86 MG/DL (ref 0–200)
CO2 SERPL-SCNC: 26.1 MMOL/L (ref 22–29)
CREAT SERPL-MCNC: 2.13 MG/DL (ref 0.76–1.27)
DEPRECATED RDW RBC AUTO: 42.9 FL (ref 37–54)
EGFRCR SERPLBLD CKD-EPI 2021: 31.5 ML/MIN/1.73
EOSINOPHIL # BLD AUTO: 0.11 10*3/MM3 (ref 0–0.4)
EOSINOPHIL NFR BLD AUTO: 0.6 % (ref 0.3–6.2)
ERYTHROCYTE [DISTWIDTH] IN BLOOD BY AUTOMATED COUNT: 13.4 % (ref 12.3–15.4)
GLOBULIN UR ELPH-MCNC: 2.6 GM/DL
GLUCOSE BLDC GLUCOMTR-MCNC: 124 MG/DL (ref 70–130)
GLUCOSE BLDC GLUCOMTR-MCNC: 94 MG/DL (ref 70–130)
GLUCOSE SERPL-MCNC: 108 MG/DL (ref 65–99)
HBA1C MFR BLD: 6 % (ref 4.8–5.6)
HCT VFR BLD AUTO: 38.3 % (ref 37.5–51)
HDLC SERPL-MCNC: 35 MG/DL (ref 40–60)
HGB BLD-MCNC: 12.6 G/DL (ref 13–17.7)
IMM GRANULOCYTES # BLD AUTO: 0.19 10*3/MM3 (ref 0–0.05)
IMM GRANULOCYTES NFR BLD AUTO: 1 % (ref 0–0.5)
LDLC SERPL CALC-MCNC: 34 MG/DL (ref 0–100)
LDLC/HDLC SERPL: 0.97 {RATIO}
LYMPHOCYTES # BLD AUTO: 1.76 10*3/MM3 (ref 0.7–3.1)
LYMPHOCYTES NFR BLD AUTO: 9.4 % (ref 19.6–45.3)
MCH RBC QN AUTO: 28.6 PG (ref 26.6–33)
MCHC RBC AUTO-ENTMCNC: 32.9 G/DL (ref 31.5–35.7)
MCV RBC AUTO: 86.8 FL (ref 79–97)
MONOCYTES # BLD AUTO: 1.67 10*3/MM3 (ref 0.1–0.9)
MONOCYTES NFR BLD AUTO: 8.9 % (ref 5–12)
NEUTROPHILS NFR BLD AUTO: 14.99 10*3/MM3 (ref 1.7–7)
NEUTROPHILS NFR BLD AUTO: 79.9 % (ref 42.7–76)
NRBC BLD AUTO-RTO: 0 /100 WBC (ref 0–0.2)
PLATELET # BLD AUTO: 211 10*3/MM3 (ref 140–450)
PMV BLD AUTO: 10.5 FL (ref 6–12)
POTASSIUM SERPL-SCNC: 4.9 MMOL/L (ref 3.5–5.2)
PROT SERPL-MCNC: 5.4 G/DL (ref 6–8.5)
RBC # BLD AUTO: 4.41 10*6/MM3 (ref 4.14–5.8)
SODIUM SERPL-SCNC: 138 MMOL/L (ref 136–145)
TRIGL SERPL-MCNC: 85 MG/DL (ref 0–150)
TSH SERPL DL<=0.05 MIU/L-ACNC: 2.31 UIU/ML (ref 0.27–4.2)
VLDLC SERPL-MCNC: 17 MG/DL (ref 5–40)
WBC NRBC COR # BLD: 18.76 10*3/MM3 (ref 3.4–10.8)

## 2023-04-08 PROCEDURE — 80053 COMPREHEN METABOLIC PANEL: CPT | Performed by: HOSPITALIST

## 2023-04-08 PROCEDURE — 82962 GLUCOSE BLOOD TEST: CPT

## 2023-04-08 PROCEDURE — G0378 HOSPITAL OBSERVATION PER HR: HCPCS

## 2023-04-08 PROCEDURE — 85025 COMPLETE CBC W/AUTO DIFF WBC: CPT | Performed by: HOSPITALIST

## 2023-04-08 PROCEDURE — 99231 SBSQ HOSP IP/OBS SF/LOW 25: CPT | Performed by: SURGERY

## 2023-04-08 PROCEDURE — 96376 TX/PRO/DX INJ SAME DRUG ADON: CPT

## 2023-04-08 PROCEDURE — 80061 LIPID PANEL: CPT | Performed by: HOSPITALIST

## 2023-04-08 PROCEDURE — 83036 HEMOGLOBIN GLYCOSYLATED A1C: CPT | Performed by: HOSPITALIST

## 2023-04-08 PROCEDURE — 84443 ASSAY THYROID STIM HORMONE: CPT | Performed by: HOSPITALIST

## 2023-04-08 RX ORDER — FAMOTIDINE 20 MG/1
20 TABLET, FILM COATED ORAL
Status: DISCONTINUED | OUTPATIENT
Start: 2023-04-08 | End: 2023-04-08 | Stop reason: HOSPADM

## 2023-04-08 RX ORDER — AMLODIPINE BESYLATE 10 MG/1
10 TABLET ORAL DAILY
Qty: 30 TABLET | Refills: 0 | Status: SHIPPED | OUTPATIENT
Start: 2023-04-09 | End: 2023-05-09

## 2023-04-08 RX ORDER — FAMOTIDINE 20 MG/1
20 TABLET, FILM COATED ORAL
Qty: 60 TABLET | Refills: 0 | Status: SHIPPED | OUTPATIENT
Start: 2023-04-08 | End: 2023-05-08

## 2023-04-08 RX ADMIN — FAMOTIDINE 20 MG: 10 INJECTION INTRAVENOUS at 08:48

## 2023-04-08 RX ADMIN — Medication 250 MG: at 08:48

## 2023-04-08 RX ADMIN — SODIUM CHLORIDE 75 ML/HR: 9 INJECTION, SOLUTION INTRAVENOUS at 06:00

## 2023-04-08 RX ADMIN — AMLODIPINE BESYLATE 10 MG: 10 TABLET ORAL at 08:47

## 2023-04-08 RX ADMIN — AMIODARONE HYDROCHLORIDE 100 MG: 200 TABLET ORAL at 08:48

## 2023-04-08 RX ADMIN — TAMSULOSIN HYDROCHLORIDE 0.4 MG: 0.4 CAPSULE ORAL at 08:48

## 2023-04-08 RX ADMIN — APIXABAN 5 MG: 5 TABLET, FILM COATED ORAL at 08:47

## 2023-04-08 NOTE — PLAN OF CARE
Goal Outcome Evaluation:  Plan of Care Reviewed With: patient        Progress: improving  Outcome Evaluation: Patient here with acute abdominal pain. VSS. Ambulating with assistance. Voiding per brp. Colostomy to LLQ. site intact. Ostomy nurse consulted. Minimal compain of abdominal discomfort. Patient in isolation for rule out C-DIFF. Education provided on blood sugar monitoring and blood pressure monitoring. Patient verbalized understanding.

## 2023-04-08 NOTE — PROGRESS NOTES
"Daily progress note    Primary care physician      Chief complaint  Awake and alert and feeling much better with no new complaint and wants to go home.  Patient tolerating regular diet and colostomy working and denies any abdominal pain nausea vomiting.  Patient family at bedside.    History of present illness  77-year-old white male with multiple medical problems including atrial fibrillation on Eliquis hypertension BPH diabetes mellitus hyperlipidemia who also have colostomy for 27 years presented to [facility] emergency room with severe abdominal pain with abdominal distention started yesterday.  Patient denies any nausea vomiting diarrhea.  Patient work-up in ER revealed ileus versus partial small bowel obstruction and acute kidney injury admitted for management.  Patient has no fever chills chest pain shortness of breath palpitation.     REVIEW OF SYSTEMS  All systems reviewed and negative except for those discussed in HPI.      PHYSICAL EXAM         Blood pressure 151/79, pulse 70, temperature 97.5 °F (36.4 °C), temperature source Oral, resp. rate 18, height 172.7 cm (67.99\"), weight 108 kg (237 lb), SpO2 96 %.    GENERAL: Awake and alert in no distress   HENT:  Unremarkable  NECK:  Supple  CV: regular rhythm, normal rate S1-S2  RESPIRATORY: normal effort moving air bilaterally  ABDOMEN:  Soft diffuse tenderness colostomy in place bowel sounds positive  MUSCULOSKELETAL: no deformity  NEURO: alert with nonfocal neuro exam  PSYCH:  calm, cooperative  SKIN: warm, dry     LAB RESULTS  Lab Results (last 24 hours)     Procedure Component Value Units Date/Time    Blood Culture - Blood, Arm, Right [[id number]]  (Normal) Collected: 04/07/23 1157    Specimen: Blood from Arm, Right Updated: 04/08/23 1216     Blood Culture No growth at 24 hours    Blood Culture - Blood, Arm, Left [[id number]]  (Normal) Collected: 04/07/23 1157    Specimen: Blood from Arm, Left Updated: 04/08/23 1216     Blood Culture No " growth at 24 hours    POC Glucose Once [[id number]]  (Normal) Collected: 04/08/23 1109    Specimen: Blood Updated: 04/08/23 1111     Glucose 124 mg/dL      Comment: Meter: [id number] : [id number] Nona ROCHA       Comprehensive Metabolic Panel [[id number]]  (Abnormal) Collected: 04/08/23 0517    Specimen: Blood Updated: 04/08/23 0640     Glucose 108 mg/dL      BUN 29 mg/dL      Creatinine 2.13 mg/dL      Sodium 138 mmol/L      Potassium 4.9 mmol/L      Chloride 107 mmol/L      CO2 26.1 mmol/L      Calcium 9.5 mg/dL      Total Protein 5.4 g/dL      Albumin 2.8 g/dL      ALT (SGPT) 21 U/L      AST (SGOT) 13 U/L      Alkaline Phosphatase 70 U/L      Total Bilirubin 0.5 mg/dL      Globulin 2.6 gm/dL      A/G Ratio 1.1 g/dL      BUN/Creatinine Ratio 13.6     Anion Gap 4.9 mmol/L      eGFR 31.5 mL/min/1.73     Narrative:      GFR Normal >60  Chronic Kidney Disease <60  Kidney Failure <15    The GFR formula is only valid for adults with stable renal function between ages 18 and 70.    TSH [[id number]]  (Normal) Collected: 04/08/23 0517    Specimen: Blood Updated: 04/08/23 0634     TSH 2.310 uIU/mL     Lipid Panel [[id number]]  (Abnormal) Collected: 04/08/23 0517    Specimen: Blood Updated: 04/08/23 0629     Total Cholesterol 86 mg/dL      Triglycerides 85 mg/dL      HDL Cholesterol 35 mg/dL      LDL Cholesterol  34 mg/dL      VLDL Cholesterol 17 mg/dL      LDL/HDL Ratio 0.97    Narrative:      Cholesterol Reference Ranges  (U.S. Department of Health and Human Services ATP III Classifications)    Desirable          <200 mg/dL  Borderline High    200-239 mg/dL  High Risk          >240 mg/dL      Triglyceride Reference Ranges  (U.S. Department of Health and Human Services ATP III Classifications)    Normal           <150 mg/dL  Borderline High  150-199 mg/dL  High             200-499 mg/dL  Very High        >500 mg/dL    HDL Reference Ranges  (U.S. Department of Health and Human Services ATP III Classifications)    Low      <40 mg/dl (major risk factor for CHD)  High    >60 mg/dl ('negative' risk factor for CHD)        LDL Reference Ranges  (U.S. Department of Health and Human Services ATP III Classifications)    Optimal          <100 mg/dL  Near Optimal     100-129 mg/dL  Borderline High  130-159 mg/dL  High             160-189 mg/dL  Very High        >189 mg/dL    POC Glucose Once [[id number]]  (Normal) Collected: 04/08/23 0628    Specimen: Blood Updated: 04/08/23 0629     Glucose 94 mg/dL      Comment: Meter: [id number] : [id number] Jm Keiko JOSEFINA       Hemoglobin A1c [[id number]]  (Abnormal) Collected: 04/08/23 0517    Specimen: Blood Updated: 04/08/23 0623     Hemoglobin A1C 6.00 %     Narrative:      Hemoglobin A1C Ranges:    Increased Risk for Diabetes  5.7% to 6.4%  Diabetes                     >= 6.5%  Diabetic Goal                < 7.0%    CBC & Differential [[id number]]  (Abnormal) Collected: 04/08/23 0517    Specimen: Blood Updated: 04/08/23 0609    Narrative:      The following orders were created for panel order CBC & Differential.  Procedure                               Abnormality         Status                     ---------                               -----------         ------                     CBC Auto Differential[[id number]]        Abnormal            Final result                 Please view results for these tests on the individual orders.    CBC Auto Differential [[id number]]  (Abnormal) Collected: 04/08/23 0517    Specimen: Blood Updated: 04/08/23 0609     WBC 18.76 10*3/mm3      RBC 4.41 10*6/mm3      Hemoglobin 12.6 g/dL      Hematocrit 38.3 %      MCV 86.8 fL      MCH 28.6 pg      MCHC 32.9 g/dL      RDW 13.4 %      RDW-SD 42.9 fl      MPV 10.5 fL      Platelets 211 10*3/mm3      Neutrophil % 79.9 %      Lymphocyte % 9.4 %      Monocyte % 8.9 %      Eosinophil % 0.6 %      Basophil % 0.2 %      Immature Grans % 1.0 %      Neutrophils, Absolute 14.99 10*3/mm3      Lymphocytes, Absolute 1.76 10*3/mm3       Monocytes, Absolute 1.67 10*3/mm3      Eosinophils, Absolute 0.11 10*3/mm3      Basophils, Absolute 0.04 10*3/mm3      Immature Grans, Absolute 0.19 10*3/mm3      nRBC 0.0 /100 WBC     POC Glucose Once [[id number]]  (Normal) Collected: 04/07/23 2022    Specimen: Blood Updated: 04/07/23 2024     Glucose 115 mg/dL      Comment: Meter: [id number] : [id number] Jm ROCHA       Sodium, Urine, Random - Urine, Clean Catch [[id number]] Collected: 04/07/23 1834    Specimen: Urine, Clean Catch Updated: 04/07/23 1904     Sodium, Urine 83 mmol/L     Narrative:      Reference intervals for random urine have not been established.  Clinical usage is dependent upon physician's interpretation in combination with other laboratory tests.       Creatinine Urine Random (kidney function) GFR component - Urine, Clean Catch [[id number]] Collected: 04/07/23 1834    Specimen: Urine, Clean Catch Updated: 04/07/23 1904     Creatinine, Urine 78.6 mg/dL     Narrative:      Reference intervals for random urine have not been established.  Clinical usage is dependent upon physician's interpretation in combination with other laboratory tests.       Clostridioides difficile Toxin - Stool, Per Rectum [[id number]]  (Normal) Collected: 04/07/23 1651    Specimen: Stool from Per Rectum Updated: 04/07/23 1754    Narrative:      The following orders were created for panel order Clostridioides difficile Toxin - Stool, Per Rectum.  Procedure                               Abnormality         Status                     ---------                               -----------         ------                     Clostridioides difficile...[[id number]]  Normal              Final result                 Please view results for these tests on the individual orders.    Clostridioides difficile Toxin, PCR - Stool, Per Rectum [[id number]]  (Normal) Collected: 04/07/23 1651    Specimen: Stool from Per Rectum Updated: 04/07/23 1754     C. Difficile Toxins by PCR Negative     Narrative:      The result indicates the absence of toxigenic C. difficile from stool specimen.     POC Glucose Once [[id number]]  (Normal) Collected: 04/07/23 1735    Specimen: Blood Updated: 04/07/23 1737     Glucose 130 mg/dL      Comment: Meter: [id number] : [id number] Nona ROCHA       BNP [[id number]]  (Normal) Collected: 04/07/23 1433    Specimen: Blood Updated: 04/07/23 1513     proBNP 292.0 pg/mL     Narrative:      Among patients with dyspnea, NT-proBNP is highly sensitive for the detection of acute congestive heart failure. In addition NT-proBNP of <300 pg/ml effectively rules out acute congestive heart failure with 99% negative predictive value.    Results may be falsely decreased if patient taking Biotin.          Imaging Results (Last 24 Hours)     Procedure Component Value Units Date/Time    XR Chest 1 View [[id number]] Collected: 04/07/23 1534     Updated: 04/07/23 1539    Narrative:      EXAMINATION: SINGLE VIEW CHEST RADIOGRAPH     HISTORY: 77-year-old male with a history of shortness of breath.     FINDINGS: An upright AP portable chest radiograph was obtained.  Comparison is made to a chest radiograph dated 07/25/2019. The lungs are  normal in volume and are clear. The cardiac silhouette is at the upper  limits of normal in size. No evidence for a pneumothorax or pleural  effusion is appreciated.       Impression:      There is borderline cardiomegaly. No evidence for an acute  abnormality of the chest is appreciated.     This report was finalized on 4/7/2023 3:36 PM by Dr. Ronan Manley M.D.             Current Facility-Administered Medications:   •  amiodarone (PACERONE) tablet 100 mg, 100 mg, Oral, Daily, Jeb Corrales MD, 100 mg at 04/08/23 0848  •  amLODIPine (NORVASC) tablet 10 mg, 10 mg, Oral, Daily, Jeb Corrales MD, 10 mg at 04/08/23 0847  •  apixaban (ELIQUIS) tablet 5 mg, 5 mg, Oral, BID, Jeb Corrales MD, 5 mg at 04/08/23 0847  •  atorvastatin (LIPITOR) tablet 10 mg, 10 mg,  Oral, Nightly, Lalita Corrales MD, 10 mg at 04/07/23 2156  •  famotidine (PEPCID) tablet 20 mg, 20 mg, Oral, BID Savanna JORDAN Aftab, MD  •  insulin lispro (ADMELOG) injection 0-7 Units, 0-7 Units, Subcutaneous, TID Savanna JORDAN Aftab, MD, 2 Units at 04/06/23 1818  •  morphine injection 2 mg, 2 mg, Intravenous, Q4H PRN, Lalita Corrales MD  •  ondansetron (ZOFRAN) injection 4 mg, 4 mg, Intravenous, Q6H PRN, Lalita Corrales MD  •  saccharomyces boulardii (FLORASTOR) capsule 250 mg, 250 mg, Oral, BID, Lalita Corrales MD, 250 mg at 04/08/23 0848  •  sodium chloride 0.9 % infusion, 75 mL/hr, Intravenous, Continuous, Lalita Corrales MD, Last Rate: 75 mL/hr at 04/08/23 0600, 75 mL/hr at 04/08/23 0600  •  tamsulosin (FLOMAX) 24 hr capsule 0.4 mg, 0.4 mg, Oral, Daily, Lalita Corrales MD, 0.4 mg at 04/08/23 0848     ASSESSMENT  Abdominal pain with abdominal distention with chronic colostomy resolved  Ileus versus partial small bowel obstruction dilatation  Acute kidney injury resolving  Leukocytosis reactive  Chronic atrial fibrillation  Diabetes mellitus  Hypertension  Hyperlipidemia  BPH  Chronic kidney disease  Gastroesophageal reflux disease    PLAN  Discharge home if okay with all  Discharge summary dictated    LALITA CORRALES MD    Copied text in this note has been reviewed and is accurate as of 04/08/23

## 2023-04-08 NOTE — PROGRESS NOTES
Chief Complaint:    Partial colon obstruction    Subjective:    The patient is feeling well with no abdominal pain.  He is tolerating a diet and having good colostomy output    Objective:    Temp:  [97.2 °F (36.2 °C)-98.4 °F (36.9 °C)] 97.5 °F (36.4 °C)  Heart Rate:  [70-78] 70  Resp:  [17-18] 18  BP: (143-151)/(67-82) 151/79    Physical Exam  Constitutional:       Appearance: He is not ill-appearing or toxic-appearing.   Abdominal:      Palpations: Abdomen is soft.      Tenderness: There is no abdominal tenderness.   Neurological:      Mental Status: He is alert.   Psychiatric:         Behavior: Behavior is cooperative.         Results:    WBC is 18.76.  H/H is 12.6 and 30.3.    Assessment/Plan:    The patient had a partial colon obstruction that was related to a colostomy stricture that was dilated with digital exam.  He is now having good colostomy output and has no abdominal pain.  There is no need for surgical management.  I will sign off remain available if needed.    Isaias Scott Jr., M.D.   Principal Discharge DX:	Single liveborn infant, delivered vaginally  Assessment and plan of treatment:	- Follow-up with your pediatrician within 48 hours of discharge.   Routine Home Care Instructions:  - Please call us for help if you feel sad, blue or overwhelmed for more than a few days after discharge    - Umbilical cord care:        - Please keep your baby's cord clean and dry (do not apply alcohol)        - Please keep your baby's diaper below the umbilical cord until it has fallen off (~10-14 days)        - Please do not submerge your baby in a bath until the cord has fallen off (sponge bath instead)    - Continue feeding your child at least every 3 hours. Wake baby to feed if needed.     Please contact your pediatrician and return to the hospital if you notice any of the following:   - Fever  (T > 100.4)  - Reduced amount of wet diapers (< 5-6 per day) or no wet diaper in 12 hours  - Increased fussiness, irritability, or crying inconsolably  - Lethargy (excessively sleepy, difficult to arouse)  - Breathing difficulties (noisy breathing, breathing fast, using belly and neck muscles to breath)  - Changes in the baby’s color (yellow, blue, pale, gray)  - Seizure or loss of consciousness   1

## 2023-04-08 NOTE — PROGRESS NOTES
" LOS: 1 day   Patient Care Team:  Riddhi Ruiz MD as PCP - General (Family Medicine)    Chief Complaint: TEVIN/CKD      History of Present Illness  Patient is a 78 yo male with history of colostomy for about 27 years who presented to ED with abdominal pain. He was found to have partial colon obstruction which seems to be better.      He was also found to have TEVIN, with Cr up to 2.5, it is 2.4 today. Hie Cr is October 2022 was 1.8. prior to that his Cr was normal.   He states he had trauma about 6 months ago, states he fell and his kidney \"split\". He has been followed by urology. CT scan here on 10/10/22 showed a left renal subcapsular hematoma. CT yesterday show decrease size in hematoma. No further information in chart about this event.      He does reports having some vomiting last week and at times increase ostomy output     Subjective  He is feeling well this am. Denies sob or chest pain. Tolerating po    History taken from: patient chart    Objective     Vital Sign Min/Max for last 24 hours  Temp  Min: 97.2 °F (36.2 °C)  Max: 98.4 °F (36.9 °C)   BP  Min: 143/67  Max: 151/82   Pulse  Min: 67  Max: 78   Resp  Min: 17  Max: 18   SpO2  Min: 95 %  Max: 97 %   No data recorded   No data recorded     Flowsheet Rows    Flowsheet Row First Filed Value   Admission Height 172.7 cm (67.99\") Documented at 04/06/2023 1305   Admission Weight 108 kg (237 lb) Documented at 04/06/2023 1305          No intake/output data recorded.  I/O last 3 completed shifts:  In: 1380 [P.O.:1380]  Out: 50 [Stool:50]    Objective  Physical Examination: General appearance - alert, well appearing, and in no distress  Mental status - alert, oriented to person, place, and time  Eyes - pupils equal and reactive, extraocular eye movements intact  Chest - clear to auscultation, no wheezes, rales or rhonchi, symmetric air entry  Heart - normal rate, regular rhythm, normal S1, S2, no murmurs, rubs, clicks or gallops  Abdomen - soft, nontender, " nondistended, no masses or organomegaly  Neurological - alert, oriented, normal speech, no focal findings or movement disorder noted  Extremities - peripheral pulses normal, no pedal edema, no clubbing or cyanosis  Results Review:     I reviewed the patient's new clinical results.    WBC WBC   Date Value Ref Range Status   04/08/2023 18.76 (H) 3.40 - 10.80 10*3/mm3 Final   04/07/2023 23.33 (H) 3.40 - 10.80 10*3/mm3 Final   04/06/2023 20.46 (H) 3.40 - 10.80 10*3/mm3 Final      HGB Hemoglobin   Date Value Ref Range Status   04/08/2023 12.6 (L) 13.0 - 17.7 g/dL Final   04/07/2023 14.0 13.0 - 17.7 g/dL Final   04/06/2023 14.6 13.0 - 17.7 g/dL Final      HCT Hematocrit   Date Value Ref Range Status   04/08/2023 38.3 37.5 - 51.0 % Final   04/07/2023 41.3 37.5 - 51.0 % Final   04/06/2023 45.2 37.5 - 51.0 % Final      Platlets No results found for: LABPLAT   MCV MCV   Date Value Ref Range Status   04/08/2023 86.8 79.0 - 97.0 fL Final   04/07/2023 84.3 79.0 - 97.0 fL Final   04/06/2023 88.8 79.0 - 97.0 fL Final          Sodium Sodium   Date Value Ref Range Status   04/08/2023 138 136 - 145 mmol/L Final   04/07/2023 138 136 - 145 mmol/L Final   04/06/2023 136 136 - 145 mmol/L Final      Potassium Potassium   Date Value Ref Range Status   04/08/2023 4.9 3.5 - 5.2 mmol/L Final   04/07/2023 4.9 3.5 - 5.2 mmol/L Final   04/06/2023 4.7 3.5 - 5.2 mmol/L Final      Chloride Chloride   Date Value Ref Range Status   04/08/2023 107 98 - 107 mmol/L Final   04/07/2023 107 98 - 107 mmol/L Final   04/06/2023 101 98 - 107 mmol/L Final      CO2 CO2   Date Value Ref Range Status   04/08/2023 26.1 22.0 - 29.0 mmol/L Final   04/07/2023 21.0 (L) 22.0 - 29.0 mmol/L Final   04/06/2023 25.0 22.0 - 29.0 mmol/L Final      BUN BUN   Date Value Ref Range Status   04/08/2023 29 (H) 8 - 23 mg/dL Final   04/07/2023 39 (H) 8 - 23 mg/dL Final   04/06/2023 32 (H) 8 - 23 mg/dL Final      Creatinine Creatinine   Date Value Ref Range Status   04/08/2023 2.13  (H) 0.76 - 1.27 mg/dL Final   04/07/2023 2.43 (H) 0.76 - 1.27 mg/dL Final   04/06/2023 2.54 (H) 0.76 - 1.27 mg/dL Final      Calcium Calcium   Date Value Ref Range Status   04/08/2023 9.5 8.6 - 10.5 mg/dL Final   04/07/2023 9.4 8.6 - 10.5 mg/dL Final   04/06/2023 9.7 8.6 - 10.5 mg/dL Final      PO4 No results found for: CAPO4   Albumin Albumin   Date Value Ref Range Status   04/08/2023 2.8 (L) 3.5 - 5.2 g/dL Final   04/06/2023 3.8 3.5 - 5.2 g/dL Final      Magnesium No results found for: MG   Uric Acid No results found for: URICACID     Medication Review:     Current Facility-Administered Medications:   •  amiodarone (PACERONE) tablet 100 mg, 100 mg, Oral, Daily, Jeb Corrales MD, 100 mg at 04/07/23 1502  •  amLODIPine (NORVASC) tablet 10 mg, 10 mg, Oral, Daily, Jeb Corrales MD  •  apixaban (ELIQUIS) tablet 5 mg, 5 mg, Oral, BID, Jeb Corrales MD, 5 mg at 04/07/23 2156  •  atorvastatin (LIPITOR) tablet 10 mg, 10 mg, Oral, Nightly, Jeb Corrales MD, 10 mg at 04/07/23 2156  •  famotidine (PEPCID) injection 20 mg, 20 mg, Intravenous, Q12H, Jeb Corrales MD, 20 mg at 04/07/23 2156  •  insulin lispro (ADMELOG) injection 0-7 Units, 0-7 Units, Subcutaneous, TID AC, Jeb Corrales MD, 2 Units at 04/06/23 1818  •  morphine injection 2 mg, 2 mg, Intravenous, Q4H PRN, Jeb Corrales MD  •  ondansetron (ZOFRAN) injection 4 mg, 4 mg, Intravenous, Q6H PRN, Jeb Corrales MD  •  saccharomyces boulardii (FLORASTOR) capsule 250 mg, 250 mg, Oral, BID, Jeb Corrales MD, 250 mg at 04/07/23 2156  •  sodium chloride 0.9 % infusion, 75 mL/hr, Intravenous, Continuous, Jeb Corrales MD, Last Rate: 75 mL/hr at 04/08/23 0600, 75 mL/hr at 04/08/23 0600  •  tamsulosin (FLOMAX) 24 hr capsule 0.4 mg, 0.4 mg, Oral, Daily, Jeb Corrales MD, 0.4 mg at 04/07/23 1502      Assessment & Plan       Acute abdominal pain      Assessment & Plan  1. TEVIN vs CKD  2. Left renal subcapsular hematoma- improving   3. Bowel obstruction   4. Metabolic acidosis       TEVIN could be due to volume depletion with recent vomiting, bowel obstruction.   However, I am also wondering if this kidney trauma might have caused him some permanent loss is some renal function.   UA is bland.   Urine studies not consistent with prerenal.   Renal function improved. Baseline not very clear at this time   Continue IVF today.   Will need follow up with us after DC  Will follow     Giselle Bradley MD  04/08/23  08:12 EDT

## 2023-04-08 NOTE — PLAN OF CARE
Goal Outcome Evaluation:Pt admitted yesterday with c/o acute abdominal pain, VSS, afebrile, No c/o pain this shift, having brown liquid stool from ostomy this shift, and leaking around appliance. Consult ordered for ostomy nurse to see.Stool sent for Cdiff r/o which was negative, urine sent for random creatinine and sodium. Will cont to monitor.

## 2023-04-08 NOTE — DISCHARGE SUMMARY
Discharge summary    Date of admission 4/6/2023  Date of discharge 4/8/2023    Final diagnosis  Abdominal pain with abdominal distention with chronic colostomy resolved  Ileus versus partial small bowel obstruction  s/p digital dilatation  Acute kidney injury resolving  Leukocytosis reactive  Chronic atrial fibrillation  Diabetes mellitus  Hypertension  Hyperlipidemia  BPH  Chronic kidney disease  Gastroesophageal reflux disease    Discharge medications    Current Facility-Administered Medications:   •  amiodarone (PACERONE) tablet 100 mg, 100 mg, Oral, Daily, Jeb Corrales MD, 100 mg at 04/08/23 0848  •  amLODIPine (NORVASC) tablet 10 mg, 10 mg, Oral, Daily, Jeb Corrales MD, 10 mg at 04/08/23 0847  •  apixaban (ELIQUIS) tablet 5 mg, 5 mg, Oral, BID, Jeb Corrales MD, 5 mg at 04/08/23 0847  •  atorvastatin (LIPITOR) tablet 10 mg, 10 mg, Oral, Nightly, Jeb Corrales MD, 10 mg at 04/07/23 2156  •  famotidine (PEPCID) tablet 20 mg, 20 mg, Oral, BID Savanna JORDAN Aftab, MD  •  insulin lispro (ADMELOG) injection 0-7 Units, 0-7 Units, Subcutaneous, TID Savanna JORDAN Aftab, MD, 2 Units at 04/06/23 1818  •  saccharomyces boulardii (FLORASTOR) capsule 250 mg, 250 mg, Oral, BID, Jeb Corrales MD, 250 mg at 04/08/23 0848  •  tamsulosin (FLOMAX) 24 hr capsule 0.4 mg, 0.4 mg, Oral, Daily, Jeb Corrales MD, 0.4 mg at 04/08/23 0848     Consults obtained  General surgery  Nephrology  Infectious disease    Procedures  Digital dilatation    Hospital course  77-year white male with multiple medical problem who has had colostomy for 27 years admitted to emergency room with abdominal pain with distention and work-up in ER revealed ileus versus partial small bowel obstruction and admitted for management.  Patient admitted further eval by general surgery and perform digital dilatation secondary to colostomy stricture and his symptoms resolved.  Patient also found to be in acute kidney injury with severe leukocytosis and further evaluated by  nephrology and infectious disease.  Patient has no signs and symptoms of any infection and infections recommend no further work-up clear for discharge although his white count still very high but came down from yesterday.  Patient kidney function also improved and now his BUN is 29 creatinine 2.1 and nephrology also recommended he is okay to go and they will follow as an outpatient.  Patient is tolerating regular diet and has no complaint whatsoever.  Patient advised not to take naproxen Glucotrol metformin Zestril Lasix potassium and hydrochlorothiazide.    Discharge diet regular    Activity as tolerated    Medication as above    Follow-up with prime doctor in 1 week and follow-up with general surgery nephrology and infectious disease per the instructions and take medication as directed.    LALITA MARIANO MD

## 2023-04-08 NOTE — CONSULTS
CONSULT NOTE    Infectious Diseases - Silvina Chirinos MD  [facility]       Patient Identification:  Name: Luis Diaz  Age: 77 y.o.  Sex: male  :  1945  MRN: [id number]             Date of Consultation: 2023      Primary Care Physician: Riddhi Ruiz MD                               Requesting Physician: Dr. Corrales  Reason for Consultation: Sepsis    Impression: Patient is a 77-year-old male with complicated past medical history including history of colectomy and colostomy since  and has routine of irrigating his ostomy on a daily basis was in his usual state of his health until about a week ago when after eating a meal he developed significant nausea and vomiting with eventual recovery and almost back to his baseline until this 2023 when he did his routine irrigation of his ostomy and this time he did not get anything back.  Because of that he came to the emergency room and has associated abdominal distention and discomfort.  Work-up revealed ileus and acute kidney injury and leukocytosis.  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  In the next 24 hours patient started to feel better and yesterday on 2023 had a spontaneous activity in his ostomy with significant relief of abdominal discomfort and improvement in sense of wellbeing and return of appetite.  Patient has not been started on any specific antibiotic therapy and so far septic work-up including blood cultures and C. difficile toxin assays have been negative.  Patient is much improved with expected improvement in his white blood cell count pattern.  This presentation of leukocytosis in the above setting is consistent with:  1-demargination/reactive leukocytosis due to stress of ileus/bowel obstruction now improving trend with clinical improvement and recovery  of bowel function and expected to continue to improve with observation alone.  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  4-chronic atrial fibrillation  5-type 2 diabetes  6-other diagnoses per primary team.    Recommendations/Discussions:  · At this juncture I agree with your care plan consisting of supportive care and observing him off of antibiotic therapy.  · Patient seems to have improved clinically and is doing very well with return of appetite and bowel function.  He denies any localizing symptoms suggestive of infectious process anywhere else or involving any other organ system.  Given his clinical progress and symptom pattern and trend of his white blood cell count it appears that his leukocytosis was due to stress induced by the  bowel obstruction and fluid shift.  · From infectious disease standpoint patient does not require any further work-up or antibiotic treatment and require continued fluid and electrolyte management and management of his underlying renal insufficiency and monitoring.  · Patient will require periodic assessment to identify any subclinical or evolving infectious process which is not present at this time are apparent at this time.  · From infectious disease standpoint patient can be discharged on no antibiotic therapy if considered stable from surgery and internal medicine standpoint.  · Thank you Dr. Granger for letting me be the part of your patient care please see above impression and recommendation      History of Present Illness:   Patient is a 77-year-old male with complicated past medical history including history of colectomy and colostomy since 1996 and has routine of irrigating his ostomy on a daily basis was in his usual state of his health until about a week ago when after eating a meal he developed significant  nausea and vomiting with eventual recovery and almost back to his baseline until this 4/6/2023 when he did his routine irrigation of his ostomy and this time he did not get anything back.  Because of that he came to the emergency room and has associated abdominal distention and discomfort.  Work-up revealed ileus and acute kidney injury and leukocytosis.  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  In the next 24 hours patient started to feel better and yesterday on 4/7/2023 had a spontaneous activity in his ostomy with significant relief of abdominal discomfort and improvement in sense of wellbeing and return of appetite.  Patient has not been started on any specific antibiotic therapy and so far septic work-up including blood cultures and C. difficile toxin assays have been negative.  Patient is much improved with expected improvement in his white blood cell count pattern.      Past Medical History:  Past Medical History:   Diagnosis Date   • Arthritis    • At risk for transmitting infection to others 04/19/2016   • Atrial fibrillation    • B12 deficiency    • Bone spur    • BPH (benign prostatic hyperplasia)     FOLLOWED BY DR. SALAS OGLESBY   • Bruises easily    • Cellulitis of left lower extremity 03/10/2020   • Closed displaced comminuted fracture of right patella 10/01/2019    SEEN AT [facility]   • Colon cancer 05/19/1996    Operation   • Colostomy and enterostomy malfunction 03/10/2020   • Colostomy in place     LEFT   • Coronary artery disease 03/2022    AFiB   • Diabetes mellitus     TYPE 2, NIDDM   • Fatigue    • GERD (gastroesophageal reflux disease)    • Hyperlipidemia    • Hypertension    • Ileus 10/03/2014    ADMITTED TO [facility]   • Influenza 01/01/2013   • Influenza A 01/03/2014   • Left leg swelling 02/2020   • OA (osteoarthritis)    • Onychomycosis 05/2016    • Osteopenia    • Polio 1951   • Prostatitis    • PSA elevation 10/2014    FOLLOWED BY DR. 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 NO CHANGES   • Rectal cancer 1996    S/P APR WITH colOSTOMY   • Seasonal allergies    • Shingles 04/26/2016    SEEN AT [facility] ER   • Sleep apnea     CPAP   • Thrombocytopenia    • Vitamin D deficiency      Past Surgical History:  Past Surgical History:   Procedure Laterality Date   • CARDIOVERSION      6/2022, 5/2022   • CHOLECYSTECTOMY N/A 08/09/2012    LAPAROSCOPIC, DR. JACQUELINE PELAEZ AT [facility]   • COLON RESECTION N/A 04/1996    APR WITH COLOSTOMY, DR.DAVID TAYLOR   • COLONOSCOPY N/A     DR. LAI RAMIREZ   • COLONOSCOPY N/A 11/21/2022    Procedure: COLONOSCOPY THROUGH OSTOMY TO CECUM WITH COLD BX POLYPECTOMY;  Surgeon: Jacqueline Pelaez MD;  Location: [facility] ENDOSCOPY;  Service: General;  Laterality: N/A;  HX RECTAL CA  --CECAL MUCOSAL ABNORMALITY, POLYP    • ENDOSCOPY N/A 12/18/2018    NO ENDOSCOPIC ABNORMALITY WAS EVIDENT, DILATION OF ESOPHAGUS WITH LITTLEJOHN DILATOR TO 48FR AND 52FR, DR. ADELIA TOLEDO AT [facility]   • FOOT SURGERY Left 07/1958    RUBIO PROCEDURE FOR TENDON REPAIR, GREAT TOE   • INCISION AND DRAINAGE PERIRECTAL ABSCESS N/A 06/04/2020    Procedure: excision/debridement of perineal sinus cavity with drain placement;  Surgeon: Tianna Sagastume MD;  Location: [facility] OR;  Service: General;  Laterality: N/A;   • KNEE ARTHROPLASTY Left     2013   • KNEE CARTILAGE SURGERY Left    • ORIF FINGER / THUMB FRACTURE Left 1985   • PATELLA TENDON REPAIR Right 10/10/2019    Procedure: PATELLA TENDON REPAIR;  Surgeon: Luis Roman MD;  Location: [facility] OR;  Service: Orthopedics   • SIGMOIDOSCOPY N/A 12/18/2018    NO CASTILLO'S POUCH WAS PRESENT, A SMALL SINUS TRACT WITH MATERIAL SUTURE AS A WICK WAS PRESENT, DR. ADELIA TOLEDO AT [facility]   • TONSILLECTOMY AND  ADENOIDECTOMY Bilateral 1950    AT [facility]   • TOTAL HIP ARTHROPLASTY Right 03/06/2014    DR. LUIS ROMAN AT [facility]   • TOTAL KNEE ARTHROPLASTY Right 08/08/2019    Procedure: RIGHT TOTAL KNEE ARTHROPLASTY;  Surgeon: Luis Roman MD;  Location: [facility] OR;  Service: Orthopedics      Home Meds:  Medications Prior to Admission   Medication Sig Dispense Refill Last Dose   • amiodarone (PACERONE) 200 MG tablet Take 100 mg by mouth Daily.   4/6/2023   • amLODIPine (NORVASC) 5 MG tablet Take 1 tablet by mouth Daily. Unsure of dose   4/6/2023   • apixaban (ELIQUIS) 5 MG tablet tablet Take 1 tablet by mouth 2 (Two) Times a Day. HOLDING AS OF SAT 11/19/2022 FOR ENDO 60 tablet  4/6/2023   • atorvastatin (LIPITOR) 10 MG tablet Take 1 tablet by mouth Every Night.   4/5/2023   • coenzyme Q10 100 MG capsule Take 1 capsule by mouth Daily.   4/5/2023   • glipizide (GLUCOTROL) 10 MG tablet Take 1 tablet by mouth Daily.   4/6/2023   • hydroCHLOROthiazide (HYDRODIURIL) 25 MG tablet Take 1 tablet by mouth Daily.   4/6/2023   • JANUVIA 100 MG tablet Take 1 tablet by mouth Daily.   4/6/2023   • lisinopril (PRINIVIL,ZESTRIL) 20 MG tablet Take 1 tablet by mouth 2 (Two) Times a Day.   4/6/2023   • metFORMIN ER (GLUCOPHAGE-XR) 750 MG 24 hr tablet Take 1 tablet by mouth Daily With Breakfast.   4/6/2023   • Multiple Vitamins-Minerals (CENTRUM SILVER 50+MEN PO) Take  by mouth.   4/5/2023   • naproxen sodium (ALEVE) 220 MG tablet Take 1 tablet by mouth 2 (Two) Times a Day As Needed for Mild Pain.   4/6/2023   • Omega-3 Fatty Acids (OMEGA 3 PO) Take 2 capsules by mouth Daily.   4/6/2023   • tamsulosin (FLOMAX) 0.4 MG capsule 24 hr capsule Take 1 capsule by mouth Daily.   4/6/2023   • Vitamin D, Ergocalciferol, 2000 units capsule Take 1 tablet by mouth Daily.   4/5/2023   • polyethylene glycol (MIRALAX) packet Take 17 g by mouth Daily. (Patient taking differently: Take 17 g by mouth Daily As Needed.)   More than a month   •  saccharomyces boulardii (Florastor) 250 MG capsule Take 1 capsule by mouth 2 (Two) Times a Day. (Patient taking differently: Take 1 capsule by mouth Daily As Needed.) 60 capsule 2 Unknown     Current Meds:     Current Facility-Administered Medications:   •  amiodarone (PACERONE) tablet 100 mg, 100 mg, Oral, Daily, Jeb Corrales MD, 100 mg at 04/07/23 1502  •  amLODIPine (NORVASC) tablet 10 mg, 10 mg, Oral, Daily, Jeb Corrales MD  •  apixaban (ELIQUIS) tablet 5 mg, 5 mg, Oral, BID, Jeb Corrales MD, 5 mg at 04/07/23 2156  •  atorvastatin (LIPITOR) tablet 10 mg, 10 mg, Oral, Nightly, Jeb Corrales MD, 10 mg at 04/07/23 2156  •  famotidine (PEPCID) injection 20 mg, 20 mg, Intravenous, Q12H, Jeb Corrales MD, 20 mg at 04/07/23 2156  •  insulin lispro (ADMELOG) injection 0-7 Units, 0-7 Units, Subcutaneous, TID AC, Jeb Corrales MD, 2 Units at 04/06/23 1818  •  morphine injection 2 mg, 2 mg, Intravenous, Q4H PRN, Jeb Corrales MD  •  ondansetron (ZOFRAN) injection 4 mg, 4 mg, Intravenous, Q6H PRN, Jeb Corrales MD  •  saccharomyces boulardii (FLORASTOR) capsule 250 mg, 250 mg, Oral, BID, Jeb Corrales MD, 250 mg at 04/07/23 2156  •  sodium chloride 0.9 % infusion, 75 mL/hr, Intravenous, Continuous, Jeb Corrales MD, Last Rate: 75 mL/hr at 04/08/23 0600, 75 mL/hr at 04/08/23 0600  •  tamsulosin (FLOMAX) 24 hr capsule 0.4 mg, 0.4 mg, Oral, Daily, Jeb Corrales MD, 0.4 mg at 04/07/23 1502  Allergies:  Allergies   Allergen Reactions   • Toradol [Ketorolac Tromethamine] Dizziness     SYNCOPE, TUNNEL VISION     Social History:   Social History     Tobacco Use   • Smoking status: Never   • Smokeless tobacco: Never   Substance Use Topics   • Alcohol use: No      Family History:  Family History   Problem Relation Age of Onset   • Emphysema Mother    • Heart disease Mother    • Diabetes Mother    • Heart failure Mother    • Heart attack Mother    • Arthritis Mother    • Osteoarthritis Mother    • Cancer Mother    • Lung cancer  "Father    • Drug abuse Father    • Hypertension Father    • Alcohol abuse Father    • Prostate cancer Brother    • Colon polyps Brother    • Asthma Brother    • COPD Brother    • Cancer Brother    • Diabetes Maternal Grandfather    • Heart disease Maternal Grandfather    • Diabetes Paternal Grandmother    • No Known Problems Daughter    • No Known Problems Daughter    • Malig Hyperthermia Neg Hx           Review of Systems  See history of present illness and past medical history.    Overall feeling much better.  Denies any fever chills abdominal pain nausea vomiting diarrhea or decrease in appetite.    Vitals:   /67 (BP Location: Right arm, Patient Position: Lying)   Pulse 78   Temp 97.4 °F (36.3 °C) (Oral)   Resp 17   Ht 172.7 cm (67.99\")   Wt 108 kg (237 lb)   SpO2 97%   BMI 36.04 kg/m²   I/O:     Intake/Output Summary (Last 24 hours) at 4/8/2023 0740  Last data filed at 4/7/2023 2219  Gross per 24 hour   Intake 1260 ml   Output 50 ml   Net 1210 ml     Exam:  Patient is examined using the personal protective equipment as per guidelines from infection control for this particular patient as enacted.  Hand washing was performed before and after patient interaction.  General Appearance:   Alert cooperative pleasant well-appearing male.   Head:    Normocephalic, without obvious abnormality, atraumatic   Eyes:    PERRL, conjunctivae/corneas clear, EOM's intact, both eyes   Ears:    Normal external ear canals, both ears   Nose:   Nares normal, septum midline, mucosa normal, no drainage    or sinus tenderness   Throat:   Lips, tongue, gums normal; oral mucosa pink and moist   Neck:   Supple, symmetrical, trachea midline, no adenopathy;     thyroid:  no enlargement/tenderness/nodules; no carotid    bruit or JVD   Back:     Symmetric, no curvature, ROM normal, no CVA tenderness   Lungs:     Clear to auscultation bilaterally, respirations unlabored   Chest Wall:    No tenderness or deformity    Heart:   S1-S2 " regular   Abdomen:    Ostomy functional with no abdominal tenderness or discomfort or guarding rigidity or rebound noted   Extremities:   Extremities normal, atraumatic, no cyanosis or edema   Pulses:   Pulses palpable in all extremities; symmetric all extremities   Skin:   Skin color normal, Skin is warm and dry,  no rashes or palpable lesions   Neurologic:  Grossly nonfocal       Data Review:    I reviewed the patient's new clinical results.  Results from last 7 days   Lab Units 04/08/23 0517 04/07/23 0457 04/06/23  1336   WBC 10*3/mm3 18.76* 23.33* 20.46*   HEMOGLOBIN g/dL 12.6* 14.0 14.6   PLATELETS 10*3/mm3 211 262 248     Results from last 7 days   Lab Units 04/08/23 0517 04/07/23 0457 04/06/23  1336   SODIUM mmol/L 138 138 136   POTASSIUM mmol/L 4.9 4.9 4.7   CHLORIDE mmol/L 107 107 101   CO2 mmol/L 26.1 21.0* 25.0   BUN mg/dL 29* 39* 32*   CREATININE mg/dL 2.13* 2.43* 2.54*   CALCIUM mg/dL 9.5 9.4 9.7   GLUCOSE mg/dL 108* 183* 239*     No results found for: CULTURE]    Assessment:  Active Hospital Problems    Diagnosis  POA   • **Acute abdominal pain [R10.9]  Yes      Resolved Hospital Problems   No resolved problems to display.         Plan:  As above  Silvina Lopez MD   4/8/2023  07:40 EDT    Parts of this note may be an electronic transcription/translation of spoken language to printed text using the Dragon dictation system.

## 2023-04-09 ENCOUNTER — READMISSION MANAGEMENT (OUTPATIENT)
Dept: CALL CENTER | Facility: HOSPITAL | Age: 78
End: 2023-04-09
Payer: MEDICARE

## 2023-04-09 NOTE — OUTREACH NOTE
Prep Survey    Flowsheet Row Responses   [facility] [place] operated by [facility] facility patient discharged from? [place]   Is LACE score < 7 ? No   Eligibility Readm Mgmt   Discharge diagnosis  ileus versus partial small bowel obstruction   Does the patient have one of the following disease processes/diagnoses(primary or secondary)? Other   Does the patient have Home health ordered? No   Is there a DME ordered? No   Prep survey completed? Yes          Maricruz SUTTON - Registered Nurse

## 2023-04-10 NOTE — CASE MANAGEMENT/SOCIAL WORK
Case Management Discharge Note      Final Note: dc'd home via private auto.         Selected Continued Care - Discharged on 4/8/2023 Admission date: 4/6/2023 - Discharge disposition: Home or Self Care    Destination    No services have been selected for the patient.              Durable Medical Equipment    No services have been selected for the patient.              Dialysis/Infusion    No services have been selected for the patient.              Home Medical Care    No services have been selected for the patient.              Therapy    No services have been selected for the patient.              Community Resources    No services have been selected for the patient.              Community & DME    No services have been selected for the patient.                  Transportation Services  Private: Car    Final Discharge Disposition Code: 01 - home or self-care

## 2023-04-12 ENCOUNTER — READMISSION MANAGEMENT (OUTPATIENT)
Dept: CALL CENTER | Facility: HOSPITAL | Age: 78
End: 2023-04-12
Payer: MEDICARE

## 2023-04-12 LAB
BACTERIA SPEC AEROBE CULT: NORMAL
BACTERIA SPEC AEROBE CULT: NORMAL

## 2023-04-12 NOTE — OUTREACH NOTE
Medical Week 1 Survey    Flowsheet Row Responses   [facility] patient discharged from? [place]   Does the patient have one of the following disease processes/diagnoses(primary or secondary)? Other   Week 1 attempt successful? Yes   Call start time 0918   Call end time 0919   Discharge diagnosis  ileus versus partial small bowel obstruction   Person spoke with today (if not patient) and relationship patient   Meds reviewed with patient/caregiver? Yes   Does the patient have all medications ordered at discharge? No   Nursing Interventions No intervention needed   Prescription comments Patient was DC and had to catch a plane to [place] when he comes back into town he will  pepcid.   Does the patient have a primary care provider?  Yes   Comments regarding PCP Has an appt with pcp next week.   Has home health visited the patient within 72 hours of discharge? N/A   Psychosocial issues? No   Did the patient receive a copy of their discharge instructions? Yes   Nursing interventions Reviewed instructions with patient   What is the patient's perception of their health status since discharge? Improving   Is the patient/caregiver able to teach back signs and symptoms related to disease process for when to call PCP? Yes   Is the patient/caregiver able to teach back signs and symptoms related to disease process for when to call 911? Yes   Is the patient/caregiver able to teach back the hierarchy of who to call/visit for symptoms/problems? PCP, Specialist, Home health nurse, Urgent Care, ED, 911 Yes   Week 1 call completed? Yes   Graduated Yes   Is the patient interested in additional calls from an ambulatory ?  NOTE:  applies to high risk patients requiring additional follow-up. No   Did the patient feel the follow up calls were helpful during their recovery period? Yes   Was the number of calls appropriate? Yes   Wrap up additional comments Patient states he is doing well. he will see pcp next week.  He left hospital and flew out to [place] no other concerns or questions noted.          Letty SANDERS - Registered Nurse

## (undated) DEVICE — DRSNG PAD ABD 8X10IN STRL

## (undated) DEVICE — NDL SPINE 20G 3 1/2 YEL STRL 1P/U

## (undated) DEVICE — DRSNG GZ PETROLTM XEROFORM CURAD 1X8IN STRL

## (undated) DEVICE — NDL HYPO PRECISIONGLIDE REG 25G 1 1/2

## (undated) DEVICE — DUAL CUT SAGITTAL BLADE

## (undated) DEVICE — SENSR O2 OXIMAX FNGR A/ 18IN NONSTR

## (undated) DEVICE — STERILE LATEX POWDER-FREE SURGICAL GLOVESWITH NITRILE COATING: Brand: PROTEXIS

## (undated) DEVICE — SUT SILK 2/0 TIES 18IN A185H

## (undated) DEVICE — SPNG GZ WOVN 4X4IN 12PLY 10/BX STRL

## (undated) DEVICE — SINGLE-USE BIOPSY FORCEPS: Brand: RADIAL JAW 4

## (undated) DEVICE — HANDPIECE SET WITH COAXIAL HIGH FLOW TIP AND SUCTION TUBE: Brand: INTERPULSE

## (undated) DEVICE — PK KN TOTL 40

## (undated) DEVICE — GLV SURG SENSICARE PI PF LF 7 GRN STRL

## (undated) DEVICE — TRAP FLD MINIVAC MEGADYNE 100ML

## (undated) DEVICE — PIN TROC SIGNATURE PK/2

## (undated) DEVICE — GLV SURG BIOGEL LTX PF 7 1/2

## (undated) DEVICE — PREP SOL POVIDONE/IODINE BT 4OZ

## (undated) DEVICE — SPNG LAP 18X18IN LF STRL PK/5

## (undated) DEVICE — STPLR SKIN VISISTAT WD 35CT

## (undated) DEVICE — PANTY KNIT WASHABLE LG/XL BRN/GRN LF

## (undated) DEVICE — BNDG ELAS ELITE V/CLOSE 6IN 5YD LF STRL

## (undated) DEVICE — UNDERCAST PADDING: Brand: DEROYAL

## (undated) DEVICE — TUBING, SUCTION, 1/4" X 10', STRAIGHT: Brand: MEDLINE

## (undated) DEVICE — KT ORCA ORCAPOD DISP STRL

## (undated) DEVICE — LOU MINOR PROCEDURE: Brand: MEDLINE INDUSTRIES, INC.

## (undated) DEVICE — SYS PERFUS SEP PLATLT W TIPS CUST

## (undated) DEVICE — APPL CHLORAPREP W/TINT 26ML ORNG

## (undated) DEVICE — BNDG ELAS ELITE V/CLOSE 4IN 5YD LF STRL

## (undated) DEVICE — RETR RNG GEN2 14.1CMX14.1CM

## (undated) DEVICE — LN SMPL CO2 SHTRM SD STREAM W/M LUER

## (undated) DEVICE — SUTURE RETRIEVER

## (undated) DEVICE — DRAPE,REIN 53X77,STERILE: Brand: MEDLINE

## (undated) DEVICE — GOWN SURG AERO CHROME XL

## (undated) DEVICE — CANN NASL CO2 TRULINK W/O2 A/

## (undated) DEVICE — PK ORTHO MAJ 40

## (undated) DEVICE — PAD,ABDOMINAL,8"X10",ST,LF: Brand: MEDLINE

## (undated) DEVICE — ADAPT CLN BIOGUARD AIR/H2O DISP

## (undated) DEVICE — THE TORRENT IRRIGATION SCOPE CONNECTOR IS USED WITH THE TORRENT IRRIGATION TUBING TO PROVIDE IRRIGATION FLUIDS SUCH AS STERILE WATER DURING GASTROINTESTINAL ENDOSCOPIC PROCEDURES WHEN USED IN CONJUNCTION WITH AN IRRIGATION PUMP (OR ELECTROSURGICAL UNIT).: Brand: TORRENT

## (undated) DEVICE — ANTIBACTERIAL UNDYED BRAIDED (POLYGLACTIN 910), SYNTHETIC ABSORBABLE SUTURE: Brand: COATED VICRYL

## (undated) DEVICE — GLV SURG SENSICARE PI LF PF 7.5 GRN STRL

## (undated) DEVICE — Device: Brand: DEFENDO AIR/WATER/SUCTION AND BIOPSY VALVE

## (undated) DEVICE — PENCL E/S ULTRAVAC TELESCP NOSE HOLSTR 10FT

## (undated) DEVICE — DRAPE,UTILITY,TAPE,15X26,STERILE: Brand: MEDLINE

## (undated) DEVICE — Device

## (undated) DEVICE — SUT ETHIB 0 CT1 CR8 18IN CX21D

## (undated) DEVICE — GLV SURG PREMIERPRO ORTHO LTX PF SZ7.5 BRN

## (undated) DEVICE — DISPOSABLE TOURNIQUET CUFF SINGLE BLADDER, SINGLE PORT AND QUICK CONNECT CONNECTOR: Brand: COLOR CUFF

## (undated) DEVICE — BITEBLOCK OMNI BLOC

## (undated) DEVICE — PENCL E/S HNDSWCH ROCKR CB

## (undated) DEVICE — DECANT BG O JET

## (undated) DEVICE — ZIPPERED TOGA, 2X LARGE: Brand: FLYTE, SURGICOOL

## (undated) DEVICE — CANN O2 ETCO2 FITS ALL CONN CO2 SMPL A/ 7IN DISP LF

## (undated) DEVICE — SUT VICRYL 1 CT1 27IN  JJ40G

## (undated) DEVICE — T-DRAPE,EXTREMITY,STERILE: Brand: MEDLINE

## (undated) DEVICE — KT DRN EVAC WND PVC PCH WTROC RND 10F400

## (undated) DEVICE — RETR STAY HK ELAS SHRP 5MM PK/8

## (undated) DEVICE — GLV SURG BIOGEL LTX PF 7

## (undated) DEVICE — DRAPE,UNDERBUTTOCKS,PCH,STERILE: Brand: MEDLINE

## (undated) DEVICE — STRAP STIRUP WO/ RNG